# Patient Record
Sex: FEMALE | Race: WHITE | NOT HISPANIC OR LATINO | Employment: OTHER | URBAN - METROPOLITAN AREA
[De-identification: names, ages, dates, MRNs, and addresses within clinical notes are randomized per-mention and may not be internally consistent; named-entity substitution may affect disease eponyms.]

---

## 2019-02-11 ENCOUNTER — APPOINTMENT (OUTPATIENT)
Dept: GENERAL RADIOLOGY | Facility: HOSPITAL | Age: 84
End: 2019-02-11

## 2019-02-11 ENCOUNTER — HOSPITAL ENCOUNTER (EMERGENCY)
Facility: HOSPITAL | Age: 84
Discharge: HOME | End: 2019-02-11
Attending: EMERGENCY MEDICINE | Admitting: EMERGENCY MEDICINE

## 2019-02-11 VITALS
DIASTOLIC BLOOD PRESSURE: 91 MMHG | TEMPERATURE: 98 F | HEIGHT: 61 IN | OXYGEN SATURATION: 96 % | BODY MASS INDEX: 24.55 KG/M2 | HEART RATE: 76 BPM | WEIGHT: 130 LBS | RESPIRATION RATE: 18 BRPM | SYSTOLIC BLOOD PRESSURE: 155 MMHG

## 2019-02-11 DIAGNOSIS — S30.0XXA CONTUSION OF SACRUM, INITIAL ENCOUNTER: Primary | ICD-10-CM

## 2019-02-11 PROCEDURE — 99284 EMERGENCY DEPT VISIT MOD MDM: CPT

## 2019-02-11 PROCEDURE — 72110 X-RAY EXAM L-2 SPINE 4/>VWS: CPT

## 2019-02-11 NOTE — ED PROVIDER NOTES
EMERGENCY DEPARTMENT ENCOUNTER    CHIEF COMPLAINT  Chief Complaint: Fall, tailbone pain  History given by: Pt  History limited by: None  Room Number: 40/40  PMD: Provider, No Known      HPI:  Pt is a 85 y.o. female who presents complaining of tailbone pain s/p mechanical fall that occurred at home yesterday. Pt states her legs became weak and she fell backwards hitting her head. After falling she was not able to get up for 2hrs. Pt ambulates with a walker but was not using it at the time she fell. Pt is not anticoagulated.     Duration:  2 days  Onset: gradual  Timing: constant  Location: tailbone   Quality: fall backwards  Intensity/Severity: moderate  Progression: ongoing tailbone pain  Associated Symptoms: leg weakness  Previous Episodes: none    PAST MEDICAL HISTORY  Active Ambulatory Problems     Diagnosis Date Noted   • No Active Ambulatory Problems     Resolved Ambulatory Problems     Diagnosis Date Noted   • No Resolved Ambulatory Problems     No Additional Past Medical History       PAST SURGICAL HISTORY  History reviewed. No pertinent surgical history.    FAMILY HISTORY  History reviewed. No pertinent family history.    SOCIAL HISTORY  Social History     Socioeconomic History   • Marital status:      Spouse name: Not on file   • Number of children: Not on file   • Years of education: Not on file   • Highest education level: Not on file   Social Needs   • Financial resource strain: Not on file   • Food insecurity - worry: Not on file   • Food insecurity - inability: Not on file   • Transportation needs - medical: Not on file   • Transportation needs - non-medical: Not on file   Occupational History   • Not on file   Tobacco Use   • Smoking status: Never Smoker   Substance and Sexual Activity   • Alcohol use: No     Frequency: Never   • Drug use: Not on file   • Sexual activity: Not on file   Other Topics Concern   • Not on file   Social History Narrative   • Not on file        ALLERGIES  Penicillins    REVIEW OF SYSTEMS  Review of Systems   Constitutional: Negative for fever.   HENT: Negative for sore throat.    Eyes: Negative.    Respiratory: Negative for cough and shortness of breath.    Cardiovascular: Negative for chest pain.   Gastrointestinal: Negative for abdominal pain, diarrhea and vomiting.   Genitourinary: Negative for dysuria.   Musculoskeletal: Positive for arthralgias (tailbone pain). Negative for neck pain.   Skin: Negative for rash.   Neurological: Positive for weakness (bilateral legs). Negative for numbness and headaches.   Hematological: Negative.    Psychiatric/Behavioral: Negative.    All other systems reviewed and are negative.      PHYSICAL EXAM  ED Triage Vitals   Temp Heart Rate Resp BP SpO2   02/11/19 1317 02/11/19 1317 02/11/19 1317 02/11/19 1317 02/11/19 1317   99.6 °F (37.6 °C) 84 14 (!) 186/90 100 %      Temp src Heart Rate Source Patient Position BP Location FiO2 (%)   02/11/19 1317 02/11/19 1420 -- -- --   Tympanic Monitor          Physical Exam   Constitutional: She is oriented to person, place, and time. No distress.   HENT:   Head: Normocephalic and atraumatic.   Eyes: EOM are normal. Pupils are equal, round, and reactive to light.   Neck: Normal range of motion. Neck supple.   Cardiovascular: Normal rate, regular rhythm and normal heart sounds.   Pulmonary/Chest: Effort normal and breath sounds normal. No respiratory distress.   Abdominal: Soft. There is no tenderness. There is no rebound and no guarding.   Musculoskeletal: Normal range of motion. She exhibits no edema.   Tenderness on sacrum but not bruising present   Neurological: She is alert and oriented to person, place, and time. She has normal sensation and normal strength.   Skin: Skin is warm and dry. No rash noted.   Psychiatric: Mood and affect normal.   Nursing note and vitals reviewed.      RADIOLOGY  XR Spine Lumbar 4+ View   Final Result    Mild (about 15%) age-indeterminate  anterior wedging/superior endplate depression of L1, L3, correlate with location of pain; if further  imaging evaluation is indicated, MRI could be considered. Vertebral body heights are otherwise preserved. No other evidence of fracture is  identified. Small endplate spurring, lower lumbar facet arthropathy.  Mild anterolisthesis of L5 on S1. Mild disc space narrowing at L4/5.  Arterial calcification is present.   As described.       This report was finalized on 2/11/2019 3:09 PM by Dr. Jamshid Atkinson M.D.               I ordered the above noted radiological studies. Interpreted by radiologist. Reviewed by me in PACS.       PROCEDURES  Procedures      PROGRESS AND CONSULTS   1:34 PM  XR lumbar spine ordered for evaluation.     3:56 PM  Informed pt of negative XR. Discussed plan to discharge and advised pt use walker when ambulating. Pt understands and agrees with the plan, all questions answered. Repeat temp was 98.3    MEDICAL DECISION MAKING  Results were reviewed/discussed with the patient and they were also made aware of online access. Pt also made aware that some labs, such as cultures, will not be resulted during ER visit and follow up with PMD is necessary.     MDM  Number of Diagnoses or Management Options     Amount and/or Complexity of Data Reviewed  Tests in the radiology section of CPT®: reviewed and ordered (XR lumbar spine-age indeterminate anterior wedging/superior endplate depression of L1 and L3)  Decide to obtain previous medical records or to obtain history from someone other than the patient: yes  Review and summarize past medical records: yes           DIAGNOSIS  Final diagnoses:   Contusion of sacrum, initial encounter       DISPOSITION  DISCHARGE    Patient discharged in stable condition.    Reviewed implications of results, diagnosis, meds, responsibility to follow up, warning signs and symptoms of possible worsening, potential complications and reasons to return to  ER.    Patient/Family voiced understanding of above instructions.    Discussed plan for discharge, as there is no emergent indication for admission. Patient referred to primary care provider for BP management due to today's BP. Pt/family is agreeable and understands need for follow up and repeat testing.  Pt is aware that discharge does not mean that nothing is wrong but it indicates no emergency is present that requires admission and they must continue care with follow-up as given below or physician of their choice.     FOLLOW-UP  No follow-up provider specified.       Medication List      No changes were made to your prescriptions during this visit.         Latest Documented Vital Signs:  As of 3:59 PM  BP- (!) 171/103 HR- 82 Temp- 99.6 °F (37.6 °C) (Tympanic) O2 sat- 97%    --  Documentation assistance provided by maged Garcia for Dr. Salgado.  Information recorded by the scrgenesise was done at my direction and has been verified and validated by me.     Padmini Garcia  02/11/19 1600       Fernando Salgado MD  02/11/19 2051

## 2019-02-11 NOTE — ED NOTES
Assisted patient with her calling her daughters and calling her friend (Wilian) who is picking her up. Patient is placed in the waiting room in a facility wheelchair to wait for her ride. Assisted patient with getting dressed and into a facility wheelchair. Patient is alert and oriented x4. Informed ER triage desk the patient may need assistance getting into the car when he arrives.      Temi Pineda RN  02/11/19 2356

## 2019-02-11 NOTE — ED NOTES
Patient in room 40.   Patient reports a fall last night and was unable to get up, patient reports her legs getting weak and fell backwards hitting her head. Patient c/o tailbone pain due to sitting on the floor for a prolonged period due to not being able to stand up. Patient was brought by EMS. Patient denies any head pain, denies LOC, is not on any blood thinners.   Patient was able to change positions from the wheelchair to the bed with minimal assistance.  Patient denies CP, SOA, or any other s/s at this time. Patient in NAD at this time, VSS, call light within reach, patient alert.      Temi Pineda, RN  02/11/19 9506

## 2019-02-11 NOTE — ED PROVIDER NOTES
MD ATTESTATION NOTE    The SHARON and I have discussed this patient's history, physical exam, and treatment plan.  I have reviewed the documentation and personally had a face to face interaction with the patient. I affirm the documentation and agree with the treatment and plan.  The attached note describes my personal findings.    History-Pt present c/o mechanical fall that occurred yesterday. Pt tripped and fell backwards hitting her head. She c/o mild HA. She denies LOC, weakness, and numbness. Pt is not on blood thinner.     PEx-Awake and A&Ox3, pt has bruising and ecchymosis diffusely to face, there is a hematoma to the R forehead, cochlear implant in place    Plan-CT head is negative. Pt will be discharged. Pt understands and agrees with the plan, all questions answered.      Documentation assistance provided by maged Garcia for Dr. Salgado.  Information recorded by the maged was done at my direction and has been verified and validated by me.       Padmini Garcia  02/11/19 9396

## 2019-02-12 ENCOUNTER — HOSPITAL ENCOUNTER (OUTPATIENT)
Facility: HOSPITAL | Age: 84
Setting detail: OBSERVATION
Discharge: NURSING, REHAB OR LONG-TERM CARE | End: 2019-02-14
Attending: EMERGENCY MEDICINE | Admitting: EMERGENCY MEDICINE

## 2019-02-12 ENCOUNTER — APPOINTMENT (OUTPATIENT)
Dept: CT IMAGING | Facility: HOSPITAL | Age: 84
End: 2019-02-12

## 2019-02-12 DIAGNOSIS — R53.1 WEAKNESS: Primary | ICD-10-CM

## 2019-02-12 DIAGNOSIS — Z74.09 IMMOBILITY: ICD-10-CM

## 2019-02-12 LAB
ANION GAP SERPL CALCULATED.3IONS-SCNC: 13.4 MMOL/L
BACTERIA UR QL AUTO: NORMAL /HPF
BASOPHILS # BLD AUTO: 0.03 10*3/MM3 (ref 0–0.2)
BASOPHILS NFR BLD AUTO: 0.6 % (ref 0–1.5)
BILIRUB UR QL STRIP: NEGATIVE
BUN BLD-MCNC: 17 MG/DL (ref 8–23)
BUN/CREAT SERPL: 24.6 (ref 7–25)
CALCIUM SPEC-SCNC: 8.9 MG/DL (ref 8.6–10.5)
CHLORIDE SERPL-SCNC: 101 MMOL/L (ref 98–107)
CLARITY UR: CLEAR
CO2 SERPL-SCNC: 26.6 MMOL/L (ref 22–29)
COLOR UR: YELLOW
CREAT BLD-MCNC: 0.69 MG/DL (ref 0.57–1)
DEPRECATED RDW RBC AUTO: 48.8 FL (ref 37–54)
EOSINOPHIL # BLD AUTO: 0.07 10*3/MM3 (ref 0–0.4)
EOSINOPHIL NFR BLD AUTO: 1.4 % (ref 0.3–6.2)
ERYTHROCYTE [DISTWIDTH] IN BLOOD BY AUTOMATED COUNT: 14.2 % (ref 12.3–15.4)
GFR SERPL CREATININE-BSD FRML MDRD: 81 ML/MIN/1.73
GLUCOSE BLD-MCNC: 92 MG/DL (ref 65–99)
GLUCOSE UR STRIP-MCNC: NEGATIVE MG/DL
HCT VFR BLD AUTO: 39.3 % (ref 34–46.6)
HGB BLD-MCNC: 12.6 G/DL (ref 12–15.9)
HGB UR QL STRIP.AUTO: NEGATIVE
HYALINE CASTS UR QL AUTO: NORMAL /LPF
IMM GRANULOCYTES # BLD AUTO: 0.02 10*3/MM3 (ref 0–0.05)
IMM GRANULOCYTES NFR BLD AUTO: 0.4 % (ref 0–0.5)
KETONES UR QL STRIP: NEGATIVE
LEUKOCYTE ESTERASE UR QL STRIP.AUTO: ABNORMAL
LYMPHOCYTES # BLD AUTO: 1.19 10*3/MM3 (ref 0.7–3.1)
LYMPHOCYTES NFR BLD AUTO: 23.8 % (ref 19.6–45.3)
MCH RBC QN AUTO: 30.1 PG (ref 26.6–33)
MCHC RBC AUTO-ENTMCNC: 32.1 G/DL (ref 31.5–35.7)
MCV RBC AUTO: 93.8 FL (ref 79–97)
MONOCYTES # BLD AUTO: 0.57 10*3/MM3 (ref 0.1–0.9)
MONOCYTES NFR BLD AUTO: 11.4 % (ref 5–12)
NEUTROPHILS # BLD AUTO: 3.11 10*3/MM3 (ref 1.4–7)
NEUTROPHILS NFR BLD AUTO: 62.4 % (ref 42.7–76)
NITRITE UR QL STRIP: NEGATIVE
NRBC BLD AUTO-RTO: 0 /100 WBC (ref 0–0)
PH UR STRIP.AUTO: 5.5 [PH] (ref 5–8)
PLATELET # BLD AUTO: 183 10*3/MM3 (ref 140–450)
PMV BLD AUTO: 9.4 FL (ref 6–12)
POTASSIUM BLD-SCNC: 3.3 MMOL/L (ref 3.5–5.2)
PROT UR QL STRIP: NEGATIVE
RBC # BLD AUTO: 4.19 10*6/MM3 (ref 3.77–5.28)
RBC # UR: NORMAL /HPF
REF LAB TEST METHOD: NORMAL
SODIUM BLD-SCNC: 141 MMOL/L (ref 136–145)
SP GR UR STRIP: 1.01 (ref 1–1.03)
SQUAMOUS #/AREA URNS HPF: NORMAL /HPF
UROBILINOGEN UR QL STRIP: ABNORMAL
WBC NRBC COR # BLD: 4.99 10*3/MM3 (ref 3.4–10.8)
WBC UR QL AUTO: NORMAL /HPF

## 2019-02-12 PROCEDURE — G0378 HOSPITAL OBSERVATION PER HR: HCPCS

## 2019-02-12 PROCEDURE — 80048 BASIC METABOLIC PNL TOTAL CA: CPT | Performed by: EMERGENCY MEDICINE

## 2019-02-12 PROCEDURE — 70450 CT HEAD/BRAIN W/O DYE: CPT

## 2019-02-12 PROCEDURE — 85025 COMPLETE CBC W/AUTO DIFF WBC: CPT | Performed by: EMERGENCY MEDICINE

## 2019-02-12 PROCEDURE — 25810000003 SODIUM CHLORIDE 0.9 % WITH KCL 20 MEQ 20-0.9 MEQ/L-% SOLUTION: Performed by: HOSPITALIST

## 2019-02-12 PROCEDURE — 96360 HYDRATION IV INFUSION INIT: CPT

## 2019-02-12 PROCEDURE — 97162 PT EVAL MOD COMPLEX 30 MIN: CPT | Performed by: PHYSICAL THERAPIST

## 2019-02-12 PROCEDURE — 81001 URINALYSIS AUTO W/SCOPE: CPT | Performed by: EMERGENCY MEDICINE

## 2019-02-12 PROCEDURE — 99283 EMERGENCY DEPT VISIT LOW MDM: CPT

## 2019-02-12 RX ORDER — PANTOPRAZOLE SODIUM 40 MG/1
40 TABLET, DELAYED RELEASE ORAL
Status: DISCONTINUED | OUTPATIENT
Start: 2019-02-13 | End: 2019-02-14 | Stop reason: HOSPADM

## 2019-02-12 RX ORDER — SODIUM CHLORIDE AND POTASSIUM CHLORIDE 150; 900 MG/100ML; MG/100ML
75 INJECTION, SOLUTION INTRAVENOUS CONTINUOUS
Status: DISCONTINUED | OUTPATIENT
Start: 2019-02-12 | End: 2019-02-14

## 2019-02-12 RX ORDER — ASPIRIN 81 MG/1
81 TABLET, CHEWABLE ORAL DAILY
Status: DISCONTINUED | OUTPATIENT
Start: 2019-02-13 | End: 2019-02-14 | Stop reason: HOSPADM

## 2019-02-12 RX ADMIN — POTASSIUM CHLORIDE AND SODIUM CHLORIDE 75 ML/HR: 900; 150 INJECTION, SOLUTION INTRAVENOUS at 23:18

## 2019-02-12 NOTE — THERAPY EVALUATION
Acute Care - Physical Therapy Initial Evaluation  [facility]     Patient Name: Wendi Fofana  : 1933  MRN: [id number]  Today's Date: 2019                Admit Date: 2019    Visit Dx: No diagnosis found.  There is no problem list on file for this patient.    History reviewed. No pertinent past medical history.  History reviewed. No pertinent surgical history.     PT ASSESSMENT (last 12 hours)      Physical Therapy Evaluation     Row Name 19 1644          PT Evaluation Time/Intention    Subjective Information  complains of;weakness;fatigue  -     Document Type  evaluation  -     Mode of Treatment  physical therapy  -     Patient Effort  adequate  -     Symptoms Noted During/After Treatment  fatigue  -     Row Name 19 1644          General Information    Patient Observations  alert;cooperative;agree to therapy  -     General Observations of Patient  in bed  -     Prior Level of Function  independent:  -     Equipment Currently Used at Home  cane, straight;walker, rolling  -     Pertinent History of Current Functional Problem  pt presented to ER with weakness, inability to care for self, recent falls  -     Existing Precautions/Restrictions  fall  -     Row Name 19 1644          Relationship/Environment    Lives With  alone  -     Row Name 19 1644          Resource/Environmental Concerns    Current Living Arrangements  home/apartment/condo  -     Row Name 19 1644          Cognitive Assessment/Interventions    Additional Documentation  Cognitive Assessment/Intervention (Group)  -     Row Name 19 1644          Cognitive Assessment/Intervention- PT/OT    Orientation Status (Cognition)  oriented x 3  -     Follows Commands (Cognition)  WNL  -     Personal Safety Interventions  gait belt;nonskid shoes/slippers when out of bed  -     Row Name 19 1644          Bed Mobility Assessment/Treatment    Bed Mobility Assessment/Treatment   supine-sit;sit-supine  -     Supine-Sit [place] (Bed Mobility)  minimum assist (75% patient effort)  -     Sit-Supine [place] (Bed Mobility)  minimum assist (75% patient effort)  -     Assistive Device (Bed Mobility)  bed rails;head of bed elevated  -[facility] Name 02/12/19 1644          Transfer Assessment/Treatment    Transfer Assessment/Treatment  sit-stand transfer;stand-sit transfer  -     Sit-Stand [place] (Transfers)  minimum assist (75% patient effort)  -     Stand-Sit [place] (Transfers)  minimum assist (75% patient effort)  -     Row Name 02/12/19 1644          Sit-Stand Transfer    Assistive Device (Sit-Stand Transfers)  walker, front-wheeled  -     Row Name 02/12/19 1644          Stand-Sit Transfer    Assistive Device (Stand-Sit Transfers)  walker, front-wheeled  -[facility] Name 02/12/19 1644          Gait/Stairs Assessment/Training    [place] Level (Gait)  minimum assist (75% patient effort);moderate assist (50% patient effort)  -     Assistive Device (Gait)  walker, front-wheeled  -     Distance in Feet (Gait)  3 sidesteps to HOB, 1 step forward and backward  -     Deviations/Abnormal Patterns (Gait)  stride length decreased;base of support, narrow  -     Bilateral Gait Deviations  forward flexed posture;heel strike decreased  -     Comment (Gait/Stairs)  fatigued quickly with c/o that knees feel like they will give out with forward step  -[facility] Name 02/12/19 1644          General ROM    GENERAL ROM COMMENTS  WFL  -[facility] Name 02/12/19 1644          MMT (Manual Muscle Testing)    General MMT Comments  functionally 3-/5  -[facility] Name 02/12/19 1644          Motor Assessment/Intervention    Additional Documentation  Therapeutic Exercise Interventions (Group)  -[facility] Name 02/12/19 1644          Therapeutic Exercise    Comment (Therapeutic Exercise)  BLE AP, LAQ, seated marhcing x 5 reps  -[facility] Name 02/12/19 1644          Pain  Assessment    Additional Documentation  Pain Scale: Numbers Pre/Post-Treatment (Group)  -     Row Name 02/12/19 1644          Pain Scale: Numbers Pre/Post-Treatment    Pain Scale: Numbers, Pretreatment  1/10  -     Pain Location  -- tailbone-from being down on the floor  -     Pain Intervention(s)  Repositioned  -     Row Name 02/12/19 1644          Physical Therapy Clinical Impression    Patient/Family Goals Statement (PT Clinical Impression)  return to OF  -     Criteria for Skilled Interventions Met (PT Clinical Impression)  treatment indicated  -     Impairments Found (describe specific impairments)  gait, locomotion, and balance;aerobic capacity/endurance  -     Rehab Potential (PT Clinical Summary)  good, to achieve stated therapy goals  -     Row Name 02/12/19 1644          Physical Therapy Goals    Bed Mobility Goal Selection (PT)  bed mobility, PT goal 1  -     Transfer Goal Selection (PT)  transfer, PT goal 1  -     Gait Training Goal Selection (PT)  gait training, PT goal 1  -[facility] Name 02/12/19 1644          Bed Mobility Goal 1 (PT)    Activity/Assistive Device (Bed Mobility Goal 1, PT)  bed mobility activities, all  -     [place] Level/Cues Needed (Bed Mobility Goal 1, PT)  supervision required  -     Time Frame (Bed Mobility Goal 1, PT)  1 week  -[facility] Name 02/12/19 1644          Transfer Goal 1 (PT)    Activity/Assistive Device (Transfer Goal 1, PT)  transfers, all;walker, rolling  -KH     [place] Level/Cues Needed (Transfer Goal 1, PT)  contact guard assist  -KH     Time Frame (Transfer Goal 1, PT)  1 week  -[facility] Name 02/12/19 1644          Gait Training Goal 1 (PT)    Activity/Assistive Device (Gait Training Goal 1, PT)  gait (walking locomotion);walker, rolling  -KH     [place] Level (Gait Training Goal 1, PT)  contact guard assist  -KH     Distance (Gait Goal 1, PT)  75ft  -KH     Time Frame (Gait Training Goal 1, PT)  1 week  -[facility]  Name 02/12/19 1644          Patient Education Goal (PT)    Activity (Patient Education Goal, PT)  HEP  -     [place]/Cues/Accuracy (Memory Goal 2, PT)  demonstrates adequately  -     Time Frame (Patient Education Goal, PT)  1 week  -     Row Name 02/12/19 1644          Positioning and Restraints    Pre-Treatment Position  in bed  -     Post Treatment Position  bed  -KH     In Bed  fowlers;call light within reach;encouraged to call for assist;with family/caregiver;notified nsg  -       User Key  (r) = Recorded By, (t) = Taken By, (c) = Cosigned By    Initials Name Provider Type    Marianna Segal, PT Physical Therapist        Physical Therapy Education     Title: PT OT SLP Therapies (Done)     Topic: Physical Therapy (Done)     Point: Mobility training (Done)     Learning Progress Summary           Patient Acceptance, D,E, VU,NR by  at 2/12/2019  4:53 PM                   Point: Home exercise program (Done)     Learning Progress Summary           Patient Acceptance, D,E, VU,NR by  at 2/12/2019  4:53 PM                   Point: Body mechanics (Done)     Learning Progress Summary           Patient Acceptance, D,E, VU,NR by  at 2/12/2019  4:53 PM                   Point: Precautions (Done)     Learning Progress Summary           Patient Acceptance, D,E, VU,NR by  at 2/12/2019  4:53 PM                               User Key     Initials Effective Dates Name Provider Type [facility] 02/05/19 -  Marianna Alcala, PT Physical Therapist PT              PT Recommendation and Plan  Anticipated Discharge Disposition (PT): skilled nursing facility  Planned Therapy Interventions (PT Eval): balance training, bed mobility training, gait training, home exercise program, patient/family education, strengthening, transfer training  Therapy Frequency (PT Clinical Impression): daily  Outcome Summary/Treatment Plan (PT)  Anticipated Discharge Disposition (PT): skilled nursing  facility  Outcome Summary: Pt presents from home with weakness, inability to walk or care for self and recent falls. Pt presents with generalized weakness and deconditioning, impaired balance and difficulty walking. Pt would benefit from PT to address these impairments and will need SNF placement as she is unsafe to return home alone.   Outcome Measures     Row Name 02/12/19 1600             How much help from another person do you currently need...    Turning from your back to your side while in flat bed without using bedrails?  3  -KH      Moving from lying on back to sitting on the side of a flat bed without bedrails?  3  -KH      Moving to and from a bed to a chair (including a wheelchair)?  2  -KH      Standing up from a chair using your arms (e.g., wheelchair, bedside chair)?  3  -KH      Climbing 3-5 steps with a railing?  1  -KH      To walk in hospital room?  2  -KH      AM-PAC 6 Clicks Score  14  -KH         Functional Assessment    Outcome Measure Options  AM-PAC 6 Clicks Basic Mobility (PT)  -        User Key  (r) = Recorded By, (t) = Taken By, (c) = Cosigned By    Initials Name Provider Type    Marianna Segal PT Physical Therapist         Time Calculation:   PT Charges     Row Name 02/12/19 1658             Time Calculation    Start Time  1620  -      Stop Time  1640  -      Time Calculation (min)  20 min  -KH      PT Received On  02/12/19  -      PT - Next Appointment  02/13/19  -      PT Goal Re-Cert Due Date  02/19/19  -        User Key  (r) = Recorded By, (t) = Taken By, (c) = Cosigned By    Initials Name Provider Type    Marianna Segal PT Physical Therapist        Therapy Suggested Charges     Code   Minutes Charges    None           Therapy Charges for Today     Code Description Service Date Service Provider Modifiers Qty    20382248754 HC PT EVAL MOD COMPLEXITY 2 2/12/2019 Marianna Alcala, PT GP 1          PT G-Codes  Outcome Measure Options: AM-PAC 6  Clicks Basic Mobility (PT)  AM-PAC 6 Clicks Score: 14      Marianna Alcala, PT  2/12/2019

## 2019-02-12 NOTE — PROGRESS NOTES
Called to waiting room by ER triage staff. Pt and friend, Wilian, requesting information on PCP, Home Health, and sitters. [facility] [place] given to pt with instructions on how to use the referral line. Informed pt and friend that pt needs PCP before home health could be ordered. [facility] book and private duty sitter list given to pt and friend. All questions answered. No other concerns voiced at this time. Laura Moreno RN

## 2019-02-12 NOTE — PROGRESS NOTES
Spoke w/patient and family friend Elina regarding ability for patient to go home w/assistance pending medical clearance awaiting precert for rehab.  Advised that there is nobody that can stay with patient at this time and unable to afford sitter. Notified JOLENE and Laura Moreno who will be taking over case. Per Macy w/Sergio- upon completion of PT notes to please fax over to [phone]. Katherine Trujillo RN

## 2019-02-12 NOTE — ED PROVIDER NOTES
EMERGENCY DEPARTMENT ENCOUNTER    CHIEF COMPLAINT  Chief Complaint: Frequent falls  History given by: Pt, friend  History limited by: None  Room Number: 38/38  PMD: Volodymyr Cornejo Jr., MD      HPI:  Pt is a 85 y.o. female who presents complaining of frequent falls due to generalized weakness. She states she has been falling more frequently since Oct-Nov 2018. Pt c/o diplopia and left eye droop since falling and hitting the back of her head in Oct or Nov. Pt denies unilateral weakness. Pt was seen in ED yesterday for a fall that occurred 2 days ago and had a negative workup. Pt is in the ED today hoping to get placement into a rehab or nursing home facility.     Duration:  Several months  Onset: gradual  Timing: constant  Location: generalized  Quality: weakness  Intensity/Severity: moderate  Progression: worsening  Associated Symptoms: generalized weakness, diplopia, left eye droop  Aggravating Factors: weakness  Previous Episodes: frequent falls    PAST MEDICAL HISTORY  Active Ambulatory Problems     Diagnosis Date Noted   • No Active Ambulatory Problems     Resolved Ambulatory Problems     Diagnosis Date Noted   • No Resolved Ambulatory Problems     No Additional Past Medical History       PAST SURGICAL HISTORY  History reviewed. No pertinent surgical history.    FAMILY HISTORY  History reviewed. No pertinent family history.    SOCIAL HISTORY  Social History     Socioeconomic History   • Marital status:      Spouse name: Not on file   • Number of children: Not on file   • Years of education: Not on file   • Highest education level: Not on file   Social Needs   • Financial resource strain: Not on file   • Food insecurity - worry: Not on file   • Food insecurity - inability: Not on file   • Transportation needs - medical: Not on file   • Transportation needs - non-medical: Not on file   Occupational History   • Not on file   Tobacco Use   • Smoking status: Never Smoker   Substance and Sexual Activity   •  Alcohol use: No     Frequency: Never   • Drug use: Not on file   • Sexual activity: Not on file   Other Topics Concern   • Not on file   Social History Narrative   • Not on file       ALLERGIES  Penicillins    REVIEW OF SYSTEMS  Review of Systems   Constitutional: Negative for fever.        Frequent falls   HENT: Negative for sore throat.    Eyes: Positive for visual disturbance (diplopia).        Left eye droop   Respiratory: Negative for cough and shortness of breath.    Cardiovascular: Negative for chest pain.   Gastrointestinal: Negative for abdominal pain, diarrhea and vomiting.   Genitourinary: Negative for dysuria.   Musculoskeletal: Negative for neck pain.   Skin: Negative for rash.   Neurological: Positive for weakness (generalized). Negative for numbness and headaches.   Hematological: Negative.    Psychiatric/Behavioral: Negative.    All other systems reviewed and are negative.      PHYSICAL EXAM  ED Triage Vitals [02/12/19 1305]   Temp Heart Rate Resp BP SpO2   99.4 °F (37.4 °C) 79 18 -- 96 %      Temp src Heart Rate Source Patient Position BP Location FiO2 (%)   Tympanic Monitor -- -- --       Physical Exam   Constitutional: She is oriented to person, place, and time and well-developed, well-nourished, and in no distress. No distress.   HENT:   Mouth/Throat: Mucous membranes are normal.   Eyes: EOM are normal.   Cardiovascular: Normal rate and regular rhythm. Exam reveals no gallop and no friction rub.   No murmur heard.  Pulmonary/Chest: Effort normal. No respiratory distress. She has no wheezes. She has no rhonchi. She has no rales.   Breath sounds are symmetric.   Abdominal: Soft. She exhibits no distension. There is no tenderness. There is no guarding.   Musculoskeletal: Normal range of motion. She exhibits no deformity.   Neurological: She is alert and oriented to person, place, and time.   moving all extremities, no focal deficits  Left upper eyelid droop, eye positioning is normal and she has  normal EOM   Skin: Skin is warm and dry.   Psychiatric:   Calm, cooperative     LAB RESULTS  Lab Results (last 24 hours)     ** No results found for the last 24 hours. **        I ordered the above labs and reviewed the results    RADIOLOGY  CT Head Without Contrast   Final Result           No acute intracranial hemorrhage or hydrocephalus. Chronic-appearing   changes of the brain. If there is further clinical concern, MRI could be   considered for further evaluation.       This report was finalized on 2/12/2019 5:29 PM by Dr. Jamshid Atkinson M.D.            I ordered the above noted radiological studies. Interpreted by radiologist.  Reviewed by me in PACS.     PROCEDURES  Procedures      PROGRESS AND CONSULTS   3:25 PM  PT consult and eval ordered.     4:22 PM  CT head ordered for evaluation.     5:44 PM  Spoke with JANIE Sanchez. She states pt will need admission to get pre-cert for placement in a rehab facility. [facility] Labs ordered.     6:41 PM  Discussed pt case with ZOEY Valiente, who agrees to admit.     MEDICAL DECISION MAKING  Results were reviewed/discussed with the patient and they were also made aware of online access. Pt also made aware that some labs, such as cultures, will not be resulted during ER visit and follow up with PMD is necessary.     MDM  Number of Diagnoses or Management Options  Immobility:   Weakness:   Diagnosis management comments: Patient with severe weakness developing to the point where she cannot care for herself at home, over the last few months. No family or help available to her. CCP consulted and are in progress on rehab placement, but she does need observation stay until that can be completed. Patient hospitalized with Dr. Orr.        Amount and/or Complexity of Data Reviewed  Clinical lab tests: ordered and reviewed  Tests in the radiology section of CPT®: ordered and reviewed (CT head-negative acute)  Decide to obtain previous medical  records or to obtain history from someone other than the patient: yes  Review and summarize past medical records: yes  Discuss the patient with other providers: yes (Laura, ZOEY [address]           DIAGNOSIS  Final diagnoses:   Weakness   Immobility       DISPOSITION  ADMISSION    Discussed treatment plan and reason for admission with pt/family and admitting physician.  Pt/family voiced understanding of the plan for admission for further testing/treatment as needed.       Latest Documented Vital Signs:  As of 6:43 PM  BP- 136/84 HR- 79 Temp- 99.4 °F (37.4 °C) (Tympanic) O2 sat- 96%    --  Documentation assistance provided by maged Garcia for Dr. Escobedo.  Information recorded by the scribe was done at my direction and has been verified and validated by me.           Padmini Garcia  02/12/19 7323       Manuel Escobedo MD  02/12/19 8759

## 2019-02-12 NOTE — DISCHARGE PLACEMENT REQUEST
"Wood Belcher (85 y.o. Female)     Date of Birth Social Security Number Address Home Phone MRN    04/14/1933  [id number]   [facility] [address] [phone] [id number]    Holiness Marital Status          None        Admission Date Admission Type Admitting Provider Attending Provider Department, Room/Bed    2/12/19 Emergency  Manuel Escobedo MD [facility] Emergency Department, 38/38    Discharge Date Discharge Disposition Discharge Destination                       Attending Provider:  Manuel Escobedo MD    Allergies:  Penicillins    Isolation:  None   Infection:  None   Code Status:  Not on file    Ht:  154.9 cm (61\")   Wt:  59 kg (130 lb)    Admission Cmt:  None   Principal Problem:  None                Active Insurance as of 2/12/2019     Primary Coverage     Payor Plan Insurance Group Employer/Plan Group    HUMANA MEDICARE REPLACEMENT HUMANA MEDICARE REPL [id number]     Payor Plan Address Payor Plan Phone Number Payor Plan Fax Number Effective Dates    [address] [address] [phone]  1/1/2019 - None Entered    [facility] [address]       Subscriber Name Subscriber Birth Date Member ID       WOOD BELCHER 4/14/1933 [id number]                 Emergency Contacts      (Rel.) Home Phone Work Phone Mobile Phone    SANTANA DICKEY (Daughter) [phone] -- [phone]    JOS SMITH [phone] -- [phone]        Katherine Trujillo RN   "

## 2019-02-12 NOTE — PROGRESS NOTES
"Discharge Planning Assessment  [facility]     Patient Name: Wendi Fofana  MRN: [id number]  Today's Date: 2/12/2019    Admit Date: 2/12/2019    Discharge Needs Assessment     Row Name 02/12/19 1541       Living Environment    Lives With  alone    Unique Family Situation  Family overseas and in multiple states, but none in town. Family friends helping with recent increased needs.    Current Living Arrangements  home/apartment/condo    Duration at Residence  \"since 1965\"    Primary Care Provided by  self    Provides Primary Care For  no one, unable/limited ability to care for self    Caregiving Concerns  out-of-town family    Family Caregiver if Needed  other (see comments) pt's daughter (who lives in [place]) has recently been traveling between states to help care, but is unable to maintain full-time status at pt's home to assist in her ADLs    Family Caregiver Names  Wilian (family friend), Elina (family friend)- who have been helping to provide pt transportation    Quality of Family Relationships  helpful;supportive    Able to Return to Prior Arrangements  no    Living Arrangement Comments  pt reports \"I had to try to use the restroom in a trash can because I couldn't get myself to the bathroom.\" -Safety concerns.       Resource/Environmental Concerns    Resource/Environmental Concerns  home accessibility    Home Accessibility Concerns  stairs to enter home;bathroom not accessible    Transportation Concerns  rides, unreliable from others       Transition Planning    Patient/Family Anticipates Transition to  inpatient rehabilitation facility    Patient/Family Anticipated Services at Transition  rehabilitation services    Transportation Anticipated  family or friend will provide       Discharge Needs Assessment    Readmission Within the Last 30 Days  no previous admission in last 30 days    Concerns to be Addressed  physical/sexual safety;basic needs;discharge planning    Equipment Currently Used at Home  walker, " "standard;cane, straight    Anticipated Changes Related to Illness  inability to care for self    Outpatient/Agency/Support Group Needs  inpatient rehabilitation facility    Discharge Facility/Level of Care Needs  rehabilitation facility    Offered/Gave Vendor List  other (see comments) Laura, Case Management provided yesterday    Patient's Choice of Community Agency(s)  [facility]    Current Discharge Risk  physical impairment;lives alone;dependent with mobility/activities of daily living    Discharge Coordination/Progress  spoke with ER MD, Dr. Escobedo, regarding pt and family concerns. PT Eval ordered for potential placement. Advised Iva with PT. Iva states, \"someone will be down shortly.\"        Discharge Plan    No documentation.       Destination      Service Provider Request Status Selected Services Address Phone Number Fax Number    [facility] - [facility] N/A [address] [facility] [address] [phone]       Katherine Trujillo, RN 2/12/2019 1550    Spoke with [facility], per pt and family request. Pt will need precertification for placement. PT eval/approval pending.   Katherine Trujillo, JENSEN                  Durable Medical Equipment      No service coordination in this encounter.      Dialysis/Infusion      No service coordination in this encounter.      Home Medical Care      No service coordination in this encounter.      Community Resources      No service coordination in this encounter.          Demographic Summary     Row Name 02/12/19 0440       General Information    Admission Type  other (see comments) ER pt    Arrived From  home    Referral Source  family;other (see comments) Lidia De León notified staff of family concerns    Reason for Consult  discharge planning;care coordination/care conference    Preferred Language  English     Used During This Interaction  no    General Information Comments  entered room and introduced self to pt/explained role. " "Elina Riri present in room (family friend). Permission given by pt to discuss situation in front of family friend. Pt also requested her daughter (Anahi) be consulted via telephone, since she lives in [place]. Family friend call daughter and put her on speaker for evaluation and discussion about rehab placement.       Contact Information    Permission Granted to Share Info With  ;family/designee;other (see comments) \"family friend\"    Contact Information Obtained for  ;facility  \"family friend\"       Facility  Information    Name, Facility   [facility]    Phone, Facility   [phone]        Functional Status     Row Name 02/12/19 0086       Functional Status    Usual Activity Tolerance  good    Current Activity Tolerance  poor    Functional Status Comments  S/P multiple falls- currently non-ambulatory and lvies at home alone.       Functional Status, IADL    Medications  independent previous to injuries    Meal Preparation  independent previous to injuries    Housekeeping  independent previous to injuries    Laundry  independent previous to injuries    Shopping  independent previous to injuries       Mental Status    General Appearance WDL  WDL       Mental Status Summary    Recent Changes in Mental Status/Cognitive Functioning  no changes        Psychosocial    No documentation.       Abuse/Neglect    No documentation.       Legal    No documentation.       Substance Abuse    No documentation.       Patient Forms    No documentation.           Katherine Trujillo RN    "

## 2019-02-12 NOTE — ED TRIAGE NOTES
Pt's friend states 6 weeks ago pt was walking at the grocery with a cane.  Can't walk well and c/o weakness after fall a couple days ago.  Many weeks ago pt fell and hit her head and eye has been drooping since - out of town daughter thinks she had a stroke.  They want her admitted so she can be placed in a rehab.  I advised we could give them resources

## 2019-02-12 NOTE — PLAN OF CARE
Problem: Patient Care Overview  Goal: Plan of Care Review   02/12/19 4565   OTHER   Outcome Summary Pt presents from home with weakness, inability to walk or care for self and recent falls. Pt presents with generalized weakness and deconditioning, impaired balance and difficulty walking. Pt would benefit from PT to address these impairments and will need SNF placement as she is unsafe to return home alone.

## 2019-02-13 ENCOUNTER — APPOINTMENT (OUTPATIENT)
Dept: MRI IMAGING | Facility: HOSPITAL | Age: 84
End: 2019-02-13

## 2019-02-13 ENCOUNTER — APPOINTMENT (OUTPATIENT)
Dept: GENERAL RADIOLOGY | Facility: HOSPITAL | Age: 84
End: 2019-02-13

## 2019-02-13 PROCEDURE — 97110 THERAPEUTIC EXERCISES: CPT

## 2019-02-13 PROCEDURE — G0378 HOSPITAL OBSERVATION PER HR: HCPCS

## 2019-02-13 PROCEDURE — 0 GADOBENATE DIMEGLUMINE 529 MG/ML SOLUTION: Performed by: HOSPITALIST

## 2019-02-13 PROCEDURE — 70553 MRI BRAIN STEM W/O & W/DYE: CPT

## 2019-02-13 PROCEDURE — A9577 INJ MULTIHANCE: HCPCS | Performed by: HOSPITALIST

## 2019-02-13 PROCEDURE — 97535 SELF CARE MNGMENT TRAINING: CPT

## 2019-02-13 PROCEDURE — 99203 OFFICE O/P NEW LOW 30 MIN: CPT | Performed by: PSYCHIATRY & NEUROLOGY

## 2019-02-13 PROCEDURE — 93010 ELECTROCARDIOGRAM REPORT: CPT | Performed by: INTERNAL MEDICINE

## 2019-02-13 PROCEDURE — 96361 HYDRATE IV INFUSION ADD-ON: CPT

## 2019-02-13 PROCEDURE — 71045 X-RAY EXAM CHEST 1 VIEW: CPT

## 2019-02-13 PROCEDURE — 25810000003 SODIUM CHLORIDE 0.9 % WITH KCL 20 MEQ 20-0.9 MEQ/L-% SOLUTION: Performed by: HOSPITALIST

## 2019-02-13 PROCEDURE — 93005 ELECTROCARDIOGRAM TRACING: CPT | Performed by: HOSPITALIST

## 2019-02-13 PROCEDURE — 97165 OT EVAL LOW COMPLEX 30 MIN: CPT

## 2019-02-13 RX ORDER — ARIPIPRAZOLE 5 MG/1
5 TABLET ORAL
Status: DISCONTINUED | OUTPATIENT
Start: 2019-02-13 | End: 2019-02-14 | Stop reason: HOSPADM

## 2019-02-13 RX ADMIN — ASPIRIN 81 MG: 81 TABLET, CHEWABLE ORAL at 09:26

## 2019-02-13 RX ADMIN — POTASSIUM CHLORIDE AND SODIUM CHLORIDE 75 ML/HR: 900; 150 INJECTION, SOLUTION INTRAVENOUS at 13:31

## 2019-02-13 RX ADMIN — GADOBENATE DIMEGLUMINE 9 ML: 529 INJECTION, SOLUTION INTRAVENOUS at 15:24

## 2019-02-13 RX ADMIN — ARIPIPRAZOLE 5 MG: 5 TABLET ORAL at 18:58

## 2019-02-13 NOTE — PLAN OF CARE
Problem: Patient Care Overview  Goal: Plan of Care Review  Outcome: Ongoing (interventions implemented as appropriate)   02/13/19 8970   OTHER   Outcome Summary Pt increasing with amb distance and bed mobility but still fatigues with amb and requires encouragement to continue to amb   Coping/Psychosocial   Plan of Care Reviewed With patient   Plan of Care Review   Progress improving   Coping/Psychosocial   Patient Agreement with Plan of Care agrees

## 2019-02-13 NOTE — PLAN OF CARE
Problem: Patient Care Overview  Goal: Plan of Care Review  Outcome: Ongoing (interventions implemented as appropriate)   02/13/19 0415   OTHER   Outcome Summary Pt admitted from ED due to hx of falls and worsening weakness. Live at home alone and uses a cane or walker normally to ambulate, due to weakness pt has not been able to ambulate recently. CT head in ED completed. Bruised area on coccyx. Alert and oriented x4, neuro checks q4hrs. Neuro consult called, PT/OT, and CCP consult placed. IVF with K+ infusing. Pt states children live in other states. Daughter (maicol called) and would like updates from CCP and MD, noted. Clean catch urine pending.    Coping/Psychosocial   Plan of Care Reviewed With patient   Plan of Care Review   Progress improving     Goal: Individualization and Mutuality  Outcome: Ongoing (interventions implemented as appropriate)    Goal: Discharge Needs Assessment  Outcome: Ongoing (interventions implemented as appropriate)    Goal: Interprofessional Rounds/Family Conf  Outcome: Ongoing (interventions implemented as appropriate)      Problem: Fall Risk (Adult)  Goal: Identify Related Risk Factors and Signs and Symptoms  Outcome: Ongoing (interventions implemented as appropriate)    Goal: Absence of Fall  Outcome: Ongoing (interventions implemented as appropriate)

## 2019-02-13 NOTE — H&P
History and physical    Primary care physician  Dr. GUNTER    Chief complaint  Frequent falls  Generalized weakness  Left eye drooping  Headache    History of present illness  85-year-old white female with no medical problems to medications who lives by herself presented to [facility] emergency room with frequent falls 3 times in 1 week.  Patient fully alert oriented also complaining of headache and left eye drooping which she noticed recently.  Patient denies any weakness of arms and legs and also denies any numbness tingling speech problem or difficulty swallowing.  Patient has no vision problem either.  Patient evaluated in ER admitted for management.    PAST MEDICAL HISTORY  Unremarkable     PAST SURGICAL HISTORY  Unremarkable    FAMILY HISTORY  History reviewed. No pertinent family history.     SOCIAL HISTORY  Social History               Socioeconomic History   • Marital status:        Spouse name: Not on file   • Number of children: Not on file   • Years of education: Not on file   • Highest education level: Not on file   Social Needs   • Financial resource strain: Not on file   • Food insecurity - worry: Not on file   • Food insecurity - inability: Not on file   • Transportation needs - medical: Not on file   • Transportation needs - non-medical: Not on file   Occupational History   • Not on file   Tobacco Use   • Smoking status: Never Smoker   Substance and Sexual Activity   • Alcohol use: No       Frequency: Never   • Drug use: Not on file   • Sexual activity: Not on file   Other Topics Concern   • Not on file   Social History Narrative   • Not on file         ALLERGIES  Penicillins  No home medications     REVIEW OF SYSTEMS  Review of Systems   Constitutional: Negative for fever.        Frequent falls   HENT: Negative for sore throat.    Eyes: Positive for visual disturbance (diplopia).        Left eye droop   Respiratory: Negative for cough and shortness of breath.    Cardiovascular:  "Negative for chest pain.   Gastrointestinal: Negative for abdominal pain, diarrhea and vomiting.   Genitourinary: Negative for dysuria.   Musculoskeletal: Negative for neck pain.   Skin: Negative for rash.   Neurological: Positive for weakness (generalized). Negative for numbness and headaches.   Hematological: Negative.    Psychiatric/Behavioral: Negative.    All other systems reviewed and are negative.     PHYSICAL EXAM  Blood pressure 116/72, pulse 59, temperature 99.3 °F (37.4 °C), temperature source Oral, resp. rate 16, height 154.9 cm (61\"), weight 49.6 kg (109 lb 5.6 oz), SpO2 96 %.    Constitutional: She is oriented to person, place, and time and well-developed, well-nourished, and in no distress. No distress.   Mouth/Throat: Mucous membranes are normal.   Eyes: EOM are normal.   Cardiovascular: Normal rate and regular rhythm. Exam reveals no gallop and no friction rub.   No murmur heard.  Pulmonary/Chest: Effort normal. No respiratory distress. She has no wheezes. She has no rhonchi. She has no rales. Breath sounds are symmetric.   Abdominal: Soft. She exhibits no distension. There is no tenderness. There is no guarding.   Musculoskeletal: Normal range of motion. She exhibits no deformity.   Neurological: She is alert and oriented to person, place, and time. moving all extremities, no focal deficits  Left upper eyelid droop, eye positioning is normal and she has normal EOM   Skin: Skin is warm and dry.   Psychiatric: Calm, cooperative      LAB RESULTS  Lab Results (last 24 hours)     Procedure Component Value Units Date/Time    Urinalysis With Microscopic If Indicated (No Culture) - Urine, Clean Catch [[id number]]  (Abnormal) Collected:  02/12/19 2343    Specimen:  Urine, Clean Catch Updated:  02/12/19 2358     Color, UA Yellow     Appearance, UA Clear     pH, UA 5.5     Specific Gravity, UA 1.011     Glucose, UA Negative     Ketones, UA Negative     Bilirubin, UA Negative     Blood, UA Negative     Protein, " UA Negative     Leuk Esterase, UA Trace     Nitrite, UA Negative     Urobilinogen, UA 0.2 E.U./dL    Urinalysis, Microscopic Only - Urine, Clean Catch [[id number]] Collected:  02/12/19 2343    Specimen:  Urine, Clean Catch Updated:  02/12/19 2358     RBC, UA 0-2 /HPF      WBC, UA 0-2 /HPF      Bacteria, UA None Seen /HPF      Squamous Epithelial Cells, UA 0-2 /HPF      Hyaline Casts, UA 0-2 /LPF      Methodology Automated Microscopy    Basic Metabolic Panel [[id number]]  (Abnormal) Collected:  02/12/19 1848    Specimen:  Blood Updated:  02/12/19 1924     Glucose 92 mg/dL      BUN 17 mg/dL      Creatinine 0.69 mg/dL      Sodium 141 mmol/L      Potassium 3.3 mmol/L      Chloride 101 mmol/L      CO2 26.6 mmol/L      Calcium 8.9 mg/dL      eGFR Non African Amer 81 mL/min/1.73      BUN/Creatinine Ratio 24.6     Anion Gap 13.4 mmol/L     Narrative:       The MDRD GFR formula is only valid for adults with stable renal function between ages 18 and 70.    CBC & Differential [[id number]] Collected:  02/12/19 1848    Specimen:  Blood Updated:  02/12/19 1902    Narrative:       The following orders were created for panel order CBC & Differential.  Procedure                               Abnormality         Status                     ---------                               -----------         ------                     CBC Auto Differential[[id number]]        Normal              Final result                 Please view results for these tests on the individual orders.    CBC Auto Differential [[id number]]  (Normal) Collected:  02/12/19 1848    Specimen:  Blood Updated:  02/12/19 1902     WBC 4.99 10*3/mm3      RBC 4.19 10*6/mm3      Hemoglobin 12.6 g/dL      Hematocrit 39.3 %      MCV 93.8 fL      MCH 30.1 pg      MCHC 32.1 g/dL      RDW 14.2 %      RDW-SD 48.8 fl      MPV 9.4 fL      Platelets 183 10*3/mm3      Neutrophil % 62.4 %      Lymphocyte % 23.8 %      Monocyte % 11.4 %      Eosinophil % 1.4 %      Basophil % 0.6 %       Immature Grans % 0.4 %      Neutrophils, Absolute 3.11 10*3/mm3      Lymphocytes, Absolute 1.19 10*3/mm3      Monocytes, Absolute 0.57 10*3/mm3      Eosinophils, Absolute 0.07 10*3/mm3      Basophils, Absolute 0.03 10*3/mm3      Immature Grans, Absolute 0.02 10*3/mm3      nRBC 0.0 /100 WBC         Imaging Results (last 24 hours)     Procedure Component Value Units Date/Time    CT Head Without Contrast [[id number]] Collected:  02/12/19 1725     Updated:  02/12/19 1732    Narrative:       CT HEAD WO CONTRAST-     INDICATIONS: Head injury     TECHNIQUE: Radiation dose reduction techniques were utilized, including  automated exposure control and exposure modulation based on body size.      Noncontrast head CT     COMPARISON: None available     FINDINGS:              No acute intracranial hemorrhage, midline shift or mass effect. No acute  territorial infarct is identified.     Moderate periventricular hypodensities suggest chronic small vessel  ischemic change in a patient this age.     Arterial calcifications are seen in the base of the brain.     Ventricles, cisterns, cerebral sulci are unremarkable for patient age.     The visualized paranasal sinuses, orbits, mastoid air cells are  unremarkable.                   Impression:              No acute intracranial hemorrhage or hydrocephalus. Chronic-appearing  changes of the brain. If there is further clinical concern, MRI could be  considered for further evaluation.     This report was finalized on 2/12/2019 5:29 PM by Dr. Jamshid Atkinson M.D.             Current Facility-Administered Medications:   •  aspirin chewable tablet 81 mg, 81 mg, Oral, Daily, Keith Orr MD, 81 mg at 02/13/19 0926  •  pantoprazole (PROTONIX) EC tablet 40 mg, 40 mg, Oral, Q AM, Keith Orr MD  •  sodium chloride 0.9 % with KCl 20 mEq/L infusion, 75 mL/hr, Intravenous, Continuous, Keith Orr MD, Last Rate: 75 mL/hr at 02/13/19 0813, 75 mL/hr at 02/13/19 0813     ASSESSMENT  Frequent  falls  Left eye droop  Headache  Hypokalemia  Degenerative disc disease    PLAN  Admit  Check MRI of the brain  Neuro consult  Start baby aspirin once a day  Stress ulcer DVT prophylaxis  PTOT  Supportive care  Patient is full code  Subacute rehabilitation once more stable    SIERRA WILLIAMSON MD

## 2019-02-13 NOTE — PROGRESS NOTES
Discharge Planning Assessment  [facility]     Patient Name: Wendi Fofana  MRN: [id number]  Today's Date: 2/13/2019    Admit Date: 2/12/2019    Discharge Needs Assessment     Row Name 02/13/19 1437       Living Environment    Lives With  alone    Current Living Arrangements  home/apartment/condo    Primary Care Provided by  self    Provides Primary Care For  no one, unable/limited ability to care for self    Family Caregiver if Needed  none    Quality of Family Relationships  supportive       Resource/Environmental Concerns    Transportation Concerns  rides, unreliable from others       Transition Planning    Patient/Family Anticipates Transition to  other (see comments) sub acute rehab / SNF       Discharge Needs Assessment    Concerns to be Addressed  adjustment to diagnosis/illness;basic needs;discharge planning;home safety;physical/sexual safety    Equipment Currently Used at Home  cane, quad;walker, standard    Anticipated Changes Related to Illness  inability to care for self    Equipment Needed After Discharge  none    Outpatient/Agency/Support Group Needs  skilled nursing facility    Offered/Gave Vendor List  yes    Current Discharge Risk  lack of support system/caregiver;lives alone;physical impairment        Discharge Plan     Row Name 02/13/19 5742       Plan    Plan Comments  I met with pt, she confirmed the address and pharmacy are correct, pt added she does not take any Rx so there are no medications to afford.   Pt said she lives alone with her cat, has been IADL until the last few day, she said she has been unable to stand the last few days.  Pt said she normally   Walks with a walker but also has a cane.  Pt said she has not been to her PCP (Dr Cornejo) in years. Pt said she has no family to call as her dtr Anahi Silva lives in [address] [place] 6 [place] and currently is in [place]. Pt said he dtr Sandrine Barker ([phone]) lives in [place], she did not want her bothered due to she is in poor  health and has had a kidney and liver transplant. Pt said she has another dtr Pat Boss in [place] ([phone]), she said I should not bother her either since she is at work.  I ask pt how she gets her groceries, she said her dtr Pat somehow arranges for them to be delivered and put in her refrigerator.  Pt said a cousin's  has a taxi and provides her with transportation.  Pt said she has not been to a SNF and is not current with home health. Pt said she is hoping to go to rehab to get stronger in order to return home.   I had placed a call to pt’s dtr Anahi Silva ([phone]), however the voice mail was full and I was not able to leave a voice mail.  Call received from pt’s dtr Anahi Silva, I ask if she was returning my call due to I was not able to leave her a message, she said the number I have on file is her partners,  she said currently she is in [place], she said she saw her mother last in October 2018, she said her sister Sandrine saw her mother 2 weeks ago and did not mention any problems at that time.  Anahi said her partner lives in [place] and checks on her mother at time.   Anahi ask that a copy of the  Observation letter be emailed to her at [email or web address] (per Sonny / CCP dept, letter was emailed as requested).  Anahi said she selected [facility] due to the location and she went to school in the building and they have had a family friend there and was happy with the care.  Anahi said she had tried to get her mother seen at Dr Harmon’s office yesterday as a new patient but was not able to get her there, she ask how she can obtain a PCP for her mother.  I advised I can arrange or she can call the A appointment liaison, she opted to make the call due to the plan is rehab prior to returning home.   Anahi ask that I keep a family friend Elina Menezes ([phone]) updated as she can send updates via internet messages.     Macy with [facility] [phone] updated me that yudelka obtained  from [employer], she said the auth is only good for 2 days.  I reviewed with attending on rounds, he said he does not plan discharge today. Macy updated.   I have updated Elina Menezes that rehab has been approved with [facility] but the dr does not plan discharge today.  I advised I anticipate discharge tomorrow, she wanted ambulance transport, I advised pt is not appropriate to bill Medicare, she said she will transport if people can assist pt into and out of her car.   Prachi Leach RN     Row Name 02/13/19 1401       Plan    Plan  Short term rehab at [facility] (approved for rehab 2/13, cert good for 2 days)        Destination      Service Provider Request Status Selected Services Address Phone Number Fax Number    [facility] Accepted N/A [address], [place] [address] [phone]       Katherine Trujillo RN 2/12/2019 1550    Spoke with Macy, per pt and family request. Pt will need precertification for placement. PT eval/approval pending.   Katherine Trujillo RN                      Demographic Summary     Row Name 02/13/19 1437       General Information    Admission Type  observation    Arrived From  home    Required Notices Provided  Observation Status Notice MOON Observation Letter signed by pt, copy of letter given to her.  Copy of JEFF Letter emailed to pt's dtr Anahi    Referral Source  admission list    Reason for Consult  discharge planning;facility placement    Preferred Language  English       Contact Information    Permission Granted to Share Info With  facility ;family/designee    Contact Information Obtained for  facility         Functional Status     Row Name 02/13/19 1438       Functional Status, IADL    Medications  independent    Meal Preparation  independent    Housekeeping  independent    Laundry  independent    Shopping  independent    IADL Comments  -- prior to recent falls pt said she was IADL       Mental Status Summary     Recent Changes in Mental Status/Cognitive Functioning  no changes     Patient Forms     Row Name 02/13/19 1436       Patient Forms    Provider Choice List  Delivered    Delivered to  Patient    Method of delivery  In person    Patient Observation Letter  Delivered signed by pt, copy given, also a copy was emailed to pt's dtr    Delivered to  Patient    Method of delivery  In person            Prachi Leach RN

## 2019-02-13 NOTE — THERAPY TREATMENT NOTE
Acute Care - Physical Therapy Treatment Note  [facility]     Patient Name: Wendi Fofana  : 1933  MRN: [id number]  Today's Date: 2019             Admit Date: 2019    Visit Dx:    ICD-10-CM ICD-9-CM   1. Weakness R53.1 780.79   2. Immobility Z74.09 799.89     Patient Active Problem List   Diagnosis   • Weakness       Therapy Treatment    Rehabilitation Treatment Summary     Row Name 19 1300             Treatment Time/Intention    Discipline  physical therapy assistant  -CW      Document Type  therapy note (daily note)  -CW      Subjective Information  complains of;weakness;fatigue  -CW      Mode of Treatment  physical therapy  -CW      Therapy Frequency (PT Clinical Impression)  daily  -CW      Patient Effort  adequate  -CW      Existing Precautions/Restrictions  fall  -CW      Recorded by [CW] Daniel Stallworth, PTA 19 1350      Row Name 19 1300             Vital Signs    O2 Delivery Pre Treatment  room air  -CW      Recorded by [CW] Daniel Stallworth, PTA 19 1350      Row Name 19 1300             Cognitive Assessment/Intervention- PT/OT    Orientation Status (Cognition)  oriented x 3  -CW      Follows Commands (Cognition)  WNL  -CW      Personal Safety Interventions  fall prevention program maintained;gait belt;nonskid shoes/slippers when out of bed;muscle strengthening facilitated  -CW      Recorded by [CW] Daniel Stallworth, PTA 19 1350      Row Name 19 1300             Bed Mobility Assessment/Treatment    Bed Mobility Assessment/Treatment  supine-sit;sit-supine  -CW      Supine-Sit [place] (Bed Mobility)  not tested  -CW      Sit-Supine [place] (Bed Mobility)  minimum assist (75% patient effort)  -CW      Assistive Device (Bed Mobility)  bed rails;head of bed elevated  -CW      Recorded by [CW] Daniel Stallworth, PTA 19 1350      Row Name 19 1300             Transfer Assessment/Treatment    Transfer Assessment/Treatment   sit-stand transfer;stand-sit transfer  -CW      Recorded by [CW] Daniel Stallworth, PTA 02/13/19 1350      Row Name 02/13/19 1300             Sit-Stand Transfer    Sit-Stand [place] (Transfers)  minimum assist (75% patient effort)  -CW      Assistive Device (Sit-Stand Transfers)  walker, front-wheeled  -CW      Recorded by [CW] Daniel Stallworth, PTA 02/13/19 1350      Row Name 02/13/19 1300             Stand-Sit Transfer    Stand-Sit [place] (Transfers)  minimum assist (75% patient effort)  -CW      Assistive Device (Stand-Sit Transfers)  walker, front-wheeled  -CW      Recorded by [CW] Daniel Stallworth, PTA 02/13/19 1350      Row Name 02/13/19 1300             Gait/Stairs Assessment/Training    [place] Level (Gait)  minimum assist (75% patient effort);moderate assist (50% patient effort)  -CW      Assistive Device (Gait)  walker, front-wheeled  -CW      Distance in Feet (Gait)  30  -CW      Pattern (Gait)  step-to  -CW      Deviations/Abnormal Patterns (Gait)  stride length decreased;base of support, narrow;margarette decreased  -CW      Bilateral Gait Deviations  forward flexed posture;heel strike decreased  -CW      Comment (Gait/Stairs)  pt fatigues with amb but able to increase distance today  -CW      Recorded by [CW] Daniel Stallworth, [facility] 02/13/19 1350      Row Name 02/13/19 1300             Positioning and Restraints    Pre-Treatment Position  bathroom  -CW      Post Treatment Position  bed  -CW      In Bed  notified nsg;supine;call light within reach;encouraged to call for assist;exit alarm on  -CW      Recorded by [CW] Daniel Stallworth, PTA 02/13/19 1350      Row Name 02/13/19 1300             Outcome Summary/Treatment Plan (PT)    Anticipated Discharge Disposition (PT)  skilled nursing facility  -CW      Recorded by [CW] Daniel Stallworth, [facility] 02/13/19 1350        User Key  (r) = Recorded By, (t) = Taken By, (c) = Cosigned By    Initials Name Effective Dates Discipline    CW  Daniel Stallworth, PTA 03/07/18 -  PT               Rehab Goal Summary     Row Name 02/13/19 1128             Occupational Therapy Goals    Transfer Goal Selection (OT)  transfer, OT goal 1  -SG      Toileting Goal Selection (OT)  toileting, OT goal 1  -SG      Balance Goal Selection (OT)  balance, OT goal 1  -SG         Transfer Goal 1 (OT)    Activity/Assistive Device (Transfer Goal 1, OT)  toilet  -SG      [place] Level/Cues Needed (Transfer Goal 1, OT)  minimum assist (75% or more patient effort)  -SG      Time Frame (Transfer Goal 1, OT)  1 week  -SG      Progress/Outcome (Transfer Goal 1, OT)  goal ongoing  -SG         Toileting Goal 1 (OT)    Activity/Device (Toileting Goal 1, OT)  toileting skills, all  -SG      [place] Level/Cues Needed (Toileting Goal 1, OT)  minimum assist (75% or more patient effort)  -SG      Time Frame (Toileting Goal 1, OT)  1 week  -SG      Progress/Outcome (Toileting Goal 1, OT)  goal ongoing  -SG         Balance Goal 1 (OT)    Activity/Assistive Device (Balance Goal 1, OT)  standing, static to assist with adls  -SG      [place] Level/Cues Needed (Balance Goal 1, OT)  minimum assist (75% or more patient effort)  -SG      Time Frame (Balance Goal 1, OT)  1 week  -SG      Progress/Outcomes (Balance Goal 1, OT)  goal ongoing  -SG        User Key  (r) = Recorded By, (t) = Taken By, (c) = Cosigned By    Initials Name Provider Type Discipline    Laura Jacques OTR Occupational Therapist OT          Physical Therapy Education     Title: PT OT SLP Therapies (In Progress)     Topic: Physical Therapy (In Progress)     Point: Mobility training (In Progress)     Learning Progress Summary           Patient Acceptance, TB,E, NR by CW at 2/13/2019  1:50 PM    Acceptance, D,E, VU,NR by AMIE at 2/12/2019  4:53 PM                   Point: Home exercise program (In Progress)     Learning Progress Summary           Patient Acceptance, TB,E, NR by CW at 2/13/2019  1:50 PM     Acceptance, D,E, VU,NR by  at 2/12/2019  4:53 PM                   Point: Body mechanics (In Progress)     Learning Progress Summary           Patient Acceptance, TB,E, NR by  at 2/13/2019  1:50 PM    Acceptance, D,E, VU,NR by  at 2/12/2019  4:53 PM                   Point: Precautions (In Progress)     Learning Progress Summary           Patient Acceptance, TB,E, NR by  at 2/13/2019  1:50 PM    Acceptance, D,E, VU,NR by  at 2/12/2019  4:53 PM                               User Key     Initials Effective Dates Name Provider Type Discipline     02/05/19 -  Marianna Alcala, PT Physical Therapist PT     03/07/18 -  Daniel Stallworth, PTA Physical Therapy Assistant PT                PT Recommendation and Plan  Anticipated Discharge Disposition (PT): skilled nursing facility  Therapy Frequency (PT Clinical Impression): daily  Outcome Summary/Treatment Plan (PT)  Anticipated Discharge Disposition (PT): skilled nursing facility  Plan of Care Reviewed With: patient  Progress: improving  Outcome Summary: Pt increasing with amb distance and bed mobility but still fatigues with amb and requires encouragement to continue to amb  Outcome Measures     Row Name 02/13/19 1300 02/13/19 1157 02/12/19 1600       How much help from another person do you currently need...    Turning from your back to your side while in flat bed without using bedrails?  3  -CW  --  3  -KH    Moving from lying on back to sitting on the side of a flat bed without bedrails?  3  -CW  --  3  -KH    Moving to and from a bed to a chair (including a wheelchair)?  3  -CW  --  2  -KH    Standing up from a chair using your arms (e.g., wheelchair, bedside chair)?  3  -CW  --  3  -KH    Climbing 3-5 steps with a railing?  1  -CW  --  1  -KH    To walk in hospital room?  2  -CW  --  2  -KH    AM-PAC 6 Clicks Score  15  -CW  --  14  -KH       How much help from another is currently needed...    Putting on and taking off regular lower body  clothing?  --  3  -SG  --    Bathing (including washing, rinsing, and drying)  --  3  -SG  --    Toileting (which includes using toilet bed pan or urinal)  --  3  -SG  --    Putting on and taking off regular upper body clothing  --  3  -SG  --    Taking care of personal grooming (such as brushing teeth)  --  3  -SG  --    Eating meals  --  4  -SG  --    Score  --  19  -SG  --       Functional Assessment    Outcome Measure Options  AM-PAC 6 Clicks Basic Mobility (PT)  -CW  AM-PAC 6 Clicks Daily Activity (OT)  -SG  AM-PAC 6 Clicks Basic Mobility (PT)  -      User Key  (r) = Recorded By, (t) = Taken By, (c) = Cosigned By    Initials Name Provider Type    Marianna Segal, PT Physical Therapist    Laura Jacquse, OTR Occupational Therapist    Daniel Sidhu PTA Physical Therapy Assistant         Time Calculation:   PT Charges     Row Name 02/13/19 1351             Time Calculation    Start Time  1325  -CW      Stop Time  1351  -CW      Time Calculation (min)  26 min  -CW      PT Received On  02/13/19  -CW      PT - Next Appointment  02/14/19  -        User Key  (r) = Recorded By, (t) = Taken By, (c) = Cosigned By    Initials Name Provider Type    Daniel Sidhu PTA Physical Therapy Assistant        Therapy Suggested Charges     Code   Minutes Charges    None           Therapy Charges for Today     Code Description Service Date Service Provider Modifiers Qty    55375934021 HC PT THER PROC EA 15 MIN 2/13/2019 Daniel Stallworth PTA GP 2    [id number] HC PT THER SUPP EA 15 MIN 2/13/2019 Daniel Stallworth PTA GP 2          PT G-Codes  Outcome Measure Options: AM-PAC 6 Clicks Basic Mobility (PT)  AM-PAC 6 Clicks Score: 15  Score: 19    Daniel Stallworth PTA  2/13/2019

## 2019-02-13 NOTE — THERAPY EVALUATION
Acute Care - Occupational Therapy Initial Evaluation  [facility]     Patient Name: Wendi Fofana  : 1933  MRN: [id number]  Today's Date: 2019             Admit Date: 2019       ICD-10-CM ICD-9-CM   1. Weakness R53.1 780.79   2. Immobility Z74.09 799.89     Patient Active Problem List   Diagnosis   • Weakness     History reviewed. No pertinent past medical history.  History reviewed. No pertinent surgical history.       OT ASSESSMENT FLOWSHEET (last 72 hours)      Occupational Therapy Evaluation     Row Name 19 1128                   OT Evaluation Time/Intention    Subjective Information  complains of;weakness  -SG        Document Type  evaluation  -SG        Mode of Treatment  occupational therapy  -SG        Patient Effort  adequate  -SG        Symptoms Noted During/After Treatment  fatigue  -SG           General Information    Patient Profile Reviewed?  yes  -SG        Patient Observations  alert;cooperative;agree to therapy  -SG        General Observations of Patient  sitting in chair  -SG        Prior Level of Function  independent:  -SG        Existing Precautions/Restrictions  fall  -SG           Cognitive Assessment/Intervention- PT/OT    Orientation Status (Cognition)  oriented x 3  -SG        Follows Commands (Cognition)  WFL  -SG           ADL Assessment/Intervention    BADL Assessment/Intervention  lower body dressing  -SG           Lower Body Dressing Assessment/Training    Lower Body Dressing [place] Level  lower body dressing skills;supervision  -SG        Lower Body Dressing Position  supported sitting sitting in chair  -SG           BADL Safety/Performance    Impairments, BADL Safety/Performance  endurance/activity tolerance  -SG        Skilled BADL Treatment/Intervention  BADL process/adaptation training  -SG           General ROM    GENERAL ROM COMMENTS  BUE's WFL AROM  -SG           Sensory Assessment/Intervention    Sensory General Assessment  no sensation deficits  identified BUE's  -SG           Positioning and Restraints    Pre-Treatment Position  sitting in chair/recliner  -SG        Post Treatment Position  chair  -SG        In Chair  sitting;call light within reach;encouraged to call for assist;exit alarm on  -SG           Pain Assessment    Additional Documentation  Pain Scale: Word Pre/Post-Treatment (Group)  -SG           Pain Scale: Word Pre/Post-Treatment    Pain: Word Scale, Pretreatment  2 - mild pain  -SG        Pain: Word Scale, Post-Treatment  2 - mild pain  -SG           Clinical Impression (OT)    OT Diagnosis  need for assist with personal care  -SG        Criteria for Skilled Therapeutic Interventions Met (OT Eval)  yes;treatment indicated  -SG        Therapy Frequency (OT Eval)  5 times/wk  -SG        Care Plan Review (OT)  evaluation/treatment results reviewed  -SG           Planned OT Interventions    Planned Therapy Interventions (OT Eval)  activity tolerance training;BADL retraining;transfer/mobility retraining  -SG           OT Goals    Transfer Goal Selection (OT)  transfer, OT goal 1  -SG        Toileting Goal Selection (OT)  toileting, OT goal 1  -SG        Balance Goal Selection (OT)  balance, OT goal 1  -SG        Additional Documentation  Balance Goal Selection (OT) (Row)  -SG           Transfer Goal 1 (OT)    Activity/Assistive Device (Transfer Goal 1, OT)  toilet  -SG        [place] Level/Cues Needed (Transfer Goal 1, OT)  minimum assist (75% or more patient effort)  -SG        Time Frame (Transfer Goal 1, OT)  1 week  -SG        Progress/Outcome (Transfer Goal 1, OT)  goal ongoing  -SG           Toileting Goal 1 (OT)    Activity/Device (Toileting Goal 1, OT)  toileting skills, all  -SG        [place] Level/Cues Needed (Toileting Goal 1, OT)  minimum assist (75% or more patient effort)  -SG        Time Frame (Toileting Goal 1, OT)  1 week  -SG        Progress/Outcome (Toileting Goal 1, OT)  goal ongoing  -SG           Balance Goal 1  (OT)    Activity/Assistive Device (Balance Goal 1, OT)  standing, static to assist with adls  -SG        [place] Level/Cues Needed (Balance Goal 1, OT)  minimum assist (75% or more patient effort)  -        Time Frame (Balance Goal 1, OT)  1 week  -SG        Progress/Outcomes (Balance Goal 1, OT)  goal ongoing  -          User Key  (r) = Recorded By, (t) = Taken By, (c) = Cosigned By    Initials Name Effective Dates     Laura Mcdonald OTR 06/08/18 -          Occupational Therapy Education     Title: PT OT SLP Therapies (In Progress)     Topic: Occupational Therapy (In Progress)     Point: ADL training (In Progress)     Description: Instruct learner(s) on proper safety adaptation and remediation techniques during self care or transfers.   Instruct in proper use of assistive devices.    Learning Progress Summary           Patient Acceptance, E,TB,D, NR by  at 2/13/2019 11:55 AM    Comment:  role of OT and safety for adls                               User Key     Initials Effective Dates Name Provider Type Discipline     06/08/18 -  Laura Mcdonald OTR Occupational Therapist OT                  OT Recommendation and Plan  Planned Therapy Interventions (OT Eval): activity tolerance training, BADL retraining, transfer/mobility retraining  Therapy Frequency (OT Eval): 5 times/wk  Plan of Care Review  Plan of Care Reviewed With: patient  Plan of Care Reviewed With: patient  Outcome Summary: Pt will continue to benefit from OT for increasing functional strength and endurance to assist with functional standing balance for adls and functional transfers    Outcome Measures     Row Name 02/13/19 1157 02/12/19 1600          How much help from another person do you currently need...    Turning from your back to your side while in flat bed without using bedrails?  --  3  -KH     Moving from lying on back to sitting on the side of a flat bed without bedrails?  --  3  -KH     Moving to and from a bed to a chair  (including a wheelchair)?  --  2  -KH     Standing up from a chair using your arms (e.g., wheelchair, bedside chair)?  --  3  -KH     Climbing 3-5 steps with a railing?  --  1  -KH     To walk in hospital room?  --  2  -KH     AM-PAC 6 Clicks Score  --  14  -KH        How much help from another is currently needed...    Putting on and taking off regular lower body clothing?  3  -SG  --     Bathing (including washing, rinsing, and drying)  3  -SG  --     Toileting (which includes using toilet bed pan or urinal)  3  -SG  --     Putting on and taking off regular upper body clothing  3  -SG  --     Taking care of personal grooming (such as brushing teeth)  3  -SG  --     Eating meals  4  -SG  --     Score  19  -SG  --        Functional Assessment    Outcome Measure Options  AM-PAC 6 Clicks Daily Activity (OT)  -  AM-PAC 6 Clicks Basic Mobility (PT)  -       User Key  (r) = Recorded By, (t) = Taken By, (c) = Cosigned By    Initials Name Provider Type     Marianna Alcala, PT Physical Therapist    Laura Jacques OTR Occupational Therapist          Time Calculation:   Time Calculation- OT     Row Name 02/13/19 1157             Time Calculation- OT    OT Start Time  1024  -      OT Stop Time  1042  -      OT Time Calculation (min)  18 min  -      Total Timed Code Minutes- OT  9 minute(s)  -      OT Received On  02/13/19  -      OT Goal Re-Cert Due Date  02/20/19  -        User Key  (r) = Recorded By, (t) = Taken By, (c) = Cosigned By    Initials Name Provider Type    Laura Jacques OTR Occupational Therapist        Therapy Suggested Charges     Code   Minutes Charges    None           Therapy Charges for Today     Code Description Service Date Service Provider Modifiers Qty    46548531449 HC OT EVAL LOW COMPLEXITY 2 2/13/2019 Laura Mcdonald OTR GO 1    [id number]  OT SELF CARE/MGMT/TRAIN EA 15 MIN 2/13/2019 Laura Mcdonald OTR GO 1               ELOISA Ca  2/13/2019

## 2019-02-13 NOTE — PLAN OF CARE
Problem: Patient Care Overview  Goal: Plan of Care Review  Outcome: Ongoing (interventions implemented as appropriate)   02/13/19 7305   OTHER   Outcome Summary Pt will continue to benefit from OT for increasing functional strength and endurance to assist with functional standing balance for adls and functional transfers   Coping/Psychosocial   Plan of Care Reviewed With patient

## 2019-02-13 NOTE — CONSULTS
Patient Identification:  NAME:  Wendi Fofana  Age:  85 y.o.   Sex:  female   :  1933   MRN:  [id number]       Chief complaint: She has no chief complaint/ reason for consult falls and left eye ptosis some headache    History of present illness: This patient is an 85-year-old right-handed white female who is come to the hospital after several falls in 1 week at some point she complained of a headache but she denies that now she is also noted to have left eye droopiness she denies double vision she is also got the symptoms associated of confusion quality confusion and rambling speech context the patient who is not apparently had a stroke in the past and has not had this previous ptosis.  Quality is ptosis of the left eye associated symptoms some falls and ataxia she has had an MRI scan with results still pending      Past medical history:  History reviewed. No pertinent past medical history.    Past surgical history:  History reviewed. No pertinent surgical history.    Allergies:  Penicillins    Home medications:  No medications prior to admission.        Hospital medications:    aspirin 81 mg Oral Daily   pantoprazole 40 mg Oral Q AM       sodium chloride 0.9 % with KCl 20 mEq 75 mL/hr Last Rate: 75 mL/hr (19 1331)         Family history:  History reviewed. No pertinent family history.    Social history:  Social History     Tobacco Use   • Smoking status: Never Smoker   Substance Use Topics   • Alcohol use: No     Frequency: Never   • Drug use: Not on file       Review of systems:    She cannot tell me anything that is pertinent no family is present I reviewed the chart fully    Objective:  Vitals Ranges:   Temp:  [97.7 °F (36.5 °C)-99.3 °F (37.4 °C)] 99.3 °F (37.4 °C)  Heart Rate:  [59-74] 59  Resp:  [16] 16  BP: (116-174)/(68-89) 116/72      Physical Exam:  Awake alert rambling speech she talks the entire time during her interview she is not oriented beyond her own name fund of knowledge poor  attention span and concentration fair recent remote memory poor language function rambling speech dysarthria is mild no a aphasia elderly in no distress has a left ptosis she denies double vision.  Extraocular movements I believe are full on both sides and pupils are 1-1/2 constricting to 1 at rest the left eye may go out and down slightly.  Face palate tongue symmetrical decreased hearing normal facial sensation head turning shoulder shrug motor very hard to tell 5- out of 5 x4 very poor effort reflexes absent throughout distal atrophy noted no fasciculations or resting tremor reflexes as noted trace throughout symmetrical toes downgoing bilaterally sensation coordination station and gait she could not follow any of this she does ambulate without ataxia heart regular without murmur neck supple without bruits extremities no clubbing cyanosis edema visual acuity normal at 3 feet.    Results review:   I reviewed the patient's new clinical results.    Data review:  Lab Results (last 24 hours)     Procedure Component Value Units Date/Time    Urinalysis With Microscopic If Indicated (No Culture) - Urine, Clean Catch [[id number]]  (Abnormal) Collected:  02/12/19 2343    Specimen:  Urine, Clean Catch Updated:  02/12/19 2358     Color, UA Yellow     Appearance, UA Clear     pH, UA 5.5     Specific Gravity, UA 1.011     Glucose, UA Negative     Ketones, UA Negative     Bilirubin, UA Negative     Blood, UA Negative     Protein, UA Negative     Leuk Esterase, UA Trace     Nitrite, UA Negative     Urobilinogen, UA 0.2 E.U./dL    Urinalysis, Microscopic Only - Urine, Clean Catch [[id number]] Collected:  02/12/19 2343    Specimen:  Urine, Clean Catch Updated:  02/12/19 2358     RBC, UA 0-2 /HPF      WBC, UA 0-2 /HPF      Bacteria, UA None Seen /HPF      Squamous Epithelial Cells, UA 0-2 /HPF      Hyaline Casts, UA 0-2 /LPF      Methodology Automated Microscopy    Basic Metabolic Panel [[id number]]  (Abnormal) Collected:  02/12/19  1848    Specimen:  Blood Updated:  02/12/19 1924     Glucose 92 mg/dL      BUN 17 mg/dL      Creatinine 0.69 mg/dL      Sodium 141 mmol/L      Potassium 3.3 mmol/L      Chloride 101 mmol/L      CO2 26.6 mmol/L      Calcium 8.9 mg/dL      eGFR Non African Amer 81 mL/min/1.73      BUN/Creatinine Ratio 24.6     Anion Gap 13.4 mmol/L     Narrative:       The MDRD GFR formula is only valid for adults with stable renal function between ages 18 and 70.    CBC & Differential [[id number]] Collected:  02/12/19 1848    Specimen:  Blood Updated:  02/12/19 1902    Narrative:       The following orders were created for panel order CBC & Differential.  Procedure                               Abnormality         Status                     ---------                               -----------         ------                     CBC Auto Differential[[id number]]        Normal              Final result                 Please view results for these tests on the individual orders.    CBC Auto Differential [[id number]]  (Normal) Collected:  02/12/19 1848    Specimen:  Blood Updated:  02/12/19 1902     WBC 4.99 10*3/mm3      RBC 4.19 10*6/mm3      Hemoglobin 12.6 g/dL      Hematocrit 39.3 %      MCV 93.8 fL      MCH 30.1 pg      MCHC 32.1 g/dL      RDW 14.2 %      RDW-SD 48.8 fl      MPV 9.4 fL      Platelets 183 10*3/mm3      Neutrophil % 62.4 %      Lymphocyte % 23.8 %      Monocyte % 11.4 %      Eosinophil % 1.4 %      Basophil % 0.6 %      Immature Grans % 0.4 %      Neutrophils, Absolute 3.11 10*3/mm3      Lymphocytes, Absolute 1.19 10*3/mm3      Monocytes, Absolute 0.57 10*3/mm3      Eosinophils, Absolute 0.07 10*3/mm3      Basophils, Absolute 0.03 10*3/mm3      Immature Grans, Absolute 0.02 10*3/mm3      nRBC 0.0 /100 WBC            Imaging:  Imaging Results (last 24 hours)     Procedure Component Value Units Date/Time    MRI Brain With & Without Contrast [[id number]] Updated:  02/13/19 1538    XR Chest 1 View [[id number]] Collected:   02/13/19 1426     Updated:  02/13/19 1427    Narrative:       AP CHEST 02/13/2019     HISTORY: Weakness. Frequent falls. Headache.     FINDINGS: The heart size is at the upper limits of normal. There is mild  vascular congestion and interstitial prominence of the lungs. There is a  small ill-defined nodular density in the left midlung measuring  approximately 7 mm to 8mm in size.     There are no previous studies available for comparison.       Impression:       1. Mild interstitial prominence of the lungs, could represent chronic  interstitial fibrosis versus some mild interstitial edema.  2. Small nodular density in the left midlung.  3. Recommend correlation to any previous chest radiograph. If none are  available CT of the chest with contrast suggested.       CT Head Without Contrast [[id number]] Collected:  02/12/19 1725     Updated:  02/12/19 1732    Narrative:       CT HEAD WO CONTRAST-     INDICATIONS: Head injury     TECHNIQUE: Radiation dose reduction techniques were utilized, including  automated exposure control and exposure modulation based on body size.      Noncontrast head CT     COMPARISON: None available     FINDINGS:              No acute intracranial hemorrhage, midline shift or mass effect. No acute  territorial infarct is identified.     Moderate periventricular hypodensities suggest chronic small vessel  ischemic change in a patient this age.     Arterial calcifications are seen in the base of the brain.     Ventricles, cisterns, cerebral sulci are unremarkable for patient age.     The visualized paranasal sinuses, orbits, mastoid air cells are  unremarkable.                   Impression:              No acute intracranial hemorrhage or hydrocephalus. Chronic-appearing  changes of the brain. If there is further clinical concern, MRI could be  considered for further evaluation.     This report was finalized on 2/12/2019 5:29 PM by Dr. Jamshid Atkinson M.D.                Assessment and Plan:      First of all, this patient has a significant metabolic encephalopathy and is very delirious at this time she never shots up the entire time through our interview.  I think she would do much better with a tiny dose of the neuroleptic medication each night at supper I will initiate this Abilify 5 mg p.o. q. supper.  This is very QT interval friendly.    She does have left ptosis and we will see what the MRI scan shows first further recommendations can be made.  This could easily be a stroke syndrome and the first step is to check the MRI before proceeding with any other testing thanks       Volodymyr Aldridge MD  02/13/19  4:21 PM

## 2019-02-14 ENCOUNTER — APPOINTMENT (OUTPATIENT)
Dept: CT IMAGING | Facility: HOSPITAL | Age: 84
End: 2019-02-14

## 2019-02-14 VITALS
OXYGEN SATURATION: 97 % | WEIGHT: 109.35 LBS | RESPIRATION RATE: 16 BRPM | SYSTOLIC BLOOD PRESSURE: 141 MMHG | TEMPERATURE: 98.8 F | BODY MASS INDEX: 20.65 KG/M2 | HEART RATE: 63 BPM | HEIGHT: 61 IN | DIASTOLIC BLOOD PRESSURE: 83 MMHG

## 2019-02-14 LAB
ALBUMIN SERPL-MCNC: 3.3 G/DL (ref 3.5–5.2)
ALBUMIN/GLOB SERPL: 1.1 G/DL
ALP SERPL-CCNC: 45 U/L (ref 39–117)
ALT SERPL W P-5'-P-CCNC: 16 U/L (ref 1–33)
ANION GAP SERPL CALCULATED.3IONS-SCNC: 8.5 MMOL/L
AST SERPL-CCNC: 20 U/L (ref 1–32)
BASOPHILS # BLD AUTO: 0.03 10*3/MM3 (ref 0–0.2)
BASOPHILS NFR BLD AUTO: 0.9 % (ref 0–1.5)
BILIRUB SERPL-MCNC: 0.4 MG/DL (ref 0.1–1.2)
BUN BLD-MCNC: 6 MG/DL (ref 8–23)
BUN/CREAT SERPL: 9.4 (ref 7–25)
CALCIUM SPEC-SCNC: 8.3 MG/DL (ref 8.6–10.5)
CHLORIDE SERPL-SCNC: 105 MMOL/L (ref 98–107)
CHOLEST SERPL-MCNC: 148 MG/DL (ref 0–200)
CO2 SERPL-SCNC: 25.5 MMOL/L (ref 22–29)
CREAT BLD-MCNC: 0.64 MG/DL (ref 0.57–1)
DEPRECATED RDW RBC AUTO: 52.7 FL (ref 37–54)
EOSINOPHIL # BLD AUTO: 0.15 10*3/MM3 (ref 0–0.4)
EOSINOPHIL NFR BLD AUTO: 4.3 % (ref 0.3–6.2)
ERYTHROCYTE [DISTWIDTH] IN BLOOD BY AUTOMATED COUNT: 14.5 % (ref 12.3–15.4)
GFR SERPL CREATININE-BSD FRML MDRD: 88 ML/MIN/1.73
GLOBULIN UR ELPH-MCNC: 3 GM/DL
GLUCOSE BLD-MCNC: 96 MG/DL (ref 65–99)
HBA1C MFR BLD: 5.42 % (ref 4.8–5.6)
HCT VFR BLD AUTO: 34.6 % (ref 34–46.6)
HDLC SERPL-MCNC: 75 MG/DL (ref 40–60)
HGB BLD-MCNC: 10.7 G/DL (ref 12–15.9)
IMM GRANULOCYTES # BLD AUTO: 0.02 10*3/MM3 (ref 0–0.05)
IMM GRANULOCYTES NFR BLD AUTO: 0.6 % (ref 0–0.5)
LDLC SERPL CALC-MCNC: 63 MG/DL (ref 0–100)
LDLC/HDLC SERPL: 0.84 {RATIO}
LYMPHOCYTES # BLD AUTO: 1.09 10*3/MM3 (ref 0.7–3.1)
LYMPHOCYTES NFR BLD AUTO: 31.2 % (ref 19.6–45.3)
MCH RBC QN AUTO: 30.4 PG (ref 26.6–33)
MCHC RBC AUTO-ENTMCNC: 30.9 G/DL (ref 31.5–35.7)
MCV RBC AUTO: 98.3 FL (ref 79–97)
MONOCYTES # BLD AUTO: 0.41 10*3/MM3 (ref 0.1–0.9)
MONOCYTES NFR BLD AUTO: 11.7 % (ref 5–12)
NEUTROPHILS # BLD AUTO: 1.79 10*3/MM3 (ref 1.4–7)
NEUTROPHILS NFR BLD AUTO: 51.3 % (ref 42.7–76)
NRBC BLD AUTO-RTO: 0 /100 WBC (ref 0–0)
NT-PROBNP SERPL-MCNC: 257.4 PG/ML (ref 0–1800)
PLATELET # BLD AUTO: 155 10*3/MM3 (ref 140–450)
PMV BLD AUTO: 9.5 FL (ref 6–12)
POTASSIUM BLD-SCNC: 4.1 MMOL/L (ref 3.5–5.2)
PROT SERPL-MCNC: 6.3 G/DL (ref 6–8.5)
RBC # BLD AUTO: 3.52 10*6/MM3 (ref 3.77–5.28)
SODIUM BLD-SCNC: 139 MMOL/L (ref 136–145)
TRIGL SERPL-MCNC: 50 MG/DL (ref 0–150)
TSH SERPL DL<=0.05 MIU/L-ACNC: 2.58 MIU/ML (ref 0.27–4.2)
VLDLC SERPL-MCNC: 10 MG/DL (ref 5–40)
WBC NRBC COR # BLD: 3.49 10*3/MM3 (ref 3.4–10.8)

## 2019-02-14 PROCEDURE — 84443 ASSAY THYROID STIM HORMONE: CPT | Performed by: HOSPITALIST

## 2019-02-14 PROCEDURE — 25810000003 SODIUM CHLORIDE 0.9 % WITH KCL 20 MEQ 20-0.9 MEQ/L-% SOLUTION: Performed by: HOSPITALIST

## 2019-02-14 PROCEDURE — 80053 COMPREHEN METABOLIC PANEL: CPT | Performed by: HOSPITALIST

## 2019-02-14 PROCEDURE — G0378 HOSPITAL OBSERVATION PER HR: HCPCS

## 2019-02-14 PROCEDURE — 83519 RIA NONANTIBODY: CPT | Performed by: PSYCHIATRY & NEUROLOGY

## 2019-02-14 PROCEDURE — 99213 OFFICE O/P EST LOW 20 MIN: CPT | Performed by: PSYCHIATRY & NEUROLOGY

## 2019-02-14 PROCEDURE — 83880 ASSAY OF NATRIURETIC PEPTIDE: CPT | Performed by: HOSPITALIST

## 2019-02-14 PROCEDURE — 70496 CT ANGIOGRAPHY HEAD: CPT

## 2019-02-14 PROCEDURE — 85025 COMPLETE CBC W/AUTO DIFF WBC: CPT | Performed by: HOSPITALIST

## 2019-02-14 PROCEDURE — 80061 LIPID PANEL: CPT | Performed by: HOSPITALIST

## 2019-02-14 PROCEDURE — 86255 FLUORESCENT ANTIBODY SCREEN: CPT | Performed by: PSYCHIATRY & NEUROLOGY

## 2019-02-14 PROCEDURE — 25010000002 IOPAMIDOL 61 % SOLUTION: Performed by: HOSPITALIST

## 2019-02-14 PROCEDURE — 83036 HEMOGLOBIN GLYCOSYLATED A1C: CPT | Performed by: HOSPITALIST

## 2019-02-14 PROCEDURE — 97110 THERAPEUTIC EXERCISES: CPT

## 2019-02-14 PROCEDURE — 96361 HYDRATE IV INFUSION ADD-ON: CPT

## 2019-02-14 RX ORDER — ARIPIPRAZOLE 5 MG/1
5 TABLET ORAL
Qty: 30 TABLET | Refills: 0 | Status: SHIPPED | OUTPATIENT
Start: 2019-02-14 | End: 2019-03-04 | Stop reason: HOSPADM

## 2019-02-14 RX ORDER — ASPIRIN 81 MG/1
81 TABLET, CHEWABLE ORAL DAILY
Qty: 30 TABLET | Refills: 0 | Status: SHIPPED | OUTPATIENT
Start: 2019-02-15 | End: 2019-03-17

## 2019-02-14 RX ORDER — PANTOPRAZOLE SODIUM 40 MG/1
40 TABLET, DELAYED RELEASE ORAL DAILY
Qty: 30 TABLET | Refills: 0 | Status: SHIPPED | OUTPATIENT
Start: 2019-02-14 | End: 2019-03-04 | Stop reason: HOSPADM

## 2019-02-14 RX ADMIN — ASPIRIN 81 MG: 81 TABLET, CHEWABLE ORAL at 09:27

## 2019-02-14 RX ADMIN — POTASSIUM CHLORIDE AND SODIUM CHLORIDE 75 ML/HR: 900; 150 INJECTION, SOLUTION INTRAVENOUS at 04:08

## 2019-02-14 RX ADMIN — PANTOPRAZOLE SODIUM 40 MG: 40 TABLET, DELAYED RELEASE ORAL at 07:13

## 2019-02-14 RX ADMIN — IOPAMIDOL 95 ML: 612 INJECTION, SOLUTION INTRAVENOUS at 13:00

## 2019-02-14 NOTE — PLAN OF CARE
Problem: Patient Care Overview  Goal: Plan of Care Review  Outcome: Ongoing (interventions implemented as appropriate)   02/14/19 0624   OTHER   Outcome Summary vss, maitained fall precaution, ct head w/ contrast pt refused to do it last night, will decide it in the morning, neuro checked q4h, up wth assist, will continue to monitor the pt.   Coping/Psychosocial   Plan of Care Reviewed With patient   Plan of Care Review   Progress improving     Goal: Individualization and Mutuality  Outcome: Ongoing (interventions implemented as appropriate)    Goal: Discharge Needs Assessment  Outcome: Ongoing (interventions implemented as appropriate)    Goal: Interprofessional Rounds/Family Conf  Outcome: Ongoing (interventions implemented as appropriate)      Problem: Fall Risk (Adult)  Goal: Identify Related Risk Factors and Signs and Symptoms  Outcome: Ongoing (interventions implemented as appropriate)    Goal: Absence of Fall  Outcome: Ongoing (interventions implemented as appropriate)      Problem: Skin Injury Risk (Adult)  Goal: Skin Health and Integrity  Outcome: Ongoing (interventions implemented as appropriate)

## 2019-02-14 NOTE — PROGRESS NOTES
"Daily progress note    Chief complaint  Doing better  No new complaints    History of present illness  85-year-old white female with no medical problems to medications who lives by herself presented to [facility] emergency room with frequent falls 3 times in 1 week.  Patient fully alert oriented also complaining of headache and left eye drooping which she noticed recently.  Patient denies any weakness of arms and legs and also denies any numbness tingling speech problem or difficulty swallowing.  Patient has no vision problem either.  Patient evaluated in ER admitted for management.     REVIEW OF SYSTEMS  Review of Systems   Constitutional: Negative for fever.        Frequent falls   HENT: Negative for sore throat.    Eyes: Positive for visual disturbance (diplopia).        Left eye droop   Respiratory: Negative for cough and shortness of breath.    Cardiovascular: Negative for chest pain.   Gastrointestinal: Negative for abdominal pain, diarrhea and vomiting.   Genitourinary: Negative for dysuria.   Musculoskeletal: Negative for neck pain.   Skin: Negative for rash.   Neurological: Positive for weakness (generalized). Negative for numbness and headaches.   Hematological: Negative.    Psychiatric/Behavioral: Negative.    All other systems reviewed and are negative.     PHYSICAL EXAM  Blood pressure 152/88, pulse 63, temperature 97.6 °F (36.4 °C), temperature source Oral, resp. rate 16, height 154.9 cm (61\"), weight 49.6 kg (109 lb 5.6 oz), SpO2 94 %.    Constitutional: She is oriented to person, place, and time and well-developed, well-nourished, and in no distress. No distress.   Mouth/Throat: Mucous membranes are normal.   Eyes: EOM are normal.   Cardiovascular: Normal rate and regular rhythm. Exam reveals no gallop and no friction rub.   No murmur heard.  Pulmonary/Chest: Effort normal. No respiratory distress. She has no wheezes. She has no rhonchi. She has no rales. Breath sounds are symmetric. "   Abdominal: Soft. She exhibits no distension. There is no tenderness. There is no guarding.   Musculoskeletal: Normal range of motion. She exhibits no deformity.   Neurological: She is alert and oriented to person, place, and time. moving all extremities, no focal deficits  Left upper eyelid droop, eye positioning is normal and she has normal EOM   Skin: Skin is warm and dry.   Psychiatric: Calm, cooperative      LAB RESULTS  Lab Results (last 24 hours)     Procedure Component Value Units Date/Time    CBC & Differential [[id number]] Collected:  02/14/19 0629    Specimen:  Blood Updated:  02/14/19 0753    Narrative:       The following orders were created for panel order CBC & Differential.  Procedure                               Abnormality         Status                     ---------                               -----------         ------                     CBC Auto Differential[[id number]]        Abnormal            Final result                 Please view results for these tests on the individual orders.    CBC Auto Differential [[id number]]  (Abnormal) Collected:  02/14/19 0629    Specimen:  Blood Updated:  02/14/19 0753     WBC 3.49 10*3/mm3      RBC 3.52 10*6/mm3      Hemoglobin 10.7 g/dL      Hematocrit 34.6 %      MCV 98.3 fL      MCH 30.4 pg      MCHC 30.9 g/dL      RDW 14.5 %      RDW-SD 52.7 fl      MPV 9.5 fL      Platelets 155 10*3/mm3      Neutrophil % 51.3 %      Lymphocyte % 31.2 %      Monocyte % 11.7 %      Eosinophil % 4.3 %      Basophil % 0.9 %      Immature Grans % 0.6 %      Neutrophils, Absolute 1.79 10*3/mm3      Lymphocytes, Absolute 1.09 10*3/mm3      Monocytes, Absolute 0.41 10*3/mm3      Eosinophils, Absolute 0.15 10*3/mm3      Basophils, Absolute 0.03 10*3/mm3      Immature Grans, Absolute 0.02 10*3/mm3      nRBC 0.0 /100 WBC     BNP [[id number]]  (Normal) Collected:  02/14/19 0629    Specimen:  Blood Updated:  02/14/19 0745     proBNP 257.4 pg/mL     Narrative:       Among patients  with dyspnea, NT-proBNP is highly sensitive for the detection of acute congestive heart failure. In addition NT-proBNP of <300 pg/ml effectively rules out acute congestive heart failure with 99% negative predictive value.    TSH [[id number]]  (Normal) Collected:  02/14/19 0629    Specimen:  Blood Updated:  02/14/19 0745     TSH 2.580 mIU/mL     Comprehensive Metabolic Panel [[id number]]  (Abnormal) Collected:  02/14/19 0629    Specimen:  Blood Updated:  02/14/19 0729     Glucose 96 mg/dL      BUN 6 mg/dL      Creatinine 0.64 mg/dL      Sodium 139 mmol/L      Potassium 4.1 mmol/L      Chloride 105 mmol/L      CO2 25.5 mmol/L      Calcium 8.3 mg/dL      Total Protein 6.3 g/dL      Albumin 3.30 g/dL      ALT (SGPT) 16 U/L      AST (SGOT) 20 U/L      Alkaline Phosphatase 45 U/L      Total Bilirubin 0.4 mg/dL      eGFR Non African Amer 88 mL/min/1.73      Globulin 3.0 gm/dL      A/G Ratio 1.1 g/dL      BUN/Creatinine Ratio 9.4     Anion Gap 8.5 mmol/L     Narrative:       The MDRD GFR formula is only valid for adults with stable renal function between ages 18 and 70.    Lipid Panel [[id number]]  (Abnormal) Collected:  02/14/19 0629    Specimen:  Blood Updated:  02/14/19 0728     Total Cholesterol 148 mg/dL      Triglycerides 50 mg/dL      HDL Cholesterol 75 mg/dL      LDL Cholesterol  63 mg/dL      VLDL Cholesterol 10 mg/dL      LDL/HDL Ratio 0.84    Narrative:       Cholesterol Reference Ranges  (U.S. Department of Health and Human Services ATP III Classifications)    Desirable          <200 mg/dL  Borderline High    200-239 mg/dL  High Risk          >240 mg/dL      Triglyceride Reference Ranges  (U.S. Department of Health and Human Services ATP III Classifications)    Normal           <150 mg/dL  Borderline High  150-199 mg/dL  High             200-499 mg/dL  Very High        >500 mg/dL    HDL Reference Ranges  (U.S. Department of Health and Human Services ATP III Classifcations)    Low     <40 mg/dl (major risk factor  for CHD)  High    >60 mg/dl ('negative' risk factor for CHD)        LDL Reference Ranges  (U.S. Department of Health and Human Services ATP III Classifcations)    Optimal          <100 mg/dL  Near Optimal     100-129 mg/dL  Borderline High  130-159 mg/dL  High             160-189 mg/dL  Very High        >189 mg/dL    Hemoglobin A1c [[id number]]  (Normal) Collected:  02/14/19 0629    Specimen:  Blood Updated:  02/14/19 0712     Hemoglobin A1C 5.42 %     Narrative:       Hemoglobin A1C Ranges:    Increased Risk for Diabetes  5.7% to 6.4%  Diabetes                     >= 6.5%  Diabetic Goal                < 7.0%        Imaging Results (last 24 hours)     Procedure Component Value Units Date/Time    CT Angiogram Head With & Without Contrast [[id number]] Updated:  02/14/19 1215    MRI Brain With & Without Contrast [[id number]] Collected:  02/13/19 1707     Updated:  02/14/19 0851    Narrative:       MRI OF THE BRAIN WITH AND WITHOUT CONTRAST 12/13/2019     CLINICAL HISTORY: Patient fell yesterday, posterior head contusion, has  left eyelid droop-ptosis.     TECHNIQUE: Axial T1, FLAIR, fat-suppressed T2, axial diffusion gradient  echo T2 sagittal T1 postcontrast axial fat suppressed T1 and coronal  T1-weighted images were obtained of the entire head.     This is correlated to yesterday's noncontrast head CT 02/12/2019 at 4:53  PM.     FINDINGS: There are prominent patchy nodular and confluent areas of T2  high signal in the periventricular and subcortical white matter cerebral  hemispheres and patchy T2 high signal throughout the central pontine  white matter all of which is most consistent with moderate small vessel  disease. 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  The remainder of the brain parenchyma is normal in signal intensity.  Specifically no diffusion-weighted abnormality is seen with no acute  infarct identified and on the gradient echo T2-weighted images no acute  or old blood breakdown  products are seen intracranially. The ventricles  are normal in size. I see no mass effect. No midline shift and no  extra-axial fluid collections are identified. No abnormal areas of  enhancement are seen in the head. The paranasal sinuses and mastoid air  cells and middle ear cavities are clear. Good flow voids are  demonstrated in the cerebral vessels and in the dural venous sinuses,  calvarium and skull base demonstrate normal marrow signal intensity. The  orbits are unremarkable.       Impression:       1. Moderate small vessel disease in cerebral and central pontine white  matter, 4 mm lacunar infarct in central to anterior aspect of the right  thalamus.  2. The remainder of MRI of the head is within normal limits. The  etiology of acute onset left eyelid droop-ptosis is not established on  this exam. Results were communicated to Keith Orr by telephone on  02/13/2019 at 4 PM.     This report was finalized on 2/14/2019 8:48 AM by Dr. Jesus Hawkins M.D.       XR Chest 1 View [[id number]] Collected:  02/13/19 1426     Updated:  02/14/19 0734    Narrative:       AP CHEST 02/13/2019     HISTORY: Weakness. Frequent falls. Headache.     FINDINGS: The heart size is at the upper limits of normal. There is mild  vascular congestion and interstitial prominence of the lungs. There is a  small ill-defined nodular density in the left midlung measuring  approximately 7 mm to 8mm in size.     There are no previous studies available for comparison.       Impression:       1. Mild interstitial prominence of the lungs, could represent chronic  interstitial fibrosis versus some mild interstitial edema.  2. Small nodular density in the left midlung.  3. Recommend correlation to any previous chest radiograph. If none are  available CT of the chest with contrast suggested.     This report was finalized on 2/14/2019 7:31 AM by Dr. Luis Angulo M.D.             Current Facility-Administered Medications:   •  ARIPiprazole  (ABILIFY) tablet 5 mg, 5 mg, Oral, Daily With Dinner, Volodymyr Aldridge MD, 5 mg at 02/13/19 1858  •  aspirin chewable tablet 81 mg, 81 mg, Oral, Daily, Keith Williamson MD, 81 mg at 02/14/19 0927  •  [COMPLETED] iopamidol (ISOVUE-300) 61 % injection 100 mL, 100 mL, Intravenous, Once in imaging, Keith Williamson MD, 95 mL at 02/14/19 1300  •  pantoprazole (PROTONIX) EC tablet 40 mg, 40 mg, Oral, Q AM, Keith Williamson MD, 40 mg at 02/14/19 0713  •  sodium chloride 0.9 % with KCl 20 mEq/L infusion, 75 mL/hr, Intravenous, Continuous, Keith Williamson MD, Last Rate: 75 mL/hr at 02/14/19 1135, 75 mL/hr at 02/14/19 1135     ASSESSMENT  Frequent falls  Left eye ptosis  Headache  Hypokalemia  Chronic anemia  Degenerative disc disease    PLAN  Discharge to subacute rehabilitation if okay with neurology  Discharge summary dictated    KEITH WILLIAMSON MD

## 2019-02-14 NOTE — PROGRESS NOTES
Discharge Planning Assessment  [facility]     Patient Name: Wendi Fofana  MRN: [id number]  Today's Date: 2/14/2019    Admit Date: 2/12/2019    Discharge Needs Assessment    No documentation.       Discharge Plan     Row Name 02/14/19 1303       Plan    Plan  [facility] for short term rehab    Patient/Family in Agreement with Plan  yes    Plan Comments  Pt with a discharge order.  Pt's family frined Elina Menezes ([phone]) notified of discharge, she ask that the nurese call her when pt is ready to be picked up.  Macy with [facility] notified of discharge. Discharge summary faxed to [facility].  Prachi Leach, JENSEN    Final Discharge Disposition Code  03 - skilled nursing facility (SNF)    Row Name 02/14/19 1221       Plan    Plan  [facility] for short term rehab    Plan Comments  I rounded with attending, he said he plans discharge today but wants to speak with Dr Aldridge first. Pt today is agreeable to CTA and is in radiology at this time.  I returned pt's friend Elina Menezes's voice mail ([phone]) due to she was wanting a discharge time, she said she will not be able to transport after 4:30PM but pt's dtr is working on getting someone else if discharged later in the day.  Voice mail received from pt's dtr Anahi wanting it added to pt's chart that pt is Confucianist , nurse updated. .   Prachi Leach RN        Destination      Service Provider Request Status Selected Services Address Phone Number Fax Number    [facility] [address] [place] [facility] [address] [phone]       Katherine Trujillo, RN 2/12/2019 3273    Spoke with Macy, per pt and family request. Pt will need precertification for placement. PT eval/approval pending.   Katherine Trujillo RN                       Expected Discharge Date and Time     Expected Discharge Date Expected Discharge Time    Feb 14, 2019        Prachi Leach, JENSEN

## 2019-02-14 NOTE — PROGRESS NOTES
Patient Identification:  NAME:  Wendi Fofana  Age:  85 y.o.   Sex:  female   :  1933   MRN:  [id number]       Chief complaint: Left ptosis    History of present illness: The left ptosis is better but still present MRI of the brain showed an old lacunar infarct only nothing new CTA has been ordered with results pending I have also drawn a myasthenia gravis panel and set up an outpatient neurology appointment      Past medical history:  History reviewed. No pertinent past medical history.    Allergies:  Penicillins    Home medications:  No medications prior to admission.        Hospital medications:    ARIPiprazole 5 mg Oral Daily With Dinner   aspirin 81 mg Oral Daily   pantoprazole 40 mg Oral Q AM              Objective:  Vitals Ranges:   Temp:  [97 °F (36.1 °C)-98.8 °F (37.1 °C)] 98.8 °F (37.1 °C)  Heart Rate:  [63-83] 63  Resp:  [16] 16  BP: (129-178)/(75-92) 141/83      Physical Exam:  Awake alert oriented x2 left ptosis is improved but still present extraocular movements are absolutely full and without nystagmus reflexes trace throughout symmetrical toes downgoing bilaterally    Results review:   I reviewed the patient's new clinical results.    Data review:  Lab Results (last 24 hours)     Procedure Component Value Units Date/Time    CBC & Differential [[id number]] Collected:  19    Specimen:  Blood Updated:  19    Narrative:       The following orders were created for panel order CBC & Differential.  Procedure                               Abnormality         Status                     ---------                               -----------         ------                     CBC Auto Differential[[id number]]        Abnormal            Final result                 Please view results for these tests on the individual orders.    CBC Auto Differential [[id number]]  (Abnormal) Collected:  19    Specimen:  Blood Updated:  19     WBC 3.49 10*3/mm3      RBC 3.52 10*6/mm3       Hemoglobin 10.7 g/dL      Hematocrit 34.6 %      MCV 98.3 fL      MCH 30.4 pg      MCHC 30.9 g/dL      RDW 14.5 %      RDW-SD 52.7 fl      MPV 9.5 fL      Platelets 155 10*3/mm3      Neutrophil % 51.3 %      Lymphocyte % 31.2 %      Monocyte % 11.7 %      Eosinophil % 4.3 %      Basophil % 0.9 %      Immature Grans % 0.6 %      Neutrophils, Absolute 1.79 10*3/mm3      Lymphocytes, Absolute 1.09 10*3/mm3      Monocytes, Absolute 0.41 10*3/mm3      Eosinophils, Absolute 0.15 10*3/mm3      Basophils, Absolute 0.03 10*3/mm3      Immature Grans, Absolute 0.02 10*3/mm3      nRBC 0.0 /100 WBC     BNP [[id number]]  (Normal) Collected:  02/14/19 0629    Specimen:  Blood Updated:  02/14/19 0745     proBNP 257.4 pg/mL     Narrative:       Among patients with dyspnea, NT-proBNP is highly sensitive for the detection of acute congestive heart failure. In addition NT-proBNP of <300 pg/ml effectively rules out acute congestive heart failure with 99% negative predictive value.    TSH [[id number]]  (Normal) Collected:  02/14/19 0629    Specimen:  Blood Updated:  02/14/19 0745     TSH 2.580 mIU/mL     Comprehensive Metabolic Panel [[id number]]  (Abnormal) Collected:  02/14/19 0629    Specimen:  Blood Updated:  02/14/19 0729     Glucose 96 mg/dL      BUN 6 mg/dL      Creatinine 0.64 mg/dL      Sodium 139 mmol/L      Potassium 4.1 mmol/L      Chloride 105 mmol/L      CO2 25.5 mmol/L      Calcium 8.3 mg/dL      Total Protein 6.3 g/dL      Albumin 3.30 g/dL      ALT (SGPT) 16 U/L      AST (SGOT) 20 U/L      Alkaline Phosphatase 45 U/L      Total Bilirubin 0.4 mg/dL      eGFR Non African Amer 88 mL/min/1.73      Globulin 3.0 gm/dL      A/G Ratio 1.1 g/dL      BUN/Creatinine Ratio 9.4     Anion Gap 8.5 mmol/L     Narrative:       The MDRD GFR formula is only valid for adults with stable renal function between ages 18 and 70.    Lipid Panel [[id number]]  (Abnormal) Collected:  02/14/19 0629    Specimen:  Blood Updated:  02/14/19 0728      Total Cholesterol 148 mg/dL      Triglycerides 50 mg/dL      HDL Cholesterol 75 mg/dL      LDL Cholesterol  63 mg/dL      VLDL Cholesterol 10 mg/dL      LDL/HDL Ratio 0.84    Narrative:       Cholesterol Reference Ranges  (U.S. Department of Health and Human Services ATP III Classifications)    Desirable          <200 mg/dL  Borderline High    200-239 mg/dL  High Risk          >240 mg/dL      Triglyceride Reference Ranges  (U.S. Department of Health and Human Services ATP III Classifications)    Normal           <150 mg/dL  Borderline High  150-199 mg/dL  High             200-499 mg/dL  Very High        >500 mg/dL    HDL Reference Ranges  (U.S. Department of Health and Human Services ATP III Classifcations)    Low     <40 mg/dl (major risk factor for CHD)  High    >60 mg/dl ('negative' risk factor for CHD)        LDL Reference Ranges  (U.S. Department of Health and Human Services ATP III Classifcations)    Optimal          <100 mg/dL  Near Optimal     100-129 mg/dL  Borderline High  130-159 mg/dL  High             160-189 mg/dL  Very High        >189 mg/dL    Hemoglobin A1c [[id number]]  (Normal) Collected:  02/14/19 0629    Specimen:  Blood Updated:  02/14/19 0712     Hemoglobin A1C 5.42 %     Narrative:       Hemoglobin A1C Ranges:    Increased Risk for Diabetes  5.7% to 6.4%  Diabetes                     >= 6.5%  Diabetic Goal                < 7.0%           Imaging:  Imaging Results (last 24 hours)     Procedure Component Value Units Date/Time    CT Angiogram Head With & Without Contrast [[id number]] Updated:  02/14/19 1215    MRI Brain With & Without Contrast [[id number]] Collected:  02/13/19 1707     Updated:  02/14/19 0851    Narrative:       MRI OF THE BRAIN WITH AND WITHOUT CONTRAST 12/13/2019     CLINICAL HISTORY: Patient fell yesterday, posterior head contusion, has  left eyelid droop-ptosis.     TECHNIQUE: Axial T1, FLAIR, fat-suppressed T2, axial diffusion gradient  echo T2 sagittal T1 postcontrast axial  fat suppressed T1 and coronal  T1-weighted images were obtained of the entire head.     This is correlated to yesterday's noncontrast head CT 02/12/2019 at 4:53  PM.     FINDINGS: There are prominent patchy nodular and confluent areas of T2  high signal in the periventricular and subcortical white matter cerebral  hemispheres and patchy T2 high signal throughout the central pontine  white matter all of which is most consistent with moderate small vessel  disease. 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  The remainder of the brain parenchyma is normal in signal intensity.  Specifically no diffusion-weighted abnormality is seen with no acute  infarct identified and on the gradient echo T2-weighted images no acute  or old blood breakdown products are seen intracranially. The ventricles  are normal in size. I see no mass effect. No midline shift and no  extra-axial fluid collections are identified. No abnormal areas of  enhancement are seen in the head. The paranasal sinuses and mastoid air  cells and middle ear cavities are clear. Good flow voids are  demonstrated in the cerebral vessels and in the dural venous sinuses,  calvarium and skull base demonstrate normal marrow signal intensity. The  orbits are unremarkable.       Impression:       1. Moderate small vessel disease in cerebral and central pontine white  matter, 4 mm lacunar infarct in central to anterior aspect of the right  thalamus.  2. The remainder of MRI of the head is within normal limits. The  etiology of acute onset left eyelid droop-ptosis is not established on  this exam. Results were communicated to Keith Orr by telephone on  02/13/2019 at 4 PM.     This report was finalized on 2/14/2019 8:48 AM by Dr. Jesus Hawkins M.D.       XR Chest 1 View [[id number]] Collected:  02/13/19 1426     Updated:  02/14/19 0734    Narrative:       AP CHEST 02/13/2019     HISTORY: Weakness. Frequent falls. Headache.     FINDINGS: The heart size is  at the upper limits of normal. There is mild  vascular congestion and interstitial prominence of the lungs. There is a  small ill-defined nodular density in the left midlung measuring  approximately 7 mm to 8mm in size.     There are no previous studies available for comparison.       Impression:       1. Mild interstitial prominence of the lungs, could represent chronic  interstitial fibrosis versus some mild interstitial edema.  2. Small nodular density in the left midlung.  3. Recommend correlation to any previous chest radiograph. If none are  available CT of the chest with contrast suggested.     This report was finalized on 2/14/2019 7:31 AM by Dr. Luis Angulo M.D.                Assessment and Plan:     The left ptosis is better but still present some MRI of the brain normal I have ordered a CTA and if that is normal she can be discharged I have drawn a myasthenia gravis panel and have recommended an outpatient neurology follow-up which I have made I am okay with her discharge after they called a CTA.  PS I was called the CTA is completely normal we are checking a myasthenia gravis panel.    Volodymyr Aldridge MD  02/14/19  4:01 PM

## 2019-02-14 NOTE — THERAPY TREATMENT NOTE
Acute Care - Physical Therapy Treatment Note  [facility]     Patient Name: Wendi Fofana  : 1933  MRN: [id number]  Today's Date: 2019             Admit Date: 2019    Visit Dx:    ICD-10-CM ICD-9-CM   1. Weakness R53.1 780.79   2. Immobility Z74.09 799.89     Patient Active Problem List   Diagnosis   • Weakness       Therapy Treatment    Rehabilitation Treatment Summary     Row Name 19 1100             Treatment Time/Intention    Discipline  physical therapy assistant  -CW      Document Type  therapy note (daily note)  -CW      Subjective Information  complains of;weakness;fatigue  -CW      Mode of Treatment  physical therapy  -CW      Therapy Frequency (PT Clinical Impression)  daily  -CW      Patient Effort  adequate  -CW      Existing Precautions/Restrictions  fall  -CW      Recorded by [CW] Daniel Stallworth, PTA 19 1110      Row Name 19 1100             Vital Signs    O2 Delivery Pre Treatment  room air  -CW      Recorded by [CW] Daniel Stallworth, PTA 19 1110      Row Name 19 1100             Cognitive Assessment/Intervention- PT/OT    Orientation Status (Cognition)  oriented x 3  -CW      Follows Commands (Cognition)  WNL  -CW      Personal Safety Interventions  fall prevention program maintained;gait belt;muscle strengthening facilitated;nonskid shoes/slippers when out of bed  -CW      Recorded by [CW] Daniel Stallworth, PTA 19 1110      Row Name 19 1100             Bed Mobility Assessment/Treatment    Bed Mobility Assessment/Treatment  supine-sit;sit-supine  -CW      Supine-Sit [place] (Bed Mobility)  contact guard  -CW      Sit-Supine [place] (Bed Mobility)  not tested  -CW      Assistive Device (Bed Mobility)  bed rails;head of bed elevated  -CW      Comment (Bed Mobility)  up to chair  -CW      Recorded by [CW] Daniel Stallworth, PTA 19 1110      Row Name 19 1100             Transfer Assessment/Treatment    Transfer  Assessment/Treatment  sit-stand transfer;stand-sit transfer  -CW      Recorded by [CW] Daniel Stallworth, PTA 02/14/19 1110      Row Name 02/14/19 1100             Sit-Stand Transfer    Sit-Stand [place] (Transfers)  contact guard  -CW      Assistive Device (Sit-Stand Transfers)  walker, front-wheeled  -CW      Recorded by [CW] Daniel Stallworth, PTA 02/14/19 1110      Row Name 02/14/19 1100             Stand-Sit Transfer    Stand-Sit [place] (Transfers)  contact guard  -CW      Assistive Device (Stand-Sit Transfers)  walker, front-wheeled  -CW      Recorded by [CW] Daniel Stallworth, PTA 02/14/19 1110      Row Name 02/14/19 1100             Gait/Stairs Assessment/Training    [place] Level (Gait)  minimum assist (75% patient effort);contact guard  -CW      Assistive Device (Gait)  walker, front-wheeled  -CW      Distance in Feet (Gait)  30  -CW      Pattern (Gait)  step-to  -CW      Deviations/Abnormal Patterns (Gait)  stride length decreased;base of support, narrow;margarette decreased  -CW      Bilateral Gait Deviations  forward flexed posture;heel strike decreased  -CW      Recorded by [CW] Daniel Stallworth, PTA 02/14/19 1110      Row Name 02/14/19 1100             Positioning and Restraints    Pre-Treatment Position  in bed  -CW      Post Treatment Position  chair  -CW      In Chair  notified nsg;reclined;call light within reach;encouraged to call for assist;exit alarm on;with nsg  -CW      Recorded by [CW] Daniel Stallworth, PTA 02/14/19 1110      Row Name 02/14/19 1100             Outcome Summary/Treatment Plan (PT)    Anticipated Discharge Disposition (PT)  skilled nursing facility  -CW      Recorded by [CW] Daniel Stallworth, PTA 02/14/19 1110        User Key  (r) = Recorded By, (t) = Taken By, (c) = Cosigned By    Initials Name Effective Dates Discipline    CW Daniel Stallworth, PTA 03/07/18 -  PT                   Physical Therapy Education     Title: PT OT SLP Therapies (Done)      Topic: Physical Therapy (Done)     Point: Mobility training (Done)     Learning Progress Summary           Patient Acceptance, TB,E, VU,DU by CW at 2/14/2019 11:11 AM    Acceptance, E,TB, VU by MT at 2/14/2019  4:44 AM    Acceptance, E,TB, VU by KIAN at 2/13/2019  4:54 PM    Acceptance, TB,E, NR by CLEMENTE at 2/13/2019  1:50 PM    Acceptance, D,E, VU,NR by AMIE at 2/12/2019  4:53 PM                   Point: Home exercise program (Done)     Learning Progress Summary           Patient Acceptance, TB,E, VU,DU by CW at 2/14/2019 11:11 AM    Acceptance, E,TB, VU by MT at 2/14/2019  4:44 AM    Acceptance, E,TB, VU by KIAN at 2/13/2019  4:54 PM    Acceptance, TB,E, NR by CLEMENTE at 2/13/2019  1:50 PM    Acceptance, D,E, VU,NR by AMIE at 2/12/2019  4:53 PM                   Point: Body mechanics (Done)     Learning Progress Summary           Patient Acceptance, TB,E, VU,DU by CW at 2/14/2019 11:11 AM    Acceptance, E,TB, VU by MT at 2/14/2019  4:44 AM    Acceptance, E,TB, VU by KIAN at 2/13/2019  4:54 PM    Acceptance, TB,E, NR by CLEMENTE at 2/13/2019  1:50 PM    Acceptance, D,E, VU,NR by AMIE at 2/12/2019  4:53 PM                   Point: Precautions (Done)     Learning Progress Summary           Patient Acceptance, TB,E, VU,DU by CLEMENTE at 2/14/2019 11:11 AM    Acceptance, E,TB, VU by MT at 2/14/2019  4:44 AM    Acceptance, E,TB, VU by KIAN at 2/13/2019  4:54 PM    Acceptance, TB,E, NR by CLEMENTE at 2/13/2019  1:50 PM    Acceptance, D,E, VU,NR by AMIE at 2/12/2019  4:53 PM                               User Key     Initials Effective Dates Name Provider Type Discipline     02/05/19 -  Marianna Alcala, PT Physical Therapist PT    MT 06/16/16 -  Luci Bowles, RN Registered Nurse Nurse    KIAN 02/13/19 -  Jessie Sutton, RN Registered Nurse Nurse     03/07/18 -  Daniel Stallworth PTA Physical Therapy Assistant PT                PT Recommendation and Plan  Anticipated Discharge Disposition (PT): skilled nursing facility  Therapy Frequency (PT  Clinical Impression): daily  Outcome Summary/Treatment Plan (PT)  Anticipated Discharge Disposition (PT): skilled nursing facility  Plan of Care Reviewed With: patient  Progress: improving  Outcome Summary: Pt increased with bed mobility and transfer safety to RWX and improved with gait safety and I with RWX  Outcome Measures     Row Name 02/14/19 1100 02/13/19 1300 02/13/19 1157       How much help from another person do you currently need...    Turning from your back to your side while in flat bed without using bedrails?  3  -CW  3  -CW  --    Moving from lying on back to sitting on the side of a flat bed without bedrails?  3  -CW  3  -CW  --    Moving to and from a bed to a chair (including a wheelchair)?  3  -CW  3  -CW  --    Standing up from a chair using your arms (e.g., wheelchair, bedside chair)?  3  -CW  3  -CW  --    Climbing 3-5 steps with a railing?  1  -CW  1  -CW  --    To walk in hospital room?  3  -CW  2  -CW  --    AM-PAC 6 Clicks Score  16  -CW  15  -CW  --       How much help from another is currently needed...    Putting on and taking off regular lower body clothing?  --  --  3  -SG    Bathing (including washing, rinsing, and drying)  --  --  3  -SG    Toileting (which includes using toilet bed pan or urinal)  --  --  3  -SG    Putting on and taking off regular upper body clothing  --  --  3  -SG    Taking care of personal grooming (such as brushing teeth)  --  --  3  -SG    Eating meals  --  --  4  -SG    Score  --  --  19  -SG       Functional Assessment    Outcome Measure Options  AM-PAC 6 Clicks Basic Mobility (PT)  -CW  AM-PAC 6 Clicks Basic Mobility (PT)  -CW  AM-PAC 6 Clicks Daily Activity (OT)  -SG    Row Name 02/12/19 1600             How much help from another person do you currently need...    Turning from your back to your side while in flat bed without using bedrails?  3  -KH      Moving from lying on back to sitting on the side of a flat bed without bedrails?  3  -KH      Moving to  and from a bed to a chair (including a wheelchair)?  2  -KH      Standing up from a chair using your arms (e.g., wheelchair, bedside chair)?  3  -KH      Climbing 3-5 steps with a railing?  1  -KH      To walk in hospital room?  2  -KH      AM-PAC 6 Clicks Score  14  -KH         Functional Assessment    Outcome Measure Options  AM-PAC 6 Clicks Basic Mobility (PT)  -        User Key  (r) = Recorded By, (t) = Taken By, (c) = Cosigned By    Initials Name Provider Type    Mraianna Segal, PT Physical Therapist    Laura Jacques, OTR Occupational Therapist    Daniel Sidhu PTA Physical Therapy Assistant         Time Calculation:   PT Charges     Row Name 02/14/19 1112             Time Calculation    Start Time  1045  -CW      Stop Time  1112  -CW      Time Calculation (min)  27 min  -CW      PT Received On  02/14/19  -CW      PT - Next Appointment  02/15/19  -CW        User Key  (r) = Recorded By, (t) = Taken By, (c) = Cosigned By    Initials Name Provider Type    Daniel Sidhu PTA Physical Therapy Assistant        Therapy Suggested Charges     Code   Minutes Charges    None           Therapy Charges for Today     Code Description Service Date Service Provider Modifiers Qty    37236684415 HC PT THER PROC EA 15 MIN 2/13/2019 Daniel Stallworth PTA GP 2    11801958229 HC PT THER SUPP EA 15 MIN 2/13/2019 Daniel Stallworth PTA GP 2    25178778117 HC PT THER PROC EA 15 MIN 2/14/2019 Daniel Stallworth PTA GP 2          PT G-Codes  Outcome Measure Options: AM-PAC 6 Clicks Basic Mobility (PT)  AM-PAC 6 Clicks Score: 16  Score: 19    Daniel Stallworth PTA  2/14/2019

## 2019-02-14 NOTE — PLAN OF CARE
Problem: Patient Care Overview  Goal: Plan of Care Review  Outcome: Ongoing (interventions implemented as appropriate)   02/13/19 1952   OTHER   Outcome Summary Pt receiving OT/PT. Lt eye ptosis- MRI brain neg, CTA head ordered. Has hemmorhoids and apparent vaginal prolapse. Accumax pump placed. Neuro checks q4hr.   Coping/Psychosocial   Plan of Care Reviewed With patient   Plan of Care Review   Progress improving     Goal: Individualization and Mutuality  Outcome: Ongoing (interventions implemented as appropriate)    Goal: Discharge Needs Assessment  Outcome: Ongoing (interventions implemented as appropriate)    Goal: Interprofessional Rounds/Family Conf  Outcome: Ongoing (interventions implemented as appropriate)      Problem: Fall Risk (Adult)  Goal: Identify Related Risk Factors and Signs and Symptoms  Outcome: Ongoing (interventions implemented as appropriate)    Goal: Absence of Fall  Outcome: Ongoing (interventions implemented as appropriate)      Problem: Skin Injury Risk (Adult)  Goal: Identify Related Risk Factors and Signs and Symptoms  Outcome: Ongoing (interventions implemented as appropriate)    Goal: Skin Health and Integrity  Outcome: Ongoing (interventions implemented as appropriate)

## 2019-02-14 NOTE — PLAN OF CARE
Problem: Patient Care Overview  Goal: Plan of Care Review  Outcome: Ongoing (interventions implemented as appropriate)   02/14/19 1111   OTHER   Outcome Summary Pt increased with bed mobility and transfer safety to RWX and improved with gait safety and I with RWX   Coping/Psychosocial   Plan of Care Reviewed With patient   Plan of Care Review   Progress improving

## 2019-02-14 NOTE — NURSING NOTE
Spoke with pt and explained need to do ct head w/ contrast as per Dr. Aldridge ordered, and need to have new iv 20g to do the test, pt refused to do the ct head tonight and strongly refused to have new iv insertion.

## 2019-02-14 NOTE — DISCHARGE SUMMARY
Discharge summary    Date of admission 2/12/2019  Date of discharge 2/14/2019    Final diagnosis  Frequent falls  Old lacunar infarct right thalamus  Left eye ptosis  Headache  Hypokalemia  Chronic anemia  Degenerative disc disease    Discharge medications    Current Facility-Administered Medications:   •  ARIPiprazole (ABILIFY) tablet 5 mg, 5 mg, Oral, Daily With Dinner, Volodymyr Aldridge MD, 5 mg at 02/13/19 1858  •  aspirin chewable tablet 81 mg, 81 mg, Oral, Daily, Keith Williamson MD, 81 mg at 02/14/19 0927  •  [COMPLETED] iopamidol (ISOVUE-300) 61 % injection 100 mL, 100 mL, Intravenous, Once in imaging, Keith Williamson MD, 95 mL at 02/14/19 1300  •  pantoprazole (PROTONIX) EC tablet 40 mg, 40 mg, Oral, Q AM, Keith Williamson MD, 40 mg at 02/14/19 0713    Consult obtained  Neurology    Procedures  None    Hospital course  85-year-old white female with no medical problem no home medication admitted through emergency room with recurrent falls left eye ptosis and headache and generalized weakness.  Patient lives by herself admitted for management.  Patient admitted and her workup with MRI of the brain showed old lacunar infarct right thalamus but no acute infarct.  Patient is getting CTA of head and remain on aspirin Protonix and abilify started small dose by neurology.  Patient's symptoms improved but still weak and will be discharged to subacute rehabilitation if okay with neurology after looking at CTA head.  Patient hemoglobin 10.7 and chemistries all within normal limits LDL is 63.  Patient working with PT OT and no more falls in the hospital.  I will do the addendum discharge summary if CTA head is abnormal    Discharge diet regular    Activity per PT OT    Medications as above    Further care per accepting physician at nursing home    KEITH WILLIAMSON MD

## 2019-02-14 NOTE — PROGRESS NOTES
Discharge Planning Assessment  [facility]     Patient Name: Wendi Fofana  MRN: [id number]  Today's Date: 2/14/2019    Admit Date: 2/12/2019    Discharge Needs Assessment    No documentation.       Discharge Plan     Row Name 02/14/19 1221       Plan    Plan  [facility] for short term rehab    Plan Comments  I rounded with attending, he said he plans discharge today but wants to speak with Dr Aldridge first. Pt today is agreeable to CTA and is in radiology at this time.  I returned pt's friend Elina Menezes's voice mail ([phone]) due to she was wanting a discharge time, she said she will not be able to transport after 4:30PM but pt's dtr is working on getting someone else if discharged later in the day.  Voice mail received from pt's dtr Anahi wanting it added to pt's chart that pt is [facility] , nurse updated.   Macy with [facility] updated that attending plans discharge today.   Prachi Leach RN        Destination      Service Provider Request Status Selected Services Address Phone Number Fax Number    [facility] [address] [facility] [address] [phone]       Katherine Trujillo, RN 2/12/2019 8900    Spoke with Macy, per pt and family request. Pt will need precertification for placement. PT eval/approval pending.   Katherine Trujillo, RN                        Prachi Leach, JENSEN

## 2019-02-15 NOTE — PROGRESS NOTES
Discharge Planning Assessment  [facility]     Patient Name: Wendi Fofnaa  MRN: [id number]  Today's Date: 2/15/2019    Admit Date: 2/12/2019    Discharge Needs Assessment    No documentation.       Discharge Plan     Row Name 02/15/19 0952       Plan    Plan Comments  Call received from pt's dtr Anahi Seaforth wanting x-ray results, I advised they can be obtained from HIM with signed release or she can call Dr. Aldridge's office who ordered the x-ray.  I advised I was given a note that pt's dtr Ellen called and c/o decision to send her mother to [facility], she wanted to speak with me, I advised there was no return phone number provided to me.  Anahi said Ellen can be difficult to deal with. Prachi Leach, JENSEN        Destination - Selection Complete      Service Provider Request Status Selected Services Address Phone Number Fax Number    [facility] [address] [address] [phone]       Katherine Trujillo RN 2/12/2019 1550    Spoke with Macy, per pt and family request. Pt will need precertification for placement. PT eval/approval pending.   JENSEN Rodgers, RN

## 2019-02-22 LAB
ACHR AB SER-SCNC: 4.39 NMOL/L (ref 0–0.24)
ACHR BLOCK AB/ACHR TOTAL SFR SER: 53 % (ref 0–25)
ACHR MOD AB/ACHR TOTAL SFR SER: 33 % (ref 0–20)
STRIA MUS AB TITR SER IF: ABNORMAL {TITER}

## 2019-03-04 ENCOUNTER — OFFICE VISIT (OUTPATIENT)
Dept: FAMILY MEDICINE CLINIC | Facility: CLINIC | Age: 84
End: 2019-03-04

## 2019-03-04 VITALS
DIASTOLIC BLOOD PRESSURE: 78 MMHG | HEART RATE: 85 BPM | RESPIRATION RATE: 16 BRPM | BODY MASS INDEX: 22.09 KG/M2 | SYSTOLIC BLOOD PRESSURE: 128 MMHG | OXYGEN SATURATION: 97 % | WEIGHT: 117 LBS | HEIGHT: 61 IN

## 2019-03-04 DIAGNOSIS — Z86.73 OLD LACUNAR STROKE WITHOUT LATE EFFECT: ICD-10-CM

## 2019-03-04 DIAGNOSIS — H02.402 PTOSIS OF LEFT EYELID: ICD-10-CM

## 2019-03-04 DIAGNOSIS — R26.89 BALANCE PROBLEM: ICD-10-CM

## 2019-03-04 DIAGNOSIS — Z09 HOSPITAL DISCHARGE FOLLOW-UP: Primary | ICD-10-CM

## 2019-03-04 PROCEDURE — 99203 OFFICE O/P NEW LOW 30 MIN: CPT | Performed by: NURSE PRACTITIONER

## 2019-03-04 NOTE — PROGRESS NOTES
Wendi Fofana is a 85 y.o. female. Pt is a new pt for the clinic She fall 2/11/19. Went to hospital, and no fractures or anything. She it's been on Physical therapy since then, and would like for a PCP to keep her on PT. Pt is at the practice with her daughter Anahi.  Pt is a [employer]  , and  daughter states she don't want her mother to take any other medicine than Antibiotics or pain medication as needed.     PMHX: None  PSHX: none  Pt was in the hospital and being followed by neurology for some neurologic problems that took her to the emergency room.Appears that she had an old lacunar infarct but had nothing new.   Pt had a 2 week stay at [facility] where she had daily PT and has progressed but it is slow.      Assessment/Plan   Problem List Items Addressed This Visit     None      Visit Diagnoses     Hospital discharge follow-up    -  Primary    Relevant Orders    Ambulatory Referral to Physical Therapy    Ambulatory Referral to Neurology    Old lacunar stroke without late effect        Ptosis of left eyelid        Balance problem             records of hospital stay and imaging and labs reviewed, is to follow up with neurology will call her daughter to make sure she gets to her appointment    Physical therapy at home ordered by Dr Louis    No Follow-up on file.  There are no Patient Instructions on file for this visit.    Chief Complaint   Patient presents with   • Fall     f/u. New pt.      Social History     Tobacco Use   • Smoking status: Never Smoker   Substance Use Topics   • Alcohol use: No     Frequency: Never   • Drug use: Not on file       History of Present Illness     The following portions of the patient's history were reviewed and updated as appropriate:PMHroutine: Social history , Past Medical History, Allergies, Current Medications, Active Problem List and Health Maintenance    Review of Systems   Constitutional: Positive for fatigue. Negative for activity change.   Respiratory: Positive  "for shortness of breath.         Breathless with exertion    Gastrointestinal: Positive for constipation.   Musculoskeletal:        Joint pain   Neurological: Positive for weakness. Negative for dizziness and headaches.       Objective   Vitals:    03/04/19 1445   BP: 128/78   Pulse: 85   Resp: 16   SpO2: 97%   Weight: 53.1 kg (117 lb)   Height: 154.9 cm (61\")     Body mass index is 22.11 kg/m².  Physical Exam   Constitutional: She is oriented to person, place, and time. She appears well-developed and well-nourished. No distress.   HENT:   Head: Normocephalic and atraumatic.   Right Ear: External ear normal.   Left Ear: External ear normal.   Mouth/Throat: Oropharynx is clear and moist.   Eyes: Conjunctivae and EOM are normal. Pupils are equal, round, and reactive to light.   Ptosis left upper lid   Neck: Neck supple.   Cardiovascular: Normal rate and regular rhythm.   Pulmonary/Chest: Effort normal and breath sounds normal.   Neurological: She is alert and oriented to person, place, and time. No cranial nerve deficit. She exhibits normal muscle tone.   Wide based gait, very slow but ambulatory with walker   Skin: Skin is warm.   Psychiatric: She has a normal mood and affect.   Nursing note and vitals reviewed.    Reviewed Data:  Admission on 02/12/2019, Discharged on 02/14/2019   Component Date Value Ref Range Status   • Glucose 02/12/2019 92  65 - 99 mg/dL Final   • BUN 02/12/2019 17  8 - 23 mg/dL Final   • Creatinine 02/12/2019 0.69  0.57 - 1.00 mg/dL Final   • Sodium 02/12/2019 141  136 - 145 mmol/L Final   • Potassium 02/12/2019 3.3* 3.5 - 5.2 mmol/L Final   • Chloride 02/12/2019 101  98 - 107 mmol/L Final   • CO2 02/12/2019 26.6  22.0 - 29.0 mmol/L Final   • Calcium 02/12/2019 8.9  8.6 - 10.5 mg/dL Final   • eGFR Non African Amer 02/12/2019 81  >60 mL/min/1.73 Final   • BUN/Creatinine Ratio 02/12/2019 24.6  7.0 - 25.0 Final   • Anion Gap 02/12/2019 13.4  mmol/L Final   • Color, UA 02/12/2019 Yellow  Yellow, " Straw Final   • Appearance, UA 02/12/2019 Clear  Clear Final   • pH, UA 02/12/2019 5.5  5.0 - 8.0 Final   • Specific Gravity, UA 02/12/2019 1.011  1.005 - 1.030 Final   • Glucose, UA 02/12/2019 Negative  Negative Final   • Ketones, UA 02/12/2019 Negative  Negative Final   • Bilirubin, UA 02/12/2019 Negative  Negative Final   • Blood, UA 02/12/2019 Negative  Negative Final   • Protein, UA 02/12/2019 Negative  Negative Final   • Leuk Esterase, UA 02/12/2019 Trace* Negative Final   • Nitrite, UA 02/12/2019 Negative  Negative Final   • Urobilinogen, UA 02/12/2019 0.2 E.U./dL  0.2 - 1.0 E.U./dL Final   • WBC 02/12/2019 4.99  3.40 - 10.80 10*3/mm3 Final   • RBC 02/12/2019 4.19  3.77 - 5.28 10*6/mm3 Final   • Hemoglobin 02/12/2019 12.6  12.0 - 15.9 g/dL Final   • Hematocrit 02/12/2019 39.3  34.0 - 46.6 % Final   • MCV 02/12/2019 93.8  79.0 - 97.0 fL Final   • MCH 02/12/2019 30.1  26.6 - 33.0 pg Final   • MCHC 02/12/2019 32.1  31.5 - 35.7 g/dL Final   • RDW 02/12/2019 14.2  12.3 - 15.4 % Final   • RDW-SD 02/12/2019 48.8  37.0 - 54.0 fl Final   • MPV 02/12/2019 9.4  6.0 - 12.0 fL Final   • Platelets 02/12/2019 183  140 - 450 10*3/mm3 Final   • Neutrophil % 02/12/2019 62.4  42.7 - 76.0 % Final   • Lymphocyte % 02/12/2019 23.8  19.6 - 45.3 % Final   • Monocyte % 02/12/2019 11.4  5.0 - 12.0 % Final   • Eosinophil % 02/12/2019 1.4  0.3 - 6.2 % Final   • Basophil % 02/12/2019 0.6  0.0 - 1.5 % Final   • Immature Grans % 02/12/2019 0.4  0.0 - 0.5 % Final   • Neutrophils, Absolute 02/12/2019 3.11  1.40 - 7.00 10*3/mm3 Final   • Lymphocytes, Absolute 02/12/2019 1.19  0.70 - 3.10 10*3/mm3 Final   • Monocytes, Absolute 02/12/2019 0.57  0.10 - 0.90 10*3/mm3 Final   • Eosinophils, Absolute 02/12/2019 0.07  0.00 - 0.40 10*3/mm3 Final   • Basophils, Absolute 02/12/2019 0.03  0.00 - 0.20 10*3/mm3 Final   • Immature Grans, Absolute 02/12/2019 0.02  0.00 - 0.05 10*3/mm3 Final   • nRBC 02/12/2019 0.0  0.0 - 0.0 /100 WBC Final   • RBC, UA  02/12/2019 0-2  None Seen, 0-2 /HPF Final   • WBC, UA 02/12/2019 0-2  None Seen, 0-2 /HPF Final   • Bacteria, UA 02/12/2019 None Seen  None Seen /HPF Final   • Squamous Epithelial Cells, UA 02/12/2019 0-2  None Seen, 0-2 /HPF Final   • Hyaline Casts, UA 02/12/2019 0-2  None Seen /LPF Final   • Methodology 02/12/2019 Automated Microscopy   Final   • Glucose 02/14/2019 96  65 - 99 mg/dL Final   • BUN 02/14/2019 6* 8 - 23 mg/dL Final   • Creatinine 02/14/2019 0.64  0.57 - 1.00 mg/dL Final   • Sodium 02/14/2019 139  136 - 145 mmol/L Final   • Potassium 02/14/2019 4.1  3.5 - 5.2 mmol/L Final   • Chloride 02/14/2019 105  98 - 107 mmol/L Final   • CO2 02/14/2019 25.5  22.0 - 29.0 mmol/L Final   • Calcium 02/14/2019 8.3* 8.6 - 10.5 mg/dL Final   • Total Protein 02/14/2019 6.3  6.0 - 8.5 g/dL Final   • Albumin 02/14/2019 3.30* 3.50 - 5.20 g/dL Final   • ALT (SGPT) 02/14/2019 16  1 - 33 U/L Final   • AST (SGOT) 02/14/2019 20  1 - 32 U/L Final   • Alkaline Phosphatase 02/14/2019 45  39 - 117 U/L Final   • Total Bilirubin 02/14/2019 0.4  0.1 - 1.2 mg/dL Final   • eGFR Non African Amer 02/14/2019 88  >60 mL/min/1.73 Final   • Globulin 02/14/2019 3.0  gm/dL Final   • A/G Ratio 02/14/2019 1.1  g/dL Final   • BUN/Creatinine Ratio 02/14/2019 9.4  7.0 - 25.0 Final   • Anion Gap 02/14/2019 8.5  mmol/L Final   • proBNP 02/14/2019 257.4  0.0-1,800.0 pg/mL Final   • TSH 02/14/2019 2.580  0.270 - 4.200 mIU/mL Final   • Total Cholesterol 02/14/2019 148  0 - 200 mg/dL Final   • Triglycerides 02/14/2019 50  0 - 150 mg/dL Final   • HDL Cholesterol 02/14/2019 75* 40 - 60 mg/dL Final   • LDL Cholesterol  02/14/2019 63  0 - 100 mg/dL Final   • VLDL Cholesterol 02/14/2019 10  5 - 40 mg/dL Final   • LDL/HDL Ratio 02/14/2019 0.84   Final   • Hemoglobin A1C 02/14/2019 5.42  4.80 - 5.60 % Final   • WBC 02/14/2019 3.49  3.40 - 10.80 10*3/mm3 Final   • RBC 02/14/2019 3.52* 3.77 - 5.28 10*6/mm3 Final   • Hemoglobin 02/14/2019 10.7* 12.0 - 15.9 g/dL  Final   • Hematocrit 02/14/2019 34.6  34.0 - 46.6 % Final   • MCV 02/14/2019 98.3* 79.0 - 97.0 fL Final   • MCH 02/14/2019 30.4  26.6 - 33.0 pg Final   • MCHC 02/14/2019 30.9* 31.5 - 35.7 g/dL Final   • RDW 02/14/2019 14.5  12.3 - 15.4 % Final   • RDW-SD 02/14/2019 52.7  37.0 - 54.0 fl Final   • MPV 02/14/2019 9.5  6.0 - 12.0 fL Final   • Platelets 02/14/2019 155  140 - 450 10*3/mm3 Final   • Neutrophil % 02/14/2019 51.3  42.7 - 76.0 % Final   • Lymphocyte % 02/14/2019 31.2  19.6 - 45.3 % Final   • Monocyte % 02/14/2019 11.7  5.0 - 12.0 % Final   • Eosinophil % 02/14/2019 4.3  0.3 - 6.2 % Final   • Basophil % 02/14/2019 0.9  0.0 - 1.5 % Final   • Immature Grans % 02/14/2019 0.6* 0.0 - 0.5 % Final   • Neutrophils, Absolute 02/14/2019 1.79  1.40 - 7.00 10*3/mm3 Final   • Lymphocytes, Absolute 02/14/2019 1.09  0.70 - 3.10 10*3/mm3 Final   • Monocytes, Absolute 02/14/2019 0.41  0.10 - 0.90 10*3/mm3 Final   • Eosinophils, Absolute 02/14/2019 0.15  0.00 - 0.40 10*3/mm3 Final   • Basophils, Absolute 02/14/2019 0.03  0.00 - 0.20 10*3/mm3 Final   • Immature Grans, Absolute 02/14/2019 0.02  0.00 - 0.05 10*3/mm3 Final   • nRBC 02/14/2019 0.0  0.0 - 0.0 /100 WBC Final   • AChR Binding Ab 02/14/2019 4.39* 0.00 - 0.24 nmol/L Final                                   Negative:   0.00 - 0.24                                 Borderline: 0.25 - 0.40                                 Positive:        > 0.40   • AChR Blocking Abs 02/14/2019 53* 0 - 25 % Final                                   Negative:      0 - 25                                 Borderline:   26 - 30                                 Positive:         >30  Results for this test are for research purposes  only by the assay's .  The performance  characteristics of this product have not been  established.  Results should not be used as a  diagnostic procedure without confirmation of the  diagnosis by another medically established  diagnostic product or procedure.   •  Acetylcholine Modulating Ab 02/14/2019 33* 0 - 20 % Final                                  Negative:          <21                                Equivocal:     21 - 25                                Positive:          >25   The assay is linear between values of 12 and 64.   Those <12 and >64 are reported as such.  No single   value for ACR-modulating antibody should be used as   a sole basis for diagnosis or response to therapy.   • Striated Muscle Antibody 02/14/2019 1:80* Neg:<1:40 Final

## 2019-03-05 DIAGNOSIS — R26.81 GAIT INSTABILITY: ICD-10-CM

## 2019-03-05 DIAGNOSIS — R53.1 WEAKNESS: Primary | ICD-10-CM

## 2019-03-07 ENCOUNTER — TELEPHONE (OUTPATIENT)
Dept: FAMILY MEDICINE CLINIC | Facility: CLINIC | Age: 84
End: 2019-03-07

## 2019-03-07 NOTE — TELEPHONE ENCOUNTER
Aye with [facility] nurse is requesting an order for a Medical Social worker. [phone]. Is this okay?

## 2019-03-14 ENCOUNTER — TELEPHONE (OUTPATIENT)
Dept: FAMILY MEDICINE CLINIC | Facility: CLINIC | Age: 84
End: 2019-03-14

## 2019-03-14 NOTE — TELEPHONE ENCOUNTER
Ms Fofana had some home health and needs some more according to the nurse would you be able to send in a new order.

## 2019-03-14 NOTE — TELEPHONE ENCOUNTER
Coco at [facility], productive cough that has turned to green, nasal congestion, no fever, [id number], more SOB than normal, lungs are clear. [phone]

## 2019-03-21 ENCOUNTER — OFFICE VISIT (OUTPATIENT)
Dept: NEUROLOGY | Facility: CLINIC | Age: 84
End: 2019-03-21

## 2019-03-21 VITALS
WEIGHT: 116 LBS | BODY MASS INDEX: 21.9 KG/M2 | DIASTOLIC BLOOD PRESSURE: 78 MMHG | HEIGHT: 61 IN | HEART RATE: 95 BPM | SYSTOLIC BLOOD PRESSURE: 120 MMHG | OXYGEN SATURATION: 95 %

## 2019-03-21 DIAGNOSIS — G70.00 MYASTHENIA GRAVIS WITHOUT EXACERBATION (HCC): Primary | ICD-10-CM

## 2019-03-21 PROCEDURE — 99215 OFFICE O/P EST HI 40 MIN: CPT | Performed by: PSYCHIATRY & NEUROLOGY

## 2019-03-21 RX ORDER — ASPIRIN 81 MG/1
81 TABLET ORAL DAILY
COMMUNITY

## 2019-03-21 RX ORDER — PYRIDOSTIGMINE BROMIDE 60 MG/1
60 TABLET ORAL 3 TIMES DAILY
Qty: 90 TABLET | Refills: 5 | Status: SHIPPED | OUTPATIENT
Start: 2019-03-21 | End: 2019-04-20

## 2019-03-21 NOTE — PROGRESS NOTES
Subjective:     Patient ID: Wendi Fofana is a 85 y.o. female.    Ms. Fofana is an 85 year old right handed female with a h/o arthritis who presents today for a hospital f/u.  Had a fall and couldn't get up or walk and left eye was worse.  This was 2/13/19.  First went to ER, was d/c'ed, but went back.  Was admitted for a day and was sent to rehab.  Couldn't move or walk.  No longer at rehab.  Daughters are staying with her.  No diplopia.  Has weakness in UE/LE.  Patient feels that arms are worse.  Weakness is symmetric.  No trouble swallowing.  Patient feels that the symptoms don't particular fluctuate.  Daughter disagrees.  Does have trouble breathing.  Left eye ptosis has been going on for months, worsened in the past 2-3 weeks.  Always left eye.  Never right eye.  Walking far makes her feel weaker.  Sitting down makes it better.  No associated numbness or pain.  I reviewed the patient's records.  I reviewed Dr. Aldridge's consult note from the hospital from 2/13/19.  He was asked to evaluate left eye ptosis and headache.  The patient was noted to be delirious and an MRI brain was recommended.   MRI brain showed an old infarct and CTA was normal.  MG panel was sent and positive.  I personally reviewed the patient's MRI brain images from 2/13/19.  The patient has scattered chronic ischemic white matter changes.  TSH from 2/14/19 was normal.        The following portions of the patient's history were reviewed and updated as appropriate: allergies, current medications, past family history, past medical history, past social history, past surgical history and problem list.    Review of Systems   Constitutional: Positive for activity change, appetite change and fatigue.   HENT: Positive for postnasal drip. Negative for sinus pressure and sinus pain.    Eyes: Negative for photophobia, redness and visual disturbance.   Respiratory: Positive for cough and shortness of breath. Negative for choking.    Cardiovascular: Negative  for chest pain, palpitations and leg swelling.   Gastrointestinal: Positive for constipation. Negative for diarrhea and nausea.   Endocrine: Negative for cold intolerance, heat intolerance and polydipsia.   Genitourinary: Negative for difficulty urinating, frequency and urgency.   Musculoskeletal: Positive for arthralgias and myalgias. Negative for back pain and gait problem.   Skin: Negative for color change, pallor, rash and wound.   Allergic/Immunologic: Negative for environmental allergies, food allergies and immunocompromised state.   Neurological: Positive for weakness. Negative for dizziness, tremors, seizures, syncope, facial asymmetry, speech difficulty, light-headedness, numbness and headaches.   Hematological: Negative for adenopathy. Does not bruise/bleed easily.   Psychiatric/Behavioral: Positive for confusion and decreased concentration. Negative for agitation, behavioral problems, dysphoric mood, hallucinations, self-injury, sleep disturbance and suicidal ideas. The patient is not nervous/anxious and is not hyperactive.     I reviewed the ROS documented by the MA.      Objective:    Neurologic Exam    Physical Exam     Constitutional:  Vital signs reviewed.  No apparent distress.  Well groomed.  Eyes:  No injection, no icterus.  Fundoscopic exam performed.  No papilledema appreciated bilaterally.   Respiratory:  Normal effort.  Clear to auscultation bilaterally.  Cardiovascular:  Regular rate and rhythm.  No murmurs.  No carotid bruits. Symmetric radial pulses.  Musculoskeletal: Patient utilizes walker for ambulation.   Muscle tone and bulk normal.  Strength is 4/5 in bilateral deltoids, triceps and hip flexors; otherwise, full strength.  Skin:  No rashes.  Warm, dry, and intact.  Psychiatric:  Good mood.  Normal affect.    Neurologic:  Mental status-  The patient is alert and oriented to person, place and time. Attention/concentration is within normal limits.  Speech is fluent without dysarthria.   The patient is able to name, repeat and follow complex commands without difficulty.  Immediate memory and delayed recall intact (3/3 words immediate and after 4 minutes).  Fund of knowledge normal.  Cranial nerves- Pupils equally round and reactive to light with intact accomodation.  Visual acuity and visual fields intact.  Extraocular movements intact.  Facial sensation intact.  Smile symmetric.  Hearing intact to finger-rub bilaterally.  Palate elevates symmetrically.  SCM and trapezius are 5/5 bilaterally.  Tongue is midline.  Motor-  See musculoskeletal above.  No tremor.  Reflexes- 2+ and brisk in the bilateral triceps, biceps, brachioradialis, patellar and achilles.  Toes down-going bilaterally.  Sensation- Intact to pinprick and vibration in bilateral upper and lower extremities symmetrically.  Coordination- Intact to finger to nose and heel knee shin bilaterally.  Intact rapid alternating movements bilaterally.  Gait- See musculoskeletal exam above.       Assessment/Plan:  Ms. Fofana is an 85 year old female with a h/o arthritis who presents today for f/u on a positive MG panel.    1.  AChR Ab (+) generalized MG-  We had an extensive conversation today regarding the diagnosis.  We discussed the pathophysiology of the illness, associated comorbidities, symptoms and plan of care.  Recommend CT of chest to evaluate for thymoma.  Will also check B12 level.  Will order EMG for repetitive NCS.  Will further discuss with Dr. Payne if he feels that it would be beneficial.  Will go ahead and start on mestinon 60 mg TID.  We discussed side effects including GI upset.  Will defer decision on immunosuppression to Dr. Payne.  I encouraged the patient and her daughter to visit [email or web address] for more information.  Also recommend for her to check with her neurologist before new medications are prescribed or taken as they can exacerbate the disease.  Finally we discussed that this disease can affect breathing and swallowing  and to seek immediate medical attention if she experiences any of these symptoms.       Problems Addressed this Visit     None      Visit Diagnoses     Myasthenia gravis without exacerbation (CMS/HCC)    -  Primary    Relevant Orders    CT chest w contrast    EMG Myasthenia Gravis Test    Vitamin B12

## 2019-05-02 ENCOUNTER — TELEPHONE (OUTPATIENT)
Dept: NEUROLOGY | Facility: CLINIC | Age: 84
End: 2019-05-02

## 2019-10-02 ENCOUNTER — APPOINTMENT (OUTPATIENT)
Dept: CT IMAGING | Facility: HOSPITAL | Age: 84
End: 2019-10-02

## 2019-10-02 ENCOUNTER — HOSPITAL ENCOUNTER (OUTPATIENT)
Facility: HOSPITAL | Age: 84
Setting detail: OBSERVATION
Discharge: NURSING, REHAB OR LONG-TERM CARE | End: 2019-10-07
Attending: EMERGENCY MEDICINE | Admitting: HOSPITALIST

## 2019-10-02 DIAGNOSIS — R13.19 OTHER DYSPHAGIA: ICD-10-CM

## 2019-10-02 DIAGNOSIS — Z60.2 LIVES ALONE WITHOUT HELP AVAILABLE: ICD-10-CM
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
## 2019-10-02 DIAGNOSIS — R41.3 MEMORY DIFFICULTIES: ICD-10-CM

## 2019-10-02 PROBLEM — R51.9 HEADACHE: Status: ACTIVE | Noted: 2019-10-02

## 2019-10-02 PROBLEM — F03.90 DEMENTIA: Chronic | Status: ACTIVE | Noted: 2019-10-02

## 2019-10-02 PROBLEM — G70.01 MYASTHENIA GRAVIS WITH EXACERBATION: Status: ACTIVE | Noted: 2019-10-02

## 2019-10-02 PROBLEM — G70.01 MYASTHENIA GRAVIS WITH (ACUTE) EXACERBATION (HCC): Status: ACTIVE | Noted: 2019-10-02

## 2019-10-02 LAB
ALBUMIN SERPL-MCNC: 4.6 G/DL (ref 3.5–5.2)
ALBUMIN/GLOB SERPL: 1.3 G/DL
ALP SERPL-CCNC: 57 U/L (ref 39–117)
ALT SERPL W P-5'-P-CCNC: 12 U/L (ref 1–33)
ANION GAP SERPL CALCULATED.3IONS-SCNC: 14.9 MMOL/L (ref 5–15)
AST SERPL-CCNC: 26 U/L (ref 1–32)
BACTERIA UR QL AUTO: ABNORMAL /HPF
BASOPHILS # BLD AUTO: 0.02 10*3/MM3 (ref 0–0.2)
BASOPHILS NFR BLD AUTO: 0.4 % (ref 0–1.5)
BILIRUB SERPL-MCNC: 0.8 MG/DL (ref 0.2–1.2)
BILIRUB UR QL STRIP: NEGATIVE
BUN BLD-MCNC: 9 MG/DL (ref 8–23)
BUN/CREAT SERPL: 12.5 (ref 7–25)
CALCIUM SPEC-SCNC: 9.5 MG/DL (ref 8.6–10.5)
CHLORIDE SERPL-SCNC: 101 MMOL/L (ref 98–107)
CK MB SERPL-CCNC: 3.47 NG/ML
CK SERPL-CCNC: 238 U/L (ref 20–180)
CLARITY UR: CLEAR
CO2 SERPL-SCNC: 28.1 MMOL/L (ref 22–29)
COLOR UR: ABNORMAL
CREAT BLD-MCNC: 0.72 MG/DL (ref 0.57–1)
DEPRECATED RDW RBC AUTO: 49.3 FL (ref 37–54)
EOSINOPHIL # BLD AUTO: 0 10*3/MM3 (ref 0–0.4)
EOSINOPHIL NFR BLD AUTO: 0 % (ref 0.3–6.2)
ERYTHROCYTE [DISTWIDTH] IN BLOOD BY AUTOMATED COUNT: 14.4 % (ref 12.3–15.4)
GFR SERPL CREATININE-BSD FRML MDRD: 77 ML/MIN/1.73
GLOBULIN UR ELPH-MCNC: 3.6 GM/DL
GLUCOSE BLD-MCNC: 119 MG/DL (ref 65–99)
GLUCOSE UR STRIP-MCNC: NEGATIVE MG/DL
HCT VFR BLD AUTO: 42 % (ref 34–46.6)
HGB BLD-MCNC: 13.9 G/DL (ref 12–15.9)
HGB UR QL STRIP.AUTO: NEGATIVE
HYALINE CASTS UR QL AUTO: ABNORMAL /LPF
IMM GRANULOCYTES # BLD AUTO: 0.02 10*3/MM3 (ref 0–0.05)
IMM GRANULOCYTES NFR BLD AUTO: 0.4 % (ref 0–0.5)
INR PPP: 1.17 (ref 0.9–1.1)
KETONES UR QL STRIP: ABNORMAL
LEUKOCYTE ESTERASE UR QL STRIP.AUTO: NEGATIVE
LYMPHOCYTES # BLD AUTO: 0.69 10*3/MM3 (ref 0.7–3.1)
LYMPHOCYTES NFR BLD AUTO: 13.7 % (ref 19.6–45.3)
MCH RBC QN AUTO: 30.6 PG (ref 26.6–33)
MCHC RBC AUTO-ENTMCNC: 33.1 G/DL (ref 31.5–35.7)
MCV RBC AUTO: 92.5 FL (ref 79–97)
MONOCYTES # BLD AUTO: 0.55 10*3/MM3 (ref 0.1–0.9)
MONOCYTES NFR BLD AUTO: 10.9 % (ref 5–12)
NEUTROPHILS # BLD AUTO: 3.77 10*3/MM3 (ref 1.7–7)
NEUTROPHILS NFR BLD AUTO: 74.6 % (ref 42.7–76)
NITRITE UR QL STRIP: NEGATIVE
NRBC BLD AUTO-RTO: 0 /100 WBC (ref 0–0.2)
PH UR STRIP.AUTO: <=5 [PH] (ref 5–8)
PLATELET # BLD AUTO: 207 10*3/MM3 (ref 140–450)
PMV BLD AUTO: 9.8 FL (ref 6–12)
POTASSIUM BLD-SCNC: 3.4 MMOL/L (ref 3.5–5.2)
PROT SERPL-MCNC: 8.2 G/DL (ref 6–8.5)
PROT UR QL STRIP: ABNORMAL
PROTHROMBIN TIME: 14.7 SECONDS (ref 11.7–14.2)
RBC # BLD AUTO: 4.54 10*6/MM3 (ref 3.77–5.28)
RBC # UR: ABNORMAL /HPF
REF LAB TEST METHOD: ABNORMAL
SODIUM BLD-SCNC: 144 MMOL/L (ref 136–145)
SP GR UR STRIP: >=1.03 (ref 1–1.03)
SQUAMOUS #/AREA URNS HPF: ABNORMAL /HPF
TROPONIN T SERPL-MCNC: <0.01 NG/ML (ref 0–0.03)
UROBILINOGEN UR QL STRIP: ABNORMAL
WBC NRBC COR # BLD: 5.05 10*3/MM3 (ref 3.4–10.8)
WBC UR QL AUTO: ABNORMAL /HPF

## 2019-10-02 PROCEDURE — G0378 HOSPITAL OBSERVATION PER HR: HCPCS

## 2019-10-02 PROCEDURE — 96361 HYDRATE IV INFUSION ADD-ON: CPT

## 2019-10-02 PROCEDURE — 93010 ELECTROCARDIOGRAM REPORT: CPT | Performed by: INTERNAL MEDICINE

## 2019-10-02 PROCEDURE — 25810000003 SODIUM CHLORIDE 0.9 % WITH KCL 20 MEQ 20-0.9 MEQ/L-% SOLUTION: Performed by: HOSPITALIST

## 2019-10-02 PROCEDURE — 93005 ELECTROCARDIOGRAM TRACING: CPT | Performed by: EMERGENCY MEDICINE

## 2019-10-02 PROCEDURE — 82550 ASSAY OF CK (CPK): CPT | Performed by: EMERGENCY MEDICINE

## 2019-10-02 PROCEDURE — 84484 ASSAY OF TROPONIN QUANT: CPT | Performed by: EMERGENCY MEDICINE

## 2019-10-02 PROCEDURE — 99285 EMERGENCY DEPT VISIT HI MDM: CPT

## 2019-10-02 PROCEDURE — 82553 CREATINE MB FRACTION: CPT | Performed by: EMERGENCY MEDICINE

## 2019-10-02 PROCEDURE — 80053 COMPREHEN METABOLIC PANEL: CPT | Performed by: EMERGENCY MEDICINE

## 2019-10-02 PROCEDURE — 25010000002 METHYLPREDNISOLONE PER 40 MG: Performed by: INTERNAL MEDICINE

## 2019-10-02 PROCEDURE — P9612 CATHETERIZE FOR URINE SPEC: HCPCS

## 2019-10-02 PROCEDURE — 81001 URINALYSIS AUTO W/SCOPE: CPT | Performed by: EMERGENCY MEDICINE

## 2019-10-02 PROCEDURE — 85025 COMPLETE CBC W/AUTO DIFF WBC: CPT | Performed by: EMERGENCY MEDICINE

## 2019-10-02 PROCEDURE — 25810000003 SODIUM CHLORIDE 0.9 % WITH KCL 20 MEQ 20-0.9 MEQ/L-% SOLUTION: Performed by: INTERNAL MEDICINE

## 2019-10-02 PROCEDURE — 96375 TX/PRO/DX INJ NEW DRUG ADDON: CPT

## 2019-10-02 PROCEDURE — 85610 PROTHROMBIN TIME: CPT | Performed by: EMERGENCY MEDICINE

## 2019-10-02 PROCEDURE — 70450 CT HEAD/BRAIN W/O DYE: CPT

## 2019-10-02 RX ORDER — BISACODYL 10 MG
10 SUPPOSITORY, RECTAL RECTAL DAILY PRN
Status: DISCONTINUED | OUTPATIENT
Start: 2019-10-02 | End: 2019-10-07 | Stop reason: HOSPADM

## 2019-10-02 RX ORDER — ACETAMINOPHEN 650 MG/1
650 SUPPOSITORY RECTAL EVERY 4 HOURS PRN
Status: DISCONTINUED | OUTPATIENT
Start: 2019-10-02 | End: 2019-10-07 | Stop reason: HOSPADM

## 2019-10-02 RX ORDER — SODIUM CHLORIDE AND POTASSIUM CHLORIDE 150; 900 MG/100ML; MG/100ML
50 INJECTION, SOLUTION INTRAVENOUS CONTINUOUS
Status: DISCONTINUED | OUTPATIENT
Start: 2019-10-02 | End: 2019-10-05

## 2019-10-02 RX ORDER — ACETAMINOPHEN 160 MG/5ML
650 SOLUTION ORAL EVERY 4 HOURS PRN
Status: DISCONTINUED | OUTPATIENT
Start: 2019-10-02 | End: 2019-10-07 | Stop reason: HOSPADM

## 2019-10-02 RX ORDER — METHYLPREDNISOLONE SODIUM SUCCINATE 40 MG/ML
20 INJECTION, POWDER, LYOPHILIZED, FOR SOLUTION INTRAMUSCULAR; INTRAVENOUS EVERY 24 HOURS
Status: DISCONTINUED | OUTPATIENT
Start: 2019-10-02 | End: 2019-10-03

## 2019-10-02 RX ORDER — SODIUM CHLORIDE 9 MG/ML
125 INJECTION, SOLUTION INTRAVENOUS CONTINUOUS
Status: DISCONTINUED | OUTPATIENT
Start: 2019-10-02 | End: 2019-10-02

## 2019-10-02 RX ORDER — SODIUM CHLORIDE 0.9 % (FLUSH) 0.9 %
10 SYRINGE (ML) INJECTION AS NEEDED
Status: DISCONTINUED | OUTPATIENT
Start: 2019-10-02 | End: 2019-10-07 | Stop reason: HOSPADM

## 2019-10-02 RX ORDER — ACETAMINOPHEN 500 MG
500 TABLET ORAL ONCE
Status: COMPLETED | OUTPATIENT
Start: 2019-10-02 | End: 2019-10-02

## 2019-10-02 RX ORDER — ACETAMINOPHEN 325 MG/1
650 TABLET ORAL EVERY 4 HOURS PRN
Status: DISCONTINUED | OUTPATIENT
Start: 2019-10-02 | End: 2019-10-07 | Stop reason: HOSPADM

## 2019-10-02 RX ORDER — DOCUSATE SODIUM 100 MG/1
100 CAPSULE, LIQUID FILLED ORAL 2 TIMES DAILY PRN
Status: DISCONTINUED | OUTPATIENT
Start: 2019-10-02 | End: 2019-10-07 | Stop reason: HOSPADM

## 2019-10-02 RX ORDER — PYRIDOSTIGMINE BROMIDE 60 MG/1
60 TABLET ORAL EVERY 8 HOURS SCHEDULED
Status: DISCONTINUED | OUTPATIENT
Start: 2019-10-02 | End: 2019-10-07 | Stop reason: HOSPADM

## 2019-10-02 RX ORDER — ONDANSETRON 2 MG/ML
4 INJECTION INTRAMUSCULAR; INTRAVENOUS EVERY 6 HOURS PRN
Status: DISCONTINUED | OUTPATIENT
Start: 2019-10-02 | End: 2019-10-07 | Stop reason: HOSPADM

## 2019-10-02 RX ORDER — SODIUM CHLORIDE 0.9 % (FLUSH) 0.9 %
10 SYRINGE (ML) INJECTION EVERY 12 HOURS SCHEDULED
Status: DISCONTINUED | OUTPATIENT
Start: 2019-10-02 | End: 2019-10-07 | Stop reason: HOSPADM

## 2019-10-02 RX ORDER — POTASSIUM CHLORIDE 750 MG/1
40 CAPSULE, EXTENDED RELEASE ORAL ONCE
Status: COMPLETED | OUTPATIENT
Start: 2019-10-02 | End: 2019-10-02

## 2019-10-02 RX ORDER — PYRIDOSTIGMINE BROMIDE 60 MG/1
60 TABLET ORAL ONCE
COMMUNITY
End: 2022-08-16 | Stop reason: SDUPTHER

## 2019-10-02 RX ORDER — MORPHINE SULFATE 2 MG/ML
1 INJECTION, SOLUTION INTRAMUSCULAR; INTRAVENOUS EVERY 4 HOURS PRN
Status: DISCONTINUED | OUTPATIENT
Start: 2019-10-02 | End: 2019-10-07 | Stop reason: HOSPADM

## 2019-10-02 RX ORDER — NITROGLYCERIN 0.4 MG/1
0.4 TABLET SUBLINGUAL
Status: DISCONTINUED | OUTPATIENT
Start: 2019-10-02 | End: 2019-10-07 | Stop reason: HOSPADM

## 2019-10-02 RX ORDER — NALOXONE HCL 0.4 MG/ML
0.4 VIAL (ML) INJECTION
Status: DISCONTINUED | OUTPATIENT
Start: 2019-10-02 | End: 2019-10-07 | Stop reason: HOSPADM

## 2019-10-02 RX ORDER — ONDANSETRON 4 MG/1
4 TABLET, FILM COATED ORAL EVERY 6 HOURS PRN
Status: DISCONTINUED | OUTPATIENT
Start: 2019-10-02 | End: 2019-10-07 | Stop reason: HOSPADM

## 2019-10-02 RX ADMIN — SODIUM CHLORIDE 125 ML/HR: 9 INJECTION, SOLUTION INTRAVENOUS at 12:32

## 2019-10-02 RX ADMIN — METHYLPREDNISOLONE SODIUM SUCCINATE 20 MG: 40 INJECTION, POWDER, FOR SOLUTION INTRAMUSCULAR; INTRAVENOUS at 23:14

## 2019-10-02 RX ADMIN — ACETAMINOPHEN 500 MG: 500 TABLET, FILM COATED ORAL at 13:25

## 2019-10-02 RX ADMIN — SODIUM CHLORIDE, PRESERVATIVE FREE 10 ML: 5 INJECTION INTRAVENOUS at 23:15

## 2019-10-02 RX ADMIN — SODIUM CHLORIDE 500 ML: 9 INJECTION, SOLUTION INTRAVENOUS at 12:32

## 2019-10-02 RX ADMIN — POTASSIUM CHLORIDE AND SODIUM CHLORIDE 125 ML/HR: 900; 150 INJECTION, SOLUTION INTRAVENOUS at 23:15

## 2019-10-02 RX ADMIN — PYRIDOSTIGMINE BROMIDE 60 MG: 60 TABLET ORAL at 23:14

## 2019-10-02 RX ADMIN — POTASSIUM CHLORIDE 40 MEQ: 750 CAPSULE, EXTENDED RELEASE ORAL at 23:14

## 2019-10-02 SDOH — SOCIAL STABILITY - SOCIAL INSECURITY: PROBLEMS RELATED TO LIVING ALONE: Z60.2

## 2019-10-02 NOTE — ED PROVIDER NOTES
EMERGENCY DEPARTMENT ENCOUNTER    CHIEF COMPLAINT  Chief Complaint: headache  History given by: patient and EMS  History limited by: the patient has a hx of dementia.  Room Number: 35/35  PMD: Laura Reed APRN      HPI:  Pt is a 86 y.o. female who presents to the ED from home via EMS complaining of a headache that began earlier this morning. The patient reports she believes her headache is from dehydration. However, the patient did have an unwitnessed fall sometime yesterday night. The patient is unsure of the mechanism of the fall. EMS reports the patient was allegedly on the floor for about 12 hours until she was able to get to her phone and call her daughter who then called EMS. However, the exact time the patient was on the floor is unknown. The patient denies neck pain, hip pain, back pain, chest pain, shortness of breath, abdominal pain, dysuria, nausea, vomiting, or any other sustaining injuries. The patient reports she is currently prescribed Pyridostigmine Bromide 60 mg TID d/t her hx of myasthenia gravis. However, the patient states the medication causes GI bleeding so she has not taken it in the past three days. The patient denies currently taking any other medications on a daily basis. The patient reports she lives at home by herself and does not have any family in [place] to help care for her. There are no other complaints at this time. The patient's history is limited as the patient has a hx of dementia.    Duration: since earlier this morning  Onset: gradual  Timing: constant  Location: head  Quality: pain  Intensity/Severity: moderate  Progression: not specified  Associated Symptoms: none specified  Aggravating Factors: none specified  Alleviating Factors: none specified  Previous Episodes: none specified  Treatment before arrival: none specified    PAST MEDICAL HISTORY  Active Ambulatory Problems     Diagnosis Date Noted   • Weakness 02/12/2019     Resolved Ambulatory Problems     Diagnosis Date  Noted   • No Resolved Ambulatory Problems     Past Medical History:   Diagnosis Date   • Arthritis    • Dementia (CMS/HCC)    • Memory loss    • Vision loss        PAST SURGICAL HISTORY  Past Surgical History:   Procedure Laterality Date   • CYST REMOVAL     • TONSILLECTOMY     • WISDOM TOOTH EXTRACTION         FAMILY HISTORY  Family History   Problem Relation Age of Onset   • Arthritis Mother    • Arthritis Father    • Arthritis Sister        SOCIAL HISTORY  Social History     Socioeconomic History   • Marital status:      Spouse name: Not on file   • Number of children: 4   • Years of education: 12   • Highest education level: Not on file   Occupational History   • Occupation: retired   Tobacco Use   • Smoking status: Never Smoker   • Smokeless tobacco: Never Used   Substance and Sexual Activity   • Alcohol use: Yes     Alcohol/week: 0.6 oz     Types: 1 Glasses of wine per week     Frequency: Never   • Drug use: No       ALLERGIES  Penicillins    REVIEW OF SYSTEMS  Review of Systems   Unable to perform ROS: Dementia   Respiratory: Negative for shortness of breath.    Cardiovascular: Negative for chest pain.   Gastrointestinal: Negative for abdominal pain, nausea and vomiting.   Genitourinary: Negative for dysuria.   Musculoskeletal: Negative for arthralgias (hips), back pain and neck pain.   Neurological: Positive for headaches.       PHYSICAL EXAM  ED Triage Vitals [10/02/19 1158]   Temp Heart Rate Resp BP SpO2   99.4 °F (37.4 °C) 77 -- 147/88 95 %      Temp src Heart Rate Source Patient Position BP Location FiO2 (%)   Tympanic -- -- -- --       Physical Exam   Constitutional: No distress.   She is elderly. She is talkative.   HENT:   Head: Normocephalic and atraumatic.   Eyes: Conjunctivae and EOM are normal. Pupils are equal, round, and reactive to light. No scleral icterus.   Fundoscopic exam:       The right eye shows no papilledema.        The left eye shows no papilledema.   Neck: Normal range of  motion. Neck supple.   No meningismus. No step off.   Cardiovascular: Normal rate, regular rhythm and normal heart sounds. Exam reveals no gallop and no friction rub.   No murmur heard.  Pulses:       Radial pulses are 2+ on the right side, and 2+ on the left side.   Pulmonary/Chest: Effort normal and breath sounds normal. No respiratory distress. She has no decreased breath sounds. She has no wheezes. She has no rhonchi. She has no rales. She exhibits no tenderness.   Abdominal: Soft. Bowel sounds are normal. She exhibits no distension and no mass. There is no rebound.   There is diffuse voluntary guarding. The abdomen is atraumatic.   Musculoskeletal: Normal range of motion. She exhibits no edema.   Neurological: She is alert. She has normal sensation, normal strength and intact cranial nerves. GCS score is 15.   Oriented to self only.   Skin: Skin is warm and dry. No rash noted.   There is ecchymosis and abrasions to the back and bilateral elbows consistent with prolonged prostration.   Psychiatric: Mood and affect normal.   Nursing note and vitals reviewed.      LAB RESULTS  Lab Results (last 24 hours)     Procedure Component Value Units Date/Time    CBC & Differential [[id number]] Collected:  10/02/19 1224    Specimen:  Blood Updated:  10/02/19 1253    Narrative:       The following orders were created for panel order CBC & Differential.  Procedure                               Abnormality         Status                     ---------                               -----------         ------                     CBC Auto Differential[[id number]]        Abnormal            Final result                 Please view results for these tests on the individual orders.    Comprehensive Metabolic Panel [[id number]]  (Abnormal) Collected:  10/02/19 1224    Specimen:  Blood from Arm, Right Updated:  10/02/19 1319     Glucose 119 mg/dL      BUN 9 mg/dL      Creatinine 0.72 mg/dL      Sodium 144 mmol/L      Potassium 3.4 mmol/L       Chloride 101 mmol/L      CO2 28.1 mmol/L      Calcium 9.5 mg/dL      Total Protein 8.2 g/dL      Albumin 4.60 g/dL      ALT (SGPT) 12 U/L      AST (SGOT) 26 U/L      Alkaline Phosphatase 57 U/L      Total Bilirubin 0.8 mg/dL      eGFR Non African Amer 77 mL/min/1.73      Globulin 3.6 gm/dL      A/G Ratio 1.3 g/dL      BUN/Creatinine Ratio 12.5     Anion Gap 14.9 mmol/L     Narrative:       GFR Normal >60  Chronic Kidney Disease <60  Kidney Failure <15    Protime-INR [[id number]]  (Abnormal) Collected:  10/02/19 1224    Specimen:  Blood from Arm, Right Updated:  10/02/19 1311     Protime 14.7 Seconds      INR 1.17    Troponin [[id number]]  (Normal) Collected:  10/02/19 1224    Specimen:  Blood from Arm, Right Updated:  10/02/19 1319     Troponin T <0.010 ng/mL     Narrative:       Troponin T Reference Range:  <= 0.03 ng/mL-   Negative for AMI  >0.03 ng/mL-     Abnormal for myocardial necrosis.  Clinicians would have to utilize clinical acumen, EKG, Troponin and serial changes to determine if it is an Acute Myocardial Infarction or myocardial injury due to an underlying chronic condition.     CK Total & CKMB [[id number]]  (Abnormal) Collected:  10/02/19 1224    Specimen:  Blood from Arm, Right Updated:  10/02/19 1319     CKMB 3.47 ng/mL      Creatine Kinase 238 U/L     CBC Auto Differential [[id number]]  (Abnormal) Collected:  10/02/19 1224    Specimen:  Blood from Arm, Right Updated:  10/02/19 1253     WBC 5.05 10*3/mm3      RBC 4.54 10*6/mm3      Hemoglobin 13.9 g/dL      Hematocrit 42.0 %      MCV 92.5 fL      MCH 30.6 pg      MCHC 33.1 g/dL      RDW 14.4 %      RDW-SD 49.3 fl      MPV 9.8 fL      Platelets 207 10*3/mm3      Neutrophil % 74.6 %      Lymphocyte % 13.7 %      Monocyte % 10.9 %      Eosinophil % 0.0 %      Basophil % 0.4 %      Immature Grans % 0.4 %      Neutrophils, Absolute 3.77 10*3/mm3      Lymphocytes, Absolute 0.69 10*3/mm3      Monocytes, Absolute 0.55 10*3/mm3      Eosinophils, Absolute 0.00  10*3/mm3      Basophils, Absolute 0.02 10*3/mm3      Immature Grans, Absolute 0.02 10*3/mm3      nRBC 0.0 /100 WBC     Urinalysis With Microscopic If Indicated (No Culture) - Urine, Catheter [[id number]]  (Abnormal) Collected:  10/02/19 1241    Specimen:  Urine, Catheter Updated:  10/02/19 1257     Color, UA Dark Yellow     Appearance, UA Clear     pH, UA <=5.0     Specific Gravity, UA >=1.030     Glucose, UA Negative     Ketones, UA 40 mg/dL (2+)     Bilirubin, UA Negative     Blood, UA Negative     Protein, UA 30 mg/dL (1+)     Leuk Esterase, UA Negative     Nitrite, UA Negative     Urobilinogen, UA 1.0 E.U./dL    Urinalysis, Microscopic Only - Urine, Catheter [[id number]]  (Abnormal) Collected:  10/02/19 1241    Specimen:  Urine, Catheter Updated:  10/02/19 1323     RBC, UA None Seen /HPF      WBC, UA 0-2 /HPF      Bacteria, UA 1+ /HPF      Squamous Epithelial Cells, UA 0-2 /HPF      Hyaline Casts, UA 0-2 /LPF      Methodology Manual Light Microscopy          I ordered the above labs and reviewed the results    RADIOLOGY  CT Head Without Contrast   Preliminary Result   There is age-appropriate atrophy, moderate small vessel   ischemic disease and mild to moderate vascular calcification. There is   no evidence of fracture or hemorrhage. Further evaluation could be   performed with a MRI examination of brain as indicated.               Radiation dose reduction techniques were utilized, including automated   exposure control and exposure modulation based on body size.                   I ordered the above noted radiological studies. Interpreted by radiologist. Reviewed by me in PACS.       PROCEDURES  Procedures    EKG          EKG time: 1243  Rhythm/Rate: SR, 75  P waves and AZ: nml; nml  QRS, axis: narrow QRS complex  ST and T waves: no ST elevation appreciated, QTc within normal limits     Interpreted Contemporaneously by me, independently viewed  Unchanged compared to prior from 02/13/2019.      PROGRESS AND  CONSULTS  1223 Ordered CT Head, UA, EKG, and blood work for further evaluation. Also ordered Tylenol for pain management and IV Fluids for hydration.    1520 Rechecked the patient who is resting comfortably and in NAD. Patient is stable. BP- 162/90 HR- 62 Temp- 99.4 °F (37.4 °C) (Tympanic) O2 sat- 95%. Informed the patient of her negative acute CT Head and unremarkable labs. Discussed the plan for admission for further evaluation and management. Pt understands and agrees with the plan, all questions answered.    1529 Notified CCP RN Laura about the patient's case. She agrees that it is not safe for the patient to be discharged home. She also agrees with the plan to admit the patient under observation status for further management.    1610 Placed call to [facility] for admission.    1642 Received a call from Dr. Jaimie Beaver ([facility]) and discussed pt's case. Dr. Jaimie Beaver agreed with plan to admit the patient to med/surg obs for further evaluation and management.    MEDICAL DECISION MAKING  Results were reviewed/discussed with the patient and they were also made aware of online access. Pt also made aware that some labs, such as cultures, will not be resulted during ER visit and follow up with PMD is necessary.     MDM  Number of Diagnoses or Management Options     Amount and/or Complexity of Data Reviewed  Clinical lab tests: ordered and reviewed (Potassium: 3.4 and CK: 238)  Tests in the radiology section of CPT®: ordered and reviewed (CT Head is negative acute.)  Tests in the medicine section of CPT®: ordered and reviewed (See procedure notes for EKG interpretation.)  Decide to obtain previous medical records or to obtain history from someone other than the patient: yes (Epic)  Obtain history from someone other than the patient: yes (EMS)  Review and summarize past medical records: yes (The patient was seen by Neurology in 03/2019 for memory loss and difficulty walking. The patient was diagnosed with myasthenia  gravis.)  Discuss the patient with other providers: yes (Dr. Jaimie Beaver ([facility]))  Independent visualization of images, tracings, or specimens: yes    Patient Progress  Patient progress: stable         DIAGNOSIS  Final diagnoses:   Fall with prolonged prostration   Memory difficulties   Lives alone without help available       DISPOSITION  ADMISSION TO MED/SURG OBS    Discussed treatment plan and reason for admission with pt/family and admitting physician.  Pt/family voiced understanding of the plan for admission for further testing/treatment as needed.     Latest Documented Vital Signs:  As of 4:44 PM  BP- 162/90 HR- 62 Temp- 99.4 °F (37.4 °C) (Tympanic) O2 sat- 95%    --  Documentation assistance provided by maged Ferraro for Dr. Pb Payne MD.  Information recorded by the scribe was done at my direction and has been verified and validated by me.     Luz Ferraro  10/02/19 0371       Pb Payne MD  10/02/19 8898

## 2019-10-02 NOTE — ED NOTES
"Patient to er per ems from home with c/o daughter got a call from patient this am to reported she fell last night. Reported was in the floor over 12 hours until ems could get to her. Reported she called the daughter this am. Reported she could not get to phone until this am. Patient c/o headache and unable to stand. EMS reported no air in the house and the hourse was very hot.\"       Tulio Contreras RN  10/02/19 7478    "

## 2019-10-02 NOTE — PROGRESS NOTES
"Long discussion with daughter, Karen Mccarthy ([phone]) re pts status.Daughter states that her mother has been in and out of \"mental hospitals\" most of her life. Daughter stated that she will not take her medications nor will she leave her home and live with any of her daughters. Informed daughter Karen, that pt is confused at currently unsafe to be home alone. Pt stated to this [facility] nurse that she did not know how long she was on the floor, how she was getting her food and nor her medications. Laura Moreno RN    "

## 2019-10-03 PROBLEM — D72.810 LYMPHOCYTOPENIA: Status: ACTIVE | Noted: 2019-10-03

## 2019-10-03 PROBLEM — E87.6 HYPOKALEMIA: Status: ACTIVE | Noted: 2019-10-03

## 2019-10-03 PROBLEM — G60.9 IDIOPATHIC PERIPHERAL NEUROPATHY: Status: ACTIVE | Noted: 2019-10-03

## 2019-10-03 PROBLEM — F02.80 DEMENTIA OF THE ALZHEIMER'S TYPE WITH LATE ONSET WITHOUT BEHAVIORAL DISTURBANCE: Chronic | Status: ACTIVE | Noted: 2019-10-03

## 2019-10-03 PROBLEM — G30.1 DEMENTIA OF THE ALZHEIMER'S TYPE WITH LATE ONSET WITHOUT BEHAVIORAL DISTURBANCE (HCC): Chronic | Status: ACTIVE | Noted: 2019-10-03

## 2019-10-03 PROBLEM — R27.8 SENSORY ATAXIA: Status: ACTIVE | Noted: 2019-10-03

## 2019-10-03 LAB
ANION GAP SERPL CALCULATED.3IONS-SCNC: 11 MMOL/L (ref 5–15)
BASOPHILS # BLD AUTO: 0 10*3/MM3 (ref 0–0.2)
BASOPHILS NFR BLD AUTO: 0 % (ref 0–1.5)
BUN BLD-MCNC: 6 MG/DL (ref 8–23)
BUN/CREAT SERPL: 11.5 (ref 7–25)
CALCIUM SPEC-SCNC: 8.1 MG/DL (ref 8.6–10.5)
CHLORIDE SERPL-SCNC: 107 MMOL/L (ref 98–107)
CO2 SERPL-SCNC: 24 MMOL/L (ref 22–29)
CREAT BLD-MCNC: 0.52 MG/DL (ref 0.57–1)
DEPRECATED RDW RBC AUTO: 48.2 FL (ref 37–54)
EOSINOPHIL # BLD AUTO: 0 10*3/MM3 (ref 0–0.4)
EOSINOPHIL NFR BLD AUTO: 0 % (ref 0.3–6.2)
ERYTHROCYTE [DISTWIDTH] IN BLOOD BY AUTOMATED COUNT: 14 % (ref 12.3–15.4)
GFR SERPL CREATININE-BSD FRML MDRD: 112 ML/MIN/1.73
GLUCOSE BLD-MCNC: 133 MG/DL (ref 65–99)
GLUCOSE BLDC GLUCOMTR-MCNC: 97 MG/DL (ref 70–130)
HCT VFR BLD AUTO: 32.6 % (ref 34–46.6)
HCT VFR BLD AUTO: 34.2 % (ref 34–46.6)
HCT VFR BLD AUTO: 36 % (ref 34–46.6)
HGB BLD-MCNC: 10.7 G/DL (ref 12–15.9)
HGB BLD-MCNC: 11.4 G/DL (ref 12–15.9)
HGB BLD-MCNC: 11.7 G/DL (ref 12–15.9)
IMM GRANULOCYTES # BLD AUTO: 0.01 10*3/MM3 (ref 0–0.05)
IMM GRANULOCYTES NFR BLD AUTO: 0.4 % (ref 0–0.5)
LYMPHOCYTES # BLD AUTO: 0.46 10*3/MM3 (ref 0.7–3.1)
LYMPHOCYTES NFR BLD AUTO: 17.2 % (ref 19.6–45.3)
MAGNESIUM SERPL-MCNC: 2 MG/DL (ref 1.6–2.4)
MCH RBC QN AUTO: 30.8 PG (ref 26.6–33)
MCHC RBC AUTO-ENTMCNC: 33.3 G/DL (ref 31.5–35.7)
MCV RBC AUTO: 92.4 FL (ref 79–97)
MONOCYTES # BLD AUTO: 0.09 10*3/MM3 (ref 0.1–0.9)
MONOCYTES NFR BLD AUTO: 3.4 % (ref 5–12)
NEUTROPHILS # BLD AUTO: 2.12 10*3/MM3 (ref 1.7–7)
NEUTROPHILS NFR BLD AUTO: 79 % (ref 42.7–76)
NRBC BLD AUTO-RTO: 0 /100 WBC (ref 0–0.2)
PHOSPHATE SERPL-MCNC: 2.7 MG/DL (ref 2.5–4.5)
PLATELET # BLD AUTO: 162 10*3/MM3 (ref 140–450)
PMV BLD AUTO: 9.5 FL (ref 6–12)
POTASSIUM BLD-SCNC: 4.5 MMOL/L (ref 3.5–5.2)
RBC # BLD AUTO: 3.7 10*6/MM3 (ref 3.77–5.28)
SODIUM BLD-SCNC: 142 MMOL/L (ref 136–145)
TROPONIN T SERPL-MCNC: <0.01 NG/ML (ref 0–0.03)
TSH SERPL DL<=0.05 MIU/L-ACNC: 0.55 UIU/ML (ref 0.27–4.2)
WBC NRBC COR # BLD: 2.68 10*3/MM3 (ref 3.4–10.8)

## 2019-10-03 PROCEDURE — G0378 HOSPITAL OBSERVATION PER HR: HCPCS

## 2019-10-03 PROCEDURE — 83735 ASSAY OF MAGNESIUM: CPT | Performed by: INTERNAL MEDICINE

## 2019-10-03 PROCEDURE — 84484 ASSAY OF TROPONIN QUANT: CPT | Performed by: INTERNAL MEDICINE

## 2019-10-03 PROCEDURE — 85018 HEMOGLOBIN: CPT | Performed by: INTERNAL MEDICINE

## 2019-10-03 PROCEDURE — 84100 ASSAY OF PHOSPHORUS: CPT | Performed by: INTERNAL MEDICINE

## 2019-10-03 PROCEDURE — 96361 HYDRATE IV INFUSION ADD-ON: CPT

## 2019-10-03 PROCEDURE — 99222 1ST HOSP IP/OBS MODERATE 55: CPT | Performed by: PSYCHIATRY & NEUROLOGY

## 2019-10-03 PROCEDURE — 85025 COMPLETE CBC W/AUTO DIFF WBC: CPT | Performed by: INTERNAL MEDICINE

## 2019-10-03 PROCEDURE — 97110 THERAPEUTIC EXERCISES: CPT

## 2019-10-03 PROCEDURE — 97535 SELF CARE MNGMENT TRAINING: CPT

## 2019-10-03 PROCEDURE — 84443 ASSAY THYROID STIM HORMONE: CPT | Performed by: INTERNAL MEDICINE

## 2019-10-03 PROCEDURE — 25810000003 SODIUM CHLORIDE 0.9 % WITH KCL 20 MEQ 20-0.9 MEQ/L-% SOLUTION: Performed by: HOSPITALIST

## 2019-10-03 PROCEDURE — 85014 HEMATOCRIT: CPT | Performed by: INTERNAL MEDICINE

## 2019-10-03 PROCEDURE — 25810000003 SODIUM CHLORIDE 0.9 % WITH KCL 20 MEQ 20-0.9 MEQ/L-% SOLUTION: Performed by: INTERNAL MEDICINE

## 2019-10-03 PROCEDURE — 97162 PT EVAL MOD COMPLEX 30 MIN: CPT

## 2019-10-03 PROCEDURE — 92610 EVALUATE SWALLOWING FUNCTION: CPT | Performed by: SPEECH-LANGUAGE PATHOLOGIST

## 2019-10-03 PROCEDURE — 97165 OT EVAL LOW COMPLEX 30 MIN: CPT

## 2019-10-03 PROCEDURE — 80048 BASIC METABOLIC PNL TOTAL CA: CPT | Performed by: INTERNAL MEDICINE

## 2019-10-03 PROCEDURE — 36415 COLL VENOUS BLD VENIPUNCTURE: CPT | Performed by: INTERNAL MEDICINE

## 2019-10-03 PROCEDURE — 82962 GLUCOSE BLOOD TEST: CPT

## 2019-10-03 RX ADMIN — PYRIDOSTIGMINE BROMIDE 60 MG: 60 TABLET ORAL at 15:38

## 2019-10-03 RX ADMIN — PYRIDOSTIGMINE BROMIDE 60 MG: 60 TABLET ORAL at 06:18

## 2019-10-03 RX ADMIN — POTASSIUM CHLORIDE AND SODIUM CHLORIDE 125 ML/HR: 900; 150 INJECTION, SOLUTION INTRAVENOUS at 09:11

## 2019-10-03 RX ADMIN — POTASSIUM CHLORIDE AND SODIUM CHLORIDE 125 ML/HR: 900; 150 INJECTION, SOLUTION INTRAVENOUS at 17:39

## 2019-10-03 NOTE — THERAPY EVALUATION
Acute Care - Speech Language Pathology   Swallow Initial Evaluation [facility]     Patient Name: Wendi Fofana  : 1933  MRN: [id number]  Today's Date: 10/3/2019               Admit Date: 10/2/2019    Visit Dx:     ICD-10-CM ICD-9-CM   1. Fall with prolonged prostration W19.XXXA E888.9   2. Memory difficulties R41.3 780.93   3. Lives alone without help available Z60.2 V60.4   4. Other dysphagia R13.19 787.29     Patient Active Problem List   Diagnosis   • Weakness   • Fall   • Myasthenia gravis with exacerbation (CMS/HCC)   • Dementia    • Lives alone   • Headache   • Myasthenia gravis with (acute) exacerbation (CMS/HCC)   • Lymphocytopenia     Past Medical History:   Diagnosis Date   • Arthritis    • Dementia (CMS/HCC)    • Memory loss    • Myasthenia gravis (CMS/HCC)    • Vision loss      Past Surgical History:   Procedure Laterality Date   • CYST REMOVAL     • TONSILLECTOMY     • WISDOM TOOTH EXTRACTION          SWALLOW EVALUATION (last 72 hours)      SLP Adult Swallow Evaluation     Row Name 10/03/19 1200                   Rehab Evaluation    Document Type  evaluation  -JJ        Subjective Information  no complaints  -JJ        Patient Observations  alert;agree to therapy;cooperative  -JJ        Patient/Family Observations  Pleasantly confused but cooperative  -JJ        Patient Effort  good  -JJ        Symptoms Noted During/After Treatment  none  -JJ           General Information    Patient Profile Reviewed  yes  -JJ        Pertinent History Of Current Problem  Pt admitted after fall. Hx of dementia, myasthenia gravis, GI bleed, dementia, psych issues  -JJ        Current Method of Nutrition  soft textures;chopped;honey-thick liquids  -JJ        Precautions/Limitations, Vision  WFL;WFL with corrective lenses;for purposes of eval  -JJ        Precautions/Limitations, Hearing  WFL;for purposes of eval  -JJ        Prior Level of Function-Communication  cognitive-linguistic impairment  -JJ        Prior Level  of Function-Swallowing  no diet consistency restrictions  -JJ        Plans/Goals Discussed with  patient;agreed upon  -        Barriers to Rehab  none identified  -J        Patient's Goals for Discharge  patient did not state  -        Family Goals for Discharge  family did not state;other (see comments) none present  -JJ           Oral Motor and Function    Dentition Assessment  upper dentures/partial in place;lower dentures/partial in place  -J        Secretion Management  WNL/WFL  -JJ        Mucosal Quality  moist, healthy  -J        Volitional Swallow  WFL  -JJ        Volitional Cough  WFL  -JJ           Oral Musculature and Cranial Nerve Assessment    Oral Motor General Assessment  WFL  -JJ           General Eating/Swallowing Observations    Eating/Swallowing Skills  fed by SLP;other (see comments) Pt observed with lunch tray  -        Positioning During Eating  upright 90 degree;upright in bed  -        Utensils Used  spoon;straw  -JJ        Consistencies Trialed  regular textures;soft textures;chopped;thin liquids  -           Clinical Swallow Eval    Oral Prep Phase  WFL  -JJ        Oral Transit  WFL  -JJ        Oral Residue  WFL  -JJ        Pharyngeal Phase  no overt signs/symptoms of pharyngeal impairment  -JJ        Esophageal Phase  unremarkable  -        Clinical Swallow Evaluation Summary  Pt observed with lunch tray. Pt seen with thins via straw, mechanical soft, and regular. No s/s aspiration or difficulty noted with any consistency tested.   -JJ           Clinical Impression    SLP Swallowing Diagnosis  functional oral phase;functional pharyngeal phase  -J        Functional Impact  risk of aspiration/pneumonia  -        Rehab Potential/Prognosis, Swallowing  good, to achieve stated therapy goals  -        Swallow Criteria for Skilled Therapeutic Interventions Met  demonstrates skilled criteria  -           Recommendations    Therapy Frequency (Swallow)  evaluation only  -         SLP Diet Recommendation  regular textures;thin liquids  -        Recommended Precautions and Strategies  upright posture during/after eating  -        SLP Rec. for Method of Medication Administration  meds whole;with thin liquids  -        Monitor for Signs of Aspiration  yes;notify SLP if any concerns  -        Anticipated Dischage Disposition  unknown  -          User Key  (r) = Recorded By, (t) = Taken By, (c) = Cosigned By    Initials Name Effective Dates    FLAKITO Юлия Coombs, MS CCC-SLP 06/08/18 -           EDUCATION  The patient has been educated in the following areas:   Dysphagia (Swallowing Impairment).    SLP Recommendation and Plan  SLP Swallowing Diagnosis: functional oral phase, functional pharyngeal phase  SLP Diet Recommendation: regular textures, thin liquids  Recommended Precautions and Strategies: upright posture during/after eating  SLP Rec. for Method of Medication Administration: meds whole, with thin liquids     Monitor for Signs of Aspiration: yes, notify SLP if any concerns     Swallow Criteria for Skilled Therapeutic Interventions Met: demonstrates skilled criteria  Anticipated Dischage Disposition: unknown  Rehab Potential/Prognosis, Swallowing: good, to achieve stated therapy goals  Therapy Frequency (Swallow): evaluation only          Plan of Care Reviewed With: patient  Progress: improving  Outcome Summary: Swallow eval completed. Recommend regular diet with thins. Straw are okay. Pt needs to eat sitting upright. Give meds whole with thins. No further ST needed at this time. Please re-consult as needed.         SLP Outcome Measures (last 72 hours)      SLP Outcome Measures     Row Name 10/03/19 1200             SLP Outcome Measures    Outcome Measure Used?  Adult NOMS  -         Adult FCM Scores    FCM Chosen  Swallowing  -      Swallowing FCM Score  7  -        User Key  (r) = Recorded By, (t) = Taken By, (c) = Cosigned By    Initials Name Effective Dates     Юлия Chaidez, MS CCC-SLP 06/08/18 -            Time Calculation:   Time Calculation- SLP     Row Name 10/03/19 1300             Time Calculation- SLP    SLP Received On  10/03/19  -CATHERINE        User Key  (r) = Recorded By, (t) = Taken By, (c) = Cosigned By    Initials Name Provider Type    Юлия Chaidez, MS CCC-SLP Speech and Language Pathologist          Therapy Charges for Today     Code Description Service Date Service Provider Modifiers Qty    [id number] HC ST EVAL ORAL PHARYNG SWALLOW 3 10/3/2019 Юлия Coombs, MS CCC-SLP GN 1               Юлия Coombs MS CCC-SLP  10/3/2019

## 2019-10-03 NOTE — THERAPY EVALUATION
Acute Care - Occupational Therapy Initial Evaluation  [facility]     Patient Name: Wendi Fofana  : 1933  MRN: [id number]  Today's Date: 10/3/2019             Admit Date: 10/2/2019       ICD-10-CM ICD-9-CM   1. Fall with prolonged prostration W19.XXXA E888.9   2. Memory difficulties R41.3 780.93   3. Lives alone without help available Z60.2 V60.4   4. Other dysphagia R13.19 787.29     Patient Active Problem List   Diagnosis   • Weakness   • Fall   • Myasthenia gravis with exacerbation (CMS/HCC)   • Dementia    • Lives alone   • Headache   • Myasthenia gravis with (acute) exacerbation (CMS/HCC)   • Lymphocytopenia     Past Medical History:   Diagnosis Date   • Arthritis    • Dementia (CMS/HCC)    • Memory loss    • Myasthenia gravis (CMS/HCC)    • Vision loss      Past Surgical History:   Procedure Laterality Date   • CYST REMOVAL     • TONSILLECTOMY     • WISDOM TOOTH EXTRACTION            OT ASSESSMENT FLOWSHEET (last 12 hours)      Occupational Therapy Evaluation     Row Name 10/03/19 1142                   OT Evaluation Time/Intention    Document Type  evaluation;therapy note (daily note)  -LE        Mode of Treatment  individual therapy;occupational therapy  -LE        Patient Effort  adequate  -LE           General Information    Patient Profile Reviewed?  yes  -LE        Patient Observations  alert  -LE        Prior Level of Function  independent:;gait;transfer;ADL's pt living alone  -LE        Equipment Currently Used at Home  raised toilet  -LE        Pertinent History of Current Functional Problem  from home after fall.  h/o dementi and mysathenia gravis.  per chart h/o psychatric care and non compliance with medications.   -LE        Existing Precautions/Restrictions  fall  -LE        Equipment Issued to Patient  gait belt  -LE           Relationship/Environment    Primary Source of Support/Comfort  child(roshan) per chart pt declines to move in with dghts  -LE        Lives With  alone  -LE            Cognitive Assessment/Intervention- PT/OT    Orientation Status (Cognition)  oriented to;person;situation  -LE        Follows Commands (Cognition)  follows one step commands;repetition of directions required;25-49% accuracy  -LE        Safety Deficit (Cognitive)  moderate deficit;severe deficit;judgment;problem solving;safety precautions awareness;insight into deficits/self awareness  -LE        Cognitive Assessment/Intervention Comment  perseverates, needs redirection.  OT repeats education on role of OT and difference between OT and PT.   -LE           Bed Mobility Assessment/Treatment    Supine-Sit Union (Bed Mobility)  contact guard  -LE           Functional Mobility    Functional Mobility- Ind. Level  minimum assist (75% patient effort)  -LE        Functional Mobility- Device  rolling walker  -LE        Functional Mobility-Distance (Feet)  -- 10 feet to bathroom  -LE        Functional Mobility- Safety Issues  balance decreased during turns;sequencing ability decreased;step length decreased  -LE        Functional Mobility- Comment  -- unsteady  -LE           Transfer Assessment/Treatment    Transfer Assessment/Treatment  sit-stand transfer;stand-sit transfer;toilet transfer  -LE        Comment (Transfers)  -- VC and assist hand placement   -LE           Sit-Stand Transfer    Sit-Stand Union (Transfers)  minimum assist (75% patient effort)  -LE        Assistive Device (Sit-Stand Transfers)  walker, front-wheeled  -LE           Stand-Sit Transfer    Stand-Sit Union (Transfers)  minimum assist (75% patient effort)  -LE        Assistive Device (Stand-Sit Transfers)  walker, front-wheeled  -LE           Toilet Transfer    Type (Toilet Transfer)  stand pivot/stand step  -LE        Union Level (Toilet Transfer)  minimum assist (75% patient effort)  -LE        Assistive Device (Toilet Transfer)  grab bars/safety frame;walker, front-wheeled  -LE           ADL Assessment/Intervention    24180 -  OT Self Care/Mgmt Minutes  -- did not further assess ADL tasks  -LE        BADL Assessment/Intervention  bathing;upper body dressing;lower body dressing;feeding;toileting;grooming  -LE        Additional Documentation  -- pt required increase time to complete BM and left with aid.   -LE           Self-Feeding Assessment/Training    [place] Level (Feeding)  -- denies difficulty self feeding  -LE           Toileting Assessment/Training    Comment (Toileting)  assisted with removing purwick, lowering brief. pt spends increase time for bowel movement due h/o prolapse.  pt left with aid to finish toileting  -LE           General ROM    GENERAL ROM COMMENTS  -- B UE 7/8 AROM  -LE           MMT (Manual Muscle Testing)    General MMT Comments  c/o fatigue R hand during toileting not formally tested due toileting concerns.   -LE           Motor Assessment/Interventions    Additional Documentation  Balance (Group);Balance Interventions (Group)  -LE           Balance    Balance  static sitting balance;static standing balance  -LE           Static Sitting Balance    Level of Fountain (Unsupported Sitting, Static Balance)  supervision  -LE        Sitting Position (Unsupported Sitting, Static Balance)  -- on toilet  -LE        Time Able to Maintain Position (Unsupported Sitting, Static Balance)  more than 5 minutes over 10 min  -LE           Positioning and Restraints    Pre-Treatment Position  in bed  -LE        Post Treatment Position  bathroom  -LE        Bathroom  sitting;call light within reach;encouraged to call for assist with aid who agrees to stay with pt. notify RN.   -LE           Pain Scale: FACES Pre/Post-Treatment    Pain: FACES Scale, Pretreatment  0-->no hurt  -LE           Coping    Observed Emotional State  apprehensive  -LE           Plan of Care Review    Plan of Care Reviewed With  patient  -LE           Clinical Impression (OT)    OT Diagnosis  need for assist with personal care  -LE         Patient/Family Goals Statement (OT Eval)  increase independence to take care of self.   -LE        Criteria for Skilled Therapeutic Interventions Met (OT Eval)  treatment indicated;yes  -LE        Rehab Potential (OT Eval)  good, to achieve stated therapy goals  -LE        Therapy Frequency (OT Eval)  5 times/wk  -LE        Care Plan Review (OT)  evaluation/treatment results reviewed;care plan/treatment goals reviewed  -LE        Anticipated Equipment Needs at Discharge (OT)  front wheeled walker  -LE        Anticipated Discharge Disposition (OT)  skilled nursing facility  -LE           Vital Signs    O2 Delivery Pre Treatment  room air  -LE        O2 Delivery Intra Treatment  room air  -LE        O2 Delivery Post Treatment  room air  -LE        Pre Patient Position  Supine  -LE        Intra Patient Position  Standing  -LE        Post Patient Position  Sitting  -LE           Planned OT Interventions    Planned Therapy Interventions (OT Eval)  activity tolerance training;adaptive equipment training;BADL retraining;transfer/mobility retraining;patient/caregiver education/training;functional balance retraining  -LE           OT Goals    Transfer Goal Selection (OT)  transfer, OT goal 1  -LE        Dressing Goal Selection (OT)  dressing, OT goal 1  -LE        Functional Mobility Goal Selection (OT)  functional mobility, OT goal 1  -LE        Additional Documentation  Functional Mobility Selection (OT) (Row)  -LE           Transfer Goal 1 (OT)    Activity/Assistive Device (Transfer Goal 1, OT)  sit-to-stand/stand-to-sit;bed-to-chair/chair-to-bed;toilet;walker, rolling  -LE        Ridgeway Level/Cues Needed (Transfer Goal 1, OT)  supervision required;set-up required  -LE        Time Frame (Transfer Goal 1, OT)  1 week  -LE        Progress/Outcome (Transfer Goal 1, OT)  goal ongoing  -LE           Dressing Goal 1 (OT)    Activity/Assistive Device (Dressing Goal 1, OT)  upper body dressing;lower body dressing  -LE         Willet/Cues Needed (Dressing Goal 1, OT)  minimum assist (75% or more patient effort)  -LE        Time Frame (Dressing Goal 1, OT)  1 week  -LE        Progress/Outcome (Dressing Goal 1, OT)  goal ongoing  -LE           Functional Mobility Goal 1 (OT)    Activity/Assistive Device (Functional Mobility Goal 1, OT)  walker, rolling  -LE        Willet Level/Cues Needed (Functional Mobility Goal 1, OT)  set-up required;supervision required  -LE        Distance Goal 1 (Functional Mobility, OT)  -- to/from bathroom  -LE        Time Frame (Functional Mobility Goal 1, OT)  1 week  -LE        Progress/Outcome (Functional Mobility Goal 1, OT)  goal ongoing  -LE          User Key  (r) = Recorded By, (t) = Taken By, (c) = Cosigned By    Initials Name Effective Dates    Jen Recio, OTR 06/08/18 -          Occupational Therapy Education     Title: PT OT SLP Therapies (Done)     Topic: Occupational Therapy (Done)     Point: ADL training (Done)     Description: Instruct learner(s) on proper safety adaptation and remediation techniques during self care or transfers.   Instruct in proper use of assistive devices.    Learning Progress Summary           Patient Acceptance, E,D, VU by TRAVIS at 10/3/2019  1:22 PM    Comment:  Repetive ed on role of OT in acute care. Pt perseverates on difference between OT and PT and education and explanation repeated several times to pt.                   Point: Home exercise program (Done)     Description: Instruct learner(s) on appropriate technique for monitoring, assisting and/or progressing therapeutic exercises/activities.    Learning Progress Summary           Patient Acceptance, E,D, VU by TRAVIS at 10/3/2019  1:22 PM    Comment:  Repetive ed on role of OT in acute care. Pt perseverates on difference between OT and PT and education and explanation repeated several times to pt.                   Point: Precautions (Done)     Description: Instruct learner(s) on prescribed precautions  during self-care and functional transfers.    Learning Progress Summary           Patient Acceptance, E,D, VU by TRAVIS at 10/3/2019  1:22 PM    Comment:  Repetive ed on role of OT in acute care. Pt perseverates on difference between OT and PT and education and explanation repeated several times to pt.                   Point: Body mechanics (Done)     Description: Instruct learner(s) on proper positioning and spine alignment during self-care, functional mobility activities and/or exercises.    Learning Progress Summary           Patient Acceptance, E,D, VU by TRAVIS at 10/3/2019  1:22 PM    Comment:  Repetive ed on role of OT in acute care. Pt perseverates on difference between OT and PT and education and explanation repeated several times to pt.                               User Key     Initials Effective Dates Name Provider Type Discipline    TRAVIS 06/08/18 -  Jen Roberto OTR Occupational Therapist OT                  OT Recommendation and Plan  Outcome Summary/Treatment Plan (OT)  Anticipated Equipment Needs at Discharge (OT): front wheeled walker  Anticipated Discharge Disposition (OT): skilled nursing facility  Planned Therapy Interventions (OT Eval): activity tolerance training, adaptive equipment training, BADL retraining, transfer/mobility retraining, patient/caregiver education/training, functional balance retraining  Therapy Frequency (OT Eval): 5 times/wk  Plan of Care Review  Plan of Care Reviewed With: patient  Plan of Care Reviewed With: patient  Outcome Summary: Pt presents from home with fall. Pt with h/o dementia and myasthenia gravis and lives alone. Pt currently Min A to get OOB, ambulate to bathroom .  Up to max A for ADL tasks.  Pt may benefit from skilled OT to increase safety and independence.  Agree with plan for SNU at d/c.     Outcome Measures     Row Name 10/03/19 1300 10/03/19 1142          How much help from another is currently needed...    Putting on and taking off regular lower body  clothing?  --  2  -LE     Bathing (including washing, rinsing, and drying)  --  2  -LE     Toileting (which includes using toilet bed pan or urinal)  --  2  -LE     Putting on and taking off regular upper body clothing  --  2  -LE     Taking care of personal grooming (such as brushing teeth)  --  3  -LE     Eating meals  --  3  -LE     AM-PAC 6 Clicks Score (OT)  --  14  -LE        Functional Assessment    Outcome Measure Options  AM-PAC 6 Clicks Daily Activity (OT)  -LE  --       User Key  (r) = Recorded By, (t) = Taken By, (c) = Cosigned By    Initials Name Provider Type    Jen Recio OTR Occupational Therapist          Time Calculation:   Time Calculation- OT     Row Name 10/03/19 1333 10/03/19 1142          Time Calculation- OT    OT Start Time  1120  -LE  --     OT Stop Time  1142  -LE  --     OT Time Calculation (min)  22 min  -LE  --     Total Timed Code Minutes- OT  15 minute(s)  -LE  --     OT Received On  10/03/19  -LE  --     OT Goal Re-Cert Due Date  10/10/19  -LE  --        Timed Charges    09600 - OT Self Care/Mgmt Minutes  --  -- did not further assess ADL tasks  -LE       User Key  (r) = Recorded By, (t) = Taken By, (c) = Cosigned By    Initials Name Provider Type    Jen Recio OTR Occupational Therapist        Therapy Charges for Today     Code Description Service Date Service Provider Modifiers Qty    [id number]  OT EVAL LOW COMPLEXITY 2 10/3/2019 Jen Roberto OTR GO 1    [id number]  OT SELF CARE/MGMT/TRAIN EA 15 MIN 10/3/2019 Jen Roberto OTR GO 1               ELOISA Canada  10/3/2019

## 2019-10-03 NOTE — PLAN OF CARE
Problem: Patient Care Overview  Goal: Plan of Care Review  Outcome: Outcome(s) achieved Date Met: 10/03/19   10/03/19 9996   Coping/Psychosocial   Plan of Care Reviewed With patient   Plan of Care Review   Progress improving   OTHER   Outcome Summary Swallow eval completed. Recommend regular diet with thins. Straw are okay. Pt needs to eat sitting upright. Give meds whole with thins. No further ST needed at this time. Please re-consult as needed.

## 2019-10-03 NOTE — PLAN OF CARE
Problem: Patient Care Overview  Goal: Plan of Care Review  Outcome: Ongoing (interventions implemented as appropriate)   10/03/19 1321   Coping/Psychosocial   Plan of Care Reviewed With patient   OTHER   Outcome Summary Pt presents from home with fall. Pt with h/o dementia and myasthenia gravis and lives alone. Pt currently Min A to get OOB, ambulate to bathroom . Up to max A for ADL tasks. Pt may benefit from skilled OT to increase safety and independence. Agree with plan for SNU at d/c.

## 2019-10-03 NOTE — DISCHARGE PLACEMENT REQUEST
"Wood Belcher (86 y.o. Female)     Date of Birth Social Security Number Address Home Phone MRN    04/14/1933  [id number]   [facility] [address] [phone] [id number]    T[employer]ist Marital Status          None        Admission Date Admission Type Admitting Provider Attending Provider Department, Room/Bed    10/2/19 Emergency Jaimie Beaver MD Benton, John B, MD [address], [address]    Discharge Date Discharge Disposition Discharge Destination                       Attending Provider:  Volodymyr Gutierrez MD    Allergies:  Penicillins    Isolation:  None   Infection:  None   Code Status:  CPR    Ht:  160 cm (63\")   Wt:  47.5 kg (104 lb 12.8 oz)    Admission Cmt:  None   Principal Problem:  Fall [W19.XXXA]                 Active Insurance as of 10/2/2019     Primary Coverage     Payor Plan Insurance Group Employer/Plan Group    HUMANA MEDICARE REPLACEMENT HUMANA MEDICARE REPL [id number]     Payor Plan Address Payor Plan Phone Number Payor Plan Fax Number Effective Dates    [address] [address] [phone]  1/1/2019 - None Entered    [facility] [id number]       Subscriber Name Subscriber Birth Date Member ID       WOOD BELCHER 4/14/1933 [id number]                 Emergency Contacts      (Rel.) Home Phone Work Phone Mobile Phone    kati morales (Daughter) -- -- [phone]    SANTANA MORALES (Daughter) [phone] -- [phone]    JOS SMITH [phone] -- [phone]              "

## 2019-10-03 NOTE — PLAN OF CARE
Problem: Fall Risk (Adult)  Goal: Identify Related Risk Factors and Signs and Symptoms  Outcome: Ongoing (interventions implemented as appropriate)    Goal: Absence of Fall  Outcome: Ongoing (interventions implemented as appropriate)      Problem: Skin Injury Risk (Adult)  Goal: Identify Related Risk Factors and Signs and Symptoms  Outcome: Ongoing (interventions implemented as appropriate)    Goal: Skin Health and Integrity  Outcome: Ongoing (interventions implemented as appropriate)      Problem: Patient Care Overview  Goal: Plan of Care Review  Outcome: Ongoing (interventions implemented as appropriate)   10/03/19 0545   Coping/Psychosocial   Plan of Care Reviewed With patient   Plan of Care Review   Progress no change   OTHER   Outcome Summary pt A/O x 3 but is forgetful at times. Hx of dementia. purewick in place with good output. vitals are stable. pt desated into high 80s and RN placed 2L NC on patient, O2 came up to 95%. IV fluids infusing. In need of medical records from previous psych visits. bed alarm on and active. Pt very weak and has not got out of bed through the night. NSR on monitor.        Problem: Activity Intolerance (Adult)  Goal: Identify Related Risk Factors and Signs and Symptoms  Outcome: Ongoing (interventions implemented as appropriate)    Goal: Activity Tolerance  Outcome: Ongoing (interventions implemented as appropriate)  Patient weak, unable to get out of bed. Refused orthostatic BPs  Goal: Effective Energy Conservation Techniques  Outcome: Ongoing (interventions implemented as appropriate)

## 2019-10-03 NOTE — PLAN OF CARE
Problem: Patient Care Overview  Goal: Plan of Care Review  Outcome: Ongoing (interventions implemented as appropriate)   10/03/19 1931   Coping/Psychosocial   Plan of Care Reviewed With patient   Plan of Care Review   Progress improving   OTHER   Outcome Summary confused, very mistrustful and paranoid of staff and plan/orders. IVF infusing. VSS, on RA tolerating well. PT/OT worked w/ pt, will likely SNF upon d/c. CTM.

## 2019-10-03 NOTE — CONSULTS
"Pt is an 86 year old, ,  female seen today in 508. Upon approach she is sitting up in bed and watching tv. Her mood is euthymic, affect is congruent and appropriate. She is alert & oriented to person, place, and situation. She does state she thought today was October 4th or 5th. However she answers all other questions correctly. She has been a  for \"many years\". She lives alone in her home with her cat. She has four adult daughters that all live out of town. She recalls falling as the reason she was admitted, and states that this has happened a couple times and she wishes she knew exactly what was causing her to fall.     She reports that some doctors say she isn't safe to return home by herself, and some doctors say she can. She reports it just isn't feasible for any of her daughters to come stay with her, or for her to move in with any of the daughters. She sites reasons like finances, daughters' medical issues, and daughters' mental health issues as obstacles that would prevent them from progressing.     She denies a current wish to die. She denies any current or history of SI or HI. She denies previous suicide attempts. She is slightly vague when speaking about her past psychiatric history. She does admit to seeing a psychiatrist \"many years ago, probably more than once.\" but does not wish to go into detail. \"I try to forget it. What good is it to bring it all up if it was all in the past?\"     Previous chart notes show that daughterKaren had states that the pt's mistrustful and paranoid behavior is baseline. However during interview, patient did not show any signs of paranoia. Patient made appropriate eye contact. She did ramble at times and have some loose associations, but ultimately her thought content was relevant. Pt smiled when appropriate, and showed frustration when appropriate.     Issues with recent memory is noted, as she asked what department I was with three times during " "interview. Each time, when she was told psychiatry, she would make a joke and laugh. Then she would apologize \"in case that offended you\". She reports other stressors like low social security income, her house needing a lot of repairs, daughter's with medical issues such as post liver & kidney transplants & PCOS.     She reports prior to hospitalization she was driving up until a few weeks ago, when she decided she should probably no longer operate a vehicle. Since then, her daughter has set up grocery delivery service for her.     At this time the patient is more than agreeable to have [facility] staff come and follow up with her each day that she is here. Will follow and offer resources when better idea of discharge plan is arranged.   "

## 2019-10-03 NOTE — THERAPY EVALUATION
Patient Name: Wendi Fofana  : 1933    MRN: [id number]                              Today's Date: 10/3/2019       Admit Date: 10/2/2019    Visit Dx:     ICD-10-CM ICD-9-CM   1. Fall with prolonged prostration W19.XXXA E888.9   2. Memory difficulties R41.3 780.93   3. Lives alone without help available Z60.2 V60.4     Patient Active Problem List   Diagnosis   • Weakness   • Fall   • Myasthenia gravis with exacerbation (CMS/HCC)   • Dementia    • Lives alone   • Headache   • Myasthenia gravis with (acute) exacerbation (CMS/HCC)     Past Medical History:   Diagnosis Date   • Arthritis    • Dementia (CMS/HCC)    • Memory loss    • Myasthenia gravis (CMS/HCC)    • Vision loss      Past Surgical History:   Procedure Laterality Date   • CYST REMOVAL     • TONSILLECTOMY     • WISDOM TOOTH EXTRACTION       General Information     Row Name 10/03/19 1008          PT Evaluation Time/Intention    Document Type  evaluation  -CS     Mode of Treatment  physical therapy  -CS     Row Name 10/03/19 1008          General Information    Patient Profile Reviewed?  yes  -CS     Prior Level of Function  independent:;all household mobility;community mobility  -CS     Existing Precautions/Restrictions  fall  -CS     Row Name 10/03/19 1008          Relationship/Environment    Lives With  alone  -CS     Row Name 10/03/19 1008          Resource/Environmental Concerns    Current Living Arrangements  home/apartment/condo  -CS     Row Name 10/03/19 1008          Cognitive Assessment/Intervention- PT/OT    Orientation Status (Cognition)  oriented to;person  -CS     Row Name 10/03/19 1008          Safety Issues, Functional Mobility    Safety Issues Affecting Function (Mobility)  awareness of need for assistance;insight into deficits/self awareness;judgment;problem solving;safety precautions follow-through/compliance  -CS     Impairments Affecting Function (Mobility)  balance;cognition;endurance/activity tolerance  -CS       User Key  (r) =  Recorded By, (t) = Taken By, (c) = Cosigned By    Initials Name Provider Type    Claudy Lomas, PT Physical Therapist        Mobility     Row Name 10/03/19 1009          Bed Mobility Assessment/Treatment    Bed Mobility Assessment/Treatment  supine-sit;sit-supine  -CS     Supine-Sit [place] (Bed Mobility)  minimum assist (75% patient effort);moderate assist (50% patient effort);2 person assist  -CS     Sit-Supine [place] (Bed Mobility)  moderate assist (50% patient effort);2 person assist  -CS     Assistive Device (Bed Mobility)  bed rails;head of bed elevated  -CS     Row Name 10/03/19 1009          Sit-Stand Transfer    Sit-Stand [place] (Transfers)  minimum assist (75% patient effort);2 person assist  -CS     Assistive Device (Sit-Stand Transfers)  -- HHA  -     Row Name 10/03/19 1009          Gait/Stairs Assessment/Training    Distance in Feet (Gait)  3  -CS     Deviations/Abnormal Patterns (Gait)  base of support, narrow;festinating/shuffling  -CS     Comment (Gait/Stairs)  very fearful of falling- Pt took a couple steps fwd and bwd  -CS       User Key  (r) = Recorded By, (t) = Taken By, (c) = Cosigned By    Initials Name Provider Type    Claudy Lomas, PT Physical Therapist        Obj/Interventions     Row Name 10/03/19 1010          General ROM    GENERAL ROM COMMENTS  WFL  -CS     Row Name 10/03/19 1010          MMT (Manual Muscle Testing)    General MMT Comments  >2+/5  -CS       User Key  (r) = Recorded By, (t) = Taken By, (c) = Cosigned By    Initials Name Provider Type    Claudy Lomas, PT Physical Therapist        Goals/Plan     Row Name 10/03/19 1011          Bed Mobility Goal 1 (PT)    Activity/Assistive Device (Bed Mobility Goal 1, PT)  sit to supine;supine to sit  -CS     [place] Level/Cues Needed (Bed Mobility Goal 1, PT)  conditional independence  -CS     Time Frame (Bed Mobility Goal 1, PT)  1 week  -     Row Name 10/03/19 1011          Transfer Goal 1  (PT)    Activity/Assistive Device (Transfer Goal 1, PT)  sit-to-stand/stand-to-sit;bed-to-chair/chair-to-bed  -CS     [place] Level/Cues Needed (Transfer Goal 1, PT)  supervision required  -CS     Time Frame (Transfer Goal 1, PT)  1 week  -CS     Row Name 10/03/19 1011          Gait Training Goal 1 (PT)    [place] Level (Gait Training Goal 1, PT)  supervision required  -CS     Distance (Gait Goal 1, PT)  100  -CS     Time Frame (Gait Training Goal 1, PT)  1 week  -CS       User Key  (r) = Recorded By, (t) = Taken By, (c) = Cosigned By    Initials Name Provider Type    Claudy Lomas, PT Physical Therapist        Clinical Impression     Row Name 10/03/19 1010          Pain Assessment    Additional Documentation  Pain Scale: FACES Pre/Post-Treatment (Group)  -CS     Row Name 10/03/19 1010          Pain Scale: FACES Pre/Post-Treatment    Pain: FACES Scale, Pretreatment  0-->no hurt  -CS     Pain: FACES Scale, Post-Treatment  0-->no hurt  -CS     Row Name 10/03/19 1010          Plan of Care Review    Plan of Care Reviewed With  patient  -CS     Row Name 10/03/19 1010          Physical Therapy Clinical Impression    Patient/Family Goals Statement (PT Clinical Impression)  rehab  -CS     Criteria for Skilled Interventions Met (PT Clinical Impression)  yes  -CS     Rehab Potential (PT Clinical Summary)  good, to achieve stated therapy goals  -CS     Row Name 10/03/19 1010          Vital Signs    O2 Delivery Pre Treatment  room air  -CS     Row Name 10/03/19 1010          Positioning and Restraints    Pre-Treatment Position  in bed  -CS     Post Treatment Position  bed  -CS     In Bed  supine;call light within reach;encouraged to call for assist;exit alarm on  -CS       User Key  (r) = Recorded By, (t) = Taken By, (c) = Cosigned By    Initials Name Provider Type    Claudy Lomas, PT Physical Therapist        Outcome Measures     Row Name 10/03/19 1016          How much help from another person do you  currently need...    Turning from your back to your side while in flat bed without using bedrails?  3  -CS     Moving from lying on back to sitting on the side of a flat bed without bedrails?  3  -CS     Moving to and from a bed to a chair (including a wheelchair)?  2  -CS     Standing up from a chair using your arms (e.g., wheelchair, bedside chair)?  2  -CS     Climbing 3-5 steps with a railing?  1  -CS     To walk in hospital room?  2  -CS     AM-PAC 6 Clicks Score (PT)  13  -CS     Row Name 10/03/19 1016          Functional Assessment    Outcome Measure Options  AM-PAC 6 Clicks Basic Mobility (PT)  -CS       User Key  (r) = Recorded By, (t) = Taken By, (c) = Cosigned By    Initials Name Provider Type    CS Claudy Cano, PT Physical Therapist        Physical Therapy Education     Title: PT OT SLP Therapies (In Progress)     Topic: Physical Therapy (Done)     Point: Mobility training (Done)     Learning Progress Summary           Patient Acceptance, E,TB, VU,NR by  at 10/3/2019 10:12 AM                   Point: Home exercise program (Done)     Learning Progress Summary           Patient Acceptance, E,TB, VU,NR by  at 10/3/2019 10:12 AM                   Point: Body mechanics (Done)     Learning Progress Summary           Patient Acceptance, E,TB, VU,NR by  at 10/3/2019 10:12 AM                   Point: Precautions (Done)     Learning Progress Summary           Patient Acceptance, E,TB, VU,NR by  at 10/3/2019 10:12 AM                               User Key     Initials Effective Dates Name Provider Type Discipline     05/14/18 -  Claudy Cano, PT Physical Therapist PT              PT Recommendation and Plan  Planned Therapy Interventions (PT Eval): balance training, bed mobility training, gait training, transfer training, stair training  Outcome Summary/Treatment Plan (PT)  Anticipated Discharge Disposition (PT): skilled nursing facility  Plan of Care Reviewed With: patient  Outcome Summary: Pt  admitted s/p fall at home, history of dementia and now present with difficulty walking. This visit she was min-modA with mobility and able to take a few steps and very fearful that she will fall. PLOF is independent, lives at home alone. She is not safe to go home alone at this time for risk of falls. Plan is d/c snf.      Time Calculation:   PT Charges     Row Name 10/03/19 1016             Time Calculation    Start Time  0952  -CS      Stop Time  1006  -CS      Time Calculation (min)  14 min  -CS      PT Received On  10/03/19  -CS      PT - Next Appointment  10/04/19  -      PT Goal Re-Cert Due Date  10/10/19  -CS        User Key  (r) = Recorded By, (t) = Taken By, (c) = Cosigned By    Initials Name Provider Type    CS Claudy Cano, PT Physical Therapist        Therapy Charges for Today     Code Description Service Date Service Provider Modifiers Qty    95125183257  PT EVAL MOD COMPLEXITY 2 10/3/2019 Claudy Cano, PT GP 1    [id number]  PT THER PROC EA 15 MIN 10/3/2019 Claudy Cano, PT GP 1    [id number]  PT THER SUPP EA 15 MIN 10/3/2019 Claudy Cano, PT GP 1          PT G-Codes  Outcome Measure Options: AM-PAC 6 Clicks Basic Mobility (PT)  AM-PAC 6 Clicks Score (PT): 13    Claudy Cano PT  10/3/2019

## 2019-10-03 NOTE — PLAN OF CARE
Problem: Patient Care Overview  Goal: Plan of Care Review  Outcome: Ongoing (interventions implemented as appropriate)   10/03/19 1012   Coping/Psychosocial   Plan of Care Reviewed With patient   OTHER   Outcome Summary Pt admitted s/p fall at home, history of dementia and now present with difficulty walking. This visit she was min-modA with mobility and able to take a few steps and very fearful that she will fall. PLOF is independent, lives at home alone. She is not safe to go home alone at this time for risk of falls. Plan is d/c snf.

## 2019-10-03 NOTE — PROGRESS NOTES
"   LOS: 1 day   Patient Care Team:  Laura Reed APRN as PCP - General (Family Medicine)    Chief Complaint: confused    Subjective     Very confused, thinks she has been here for days.         Subjective:  Symptoms:  Stable.  She reports weakness.  No shortness of breath, malaise, cough, chest pain, headache, chest pressure, anorexia, diarrhea or anxiety.    Diet:  Adequate intake.  No nausea or vomiting.    Activity level: Impaired due to weakness.    Pain:  She reports no pain.        History taken from: patient chart RN    Objective     Vital Signs  Temp:  [98.2 °F (36.8 °C)-98.4 °F (36.9 °C)] 98.3 °F (36.8 °C)  Heart Rate:  [58-72] 69  Resp:  [18] 18  BP: (119-167)/() 137/88    Objective:  General Appearance:  Comfortable and in no acute distress.    Vital signs: (most recent): Blood pressure 137/88, pulse 69, temperature 98.3 °F (36.8 °C), temperature source Oral, resp. rate 18, height 160 cm (63\"), weight 47.5 kg (104 lb 12.8 oz), SpO2 95 %.  Vital signs are normal.  No fever.    Output: Producing urine.    HEENT: Normal HEENT exam.    Lungs:  Normal effort and normal respiratory rate.  Breath sounds clear to auscultation.  She is not in respiratory distress.    Heart: Normal rate.  Regular rhythm.    Abdomen: Abdomen is soft.  Bowel sounds are normal.   There is no abdominal tenderness.     Extremities: There is no dependent edema.    Pulses: Distal pulses are intact.    Neurological: Patient is alert.  Normal strength.  Patient has normal muscle tone.  (Oriented to person).    Pupils:  Pupils are equal, round, and reactive to light.  (Mild left ptosis).    Skin:  Warm and dry.  No rash.             Results Review:     I reviewed the patient's new clinical results.  I reviewed the patient's other test results and agree with the interpretation  Discussed with pt, RN, CCP, and Dr. Lang    Results from last 7 days   Lab Units 10/03/19  0615 10/03/19  0026 10/02/19  1224   WBC 10*3/mm3 2.68*  --  " 5.05   HEMOGLOBIN g/dL 11.4* 11.7* 13.9   PLATELETS 10*3/mm3 162  --  207     Results from last 7 days   Lab Units 10/03/19  0615 10/02/19  1224   SODIUM mmol/L 142 144   POTASSIUM mmol/L 4.5 3.4*   CHLORIDE mmol/L 107 101   CO2 mmol/L 24.0 28.1   BUN mg/dL 6* 9   CREATININE mg/dL 0.52* 0.72   CALCIUM mg/dL 8.1* 9.5   MAGNESIUM mg/dL 2.0  --    PHOSPHORUS mg/dL 2.7  --    Estimated Creatinine Clearance: 37.9 mL/min (A) (by C-G formula based on SCr of 0.52 mg/dL (L)).    Medication Review: reviewed    Assessment/Plan       Fall    Weakness    Myasthenia gravis with exacerbation (CMS/HCC)    Lives alone    Headache    Lymphocytopenia    Sensory ataxia    Idiopathic peripheral neuropathy    Dementia of the Alzheimer's type with late onset without behavioral disturbance (CMS/HCC)    Hypokalemia          Plan:   (Appreciate Dr. Lang's attention to pt  Continue Mestinon and watch for GI bleeding  No steroids or IVIG indicated  SLP eval fine  PT recommends SNF  APS report filed  Pt will need LTC, this is not going to be easy  All her family lives out of town and are not of much help  Replaced K+  Check anemia labs).       Volodymyr Gutierrez MD  10/03/19  2:15 PM    Time: 40min

## 2019-10-03 NOTE — PROGRESS NOTES
Discharge Planning Assessment  [facility]     Patient Name: Wendi Fofana  MRN: [id number]  Today's Date: 10/3/2019    Admit Date: 10/2/2019    Discharge Needs Assessment     Row Name 10/03/19 1450       Living Environment    Lives With  alone    Current Living Arrangements  home/apartment/condo    Primary Care Provided by  self    Provides Primary Care For  no one, unable/limited ability to care for self    Family Caregiver if Needed  none    Quality of Family Relationships  unable to assess    Able to Return to Prior Arrangements  no       Transition Planning    Patient/Family Anticipates Transition to  inpatient rehabilitation facility    Patient/Family Anticipated Services at Transition  skilled nursing       Discharge Needs Assessment    Concerns to be Addressed  adjustment to diagnosis/illness;basic needs    Equipment Currently Used at Home  cane, quad;walker, rolling    Offered/Gave Vendor List  no Pt has been to [facility] before, family did not want to consider other options right now.         Discharge Plan     Row Name 10/03/19 1450       Plan    Plan  Skilled care.     Patient/Family in Agreement with Plan  yes    Plan Comments  CCP spoke with pt's dtr, Karen Mccarthy ([phone]) who lives in [place].  She states she seen her mother about a month ago and she was not this weak, or confused.  She states that with help from family friends ( her sister's friend Elina Menezes [phone]) that pt has been able to stay in her home.  Her sister Anahi Silva [phone], lives in  [place] , but has friends that assist.  Laurel Worley ([phone]),lives in [place].  Karen says she went back home to [place] to get more clothes,etc.  But says she has quit work, so she can help take care of her mother.   Family's goal would be to get pt back home after skilled rehab stay.  Pt has been to [facility] in the past, and they would like her to return there. Referral made. CCP will follow Suzette Alvarez RN        Destination      Service  Provider Request Status Selected Services Address Phone Number Fax Number    [facility] Pending - Request Sent N/A [address], [facility] [address] [phone]      Durable Medical Equipment      No service coordination in this encounter.      Dialysis/Infusion      No service coordination in this encounter.      Home Medical Care      No service coordination in this encounter.      Therapy      No service coordination in this encounter.      Community Resources      No service coordination in this encounter.          Demographic Summary    No documentation.       Functional Status    No documentation.       Psychosocial    No documentation.       Abuse/Neglect    No documentation.       Legal     Row Name 10/03/19 1450       Financial/Legal    Who Manages Finances if Patient Unable  No living will or HCS        Substance Abuse    No documentation.       Patient Forms    No documentation.           Suzette Alvarez RN

## 2019-10-03 NOTE — H&P
"PCP: Laura Reed APRN    Chief complaint   Chief Complaint   Patient presents with   • Fall   • Headache       HPI  Patient is a 86 y.o. female presents with history of myasthenia gravis, and dementia who lives alone who presents to the ER after a fall overnight and EMS was called.  The history is obtained through the ER doc and EMS due to the patient being a poor historian.  She lives alone and supposedly she fell overnight and she always falls backwards.  She said it felt like someone pushed her but she was too weak to be able to get up.  She called someone this morning unclear if it was family or friends or neighbor and they called EMS and then she was brought in.    Patient states that she does not really take her pyridostigmine consistently.  She stopped that on and off because she says that it causes her bright red blood from her bottom.  She is supposed to take it multiple times a day and she is tried it once a day and she is convinced that it causes her bleeding.     Pt stated to the ER CCP nurse\" that she did not know how long she was on the floor, how she was getting her food and nor her medications.\"  Multiple daughters live out of town and supposedly the patient does not want to go and live with them.  Supposedly she is been in and out of [facility].    PAST MEDICAL HISTORY  Past Medical History:   Diagnosis Date   • Arthritis    • Dementia (CMS/HCC)    • Memory loss    • Myasthenia gravis (CMS/HCC)    • Vision loss        PAST SURGICAL HISTORY  Past Surgical History:   Procedure Laterality Date   • CYST REMOVAL     • TONSILLECTOMY     • WISDOM TOOTH EXTRACTION         FAMILY HISTORY  Family History   Problem Relation Age of Onset   • Arthritis Mother    • Arthritis Father    • Arthritis Sister        SOCIAL HISTORY  Social History     Tobacco Use   • Smoking status: Never Smoker   • Smokeless tobacco: Never Used   Substance Use Topics   • Alcohol use: Yes     Alcohol/week: 0.6 oz     Types: 1 " "Glasses of wine per week     Frequency: Never   • Drug use: No       MEDICATIONS:  Medications Prior to Admission   Medication Sig Dispense Refill Last Dose   • pyridostigmine (MESTINON) 60 MG tablet Take 60 mg by mouth 3 (Three) Times a Day.      • aspirin 81 MG EC tablet Take 81 mg by mouth Daily.   Taking       Allergies:  Penicillins    Review of Systems:  Unable to obtain due to confusion        Vital Signs  Temp:  [99.4 °F (37.4 °C)] 99.4 °F (37.4 °C)  Heart Rate:  [58-77] 64  Resp:  [14-18] 18  BP: (127-172)/() 167/88  Flowsheet Rows      First Filed Value   Admission Height  160 cm (63\") Documented at 10/02/2019 1225   Admission Weight  47.6 kg (105 lb) Documented at 10/02/2019 1225         Body mass index is 18.56 kg/m².    Physical Exam:  General Appearance:    Alert, cooperative, in no acute distress but very particular and somewhat argumentative, thin   Head:    Normocephalic, without obvious abnormality, atraumatic   Eyes:         conjunctivae and sclerae normal, no icterus, PERRLA; ptosis left eye; glasses   ENT:    Ears grossly intact, oral mucosa moist,   Neck:   No adenopathy, supple, trachea midline,    Back:     Normal to inspection, range of motion normal   Lungs:     Clear to auscultation,respirations regular, even and                   unlabored    Heart:    Regular rhythm and normal rate,  no murmur, normal S1 and S2,   Abdomen:     Normal bowel sounds, no masses,  soft non-tender, non-distended,    Extremities:   Moves all extremities well, no cyanosis, no edema,             Pulses:   Pulses palpable and equal bilaterally   Skin:   No bleeding, rash, bruising    Neurologic:    Psych:   Cranial nerves 2 - 12 grossly intact, sensation grossly intact,     Moves all extremities well, equal bilateral strength 4/5    Alert and Oriented x 2, ornery affect   LABS:  Admission on 10/02/2019   Component Date Value Ref Range Status   • Glucose 10/02/2019 119* 65 - 99 mg/dL Final   • BUN 10/02/2019 " 9  8 - 23 mg/dL Final   • Creatinine 10/02/2019 0.72  0.57 - 1.00 mg/dL Final   • Sodium 10/02/2019 144  136 - 145 mmol/L Final   • Potassium 10/02/2019 3.4* 3.5 - 5.2 mmol/L Final   • Chloride 10/02/2019 101  98 - 107 mmol/L Final   • CO2 10/02/2019 28.1  22.0 - 29.0 mmol/L Final   • Calcium 10/02/2019 9.5  8.6 - 10.5 mg/dL Final   • Total Protein 10/02/2019 8.2  6.0 - 8.5 g/dL Final   • Albumin 10/02/2019 4.60  3.50 - 5.20 g/dL Final   • ALT (SGPT) 10/02/2019 12  1 - 33 U/L Final   • AST (SGOT) 10/02/2019 26  1 - 32 U/L Final   • Alkaline Phosphatase 10/02/2019 57  39 - 117 U/L Final   • Total Bilirubin 10/02/2019 0.8  0.2 - 1.2 mg/dL Final   • eGFR Non African Amer 10/02/2019 77  >60 mL/min/1.73 Final   • Globulin 10/02/2019 3.6  gm/dL Final   • A/G Ratio 10/02/2019 1.3  g/dL Final   • BUN/Creatinine Ratio 10/02/2019 12.5  7.0 - 25.0 Final   • Anion Gap 10/02/2019 14.9  5.0 - 15.0 mmol/L Final   • Protime 10/02/2019 14.7* 11.7 - 14.2 Seconds Final   • INR 10/02/2019 1.17* 0.90 - 1.10 Final   • Color, UA 10/02/2019 Dark Yellow* Yellow, Straw Final   • Appearance, UA 10/02/2019 Clear  Clear Final   • pH, UA 10/02/2019 <=5.0  5.0 - 8.0 Final   • Specific Gravity, UA 10/02/2019 >=1.030  1.005 - 1.030 Final   • Glucose, UA 10/02/2019 Negative  Negative Final   • Ketones, UA 10/02/2019 40 mg/dL (2+)* Negative Final   • Bilirubin, UA 10/02/2019 Negative  Negative Final   • Blood, UA 10/02/2019 Negative  Negative Final   • Protein, UA 10/02/2019 30 mg/dL (1+)* Negative Final   • Leuk Esterase, UA 10/02/2019 Negative  Negative Final   • Nitrite, UA 10/02/2019 Negative  Negative Final   • Urobilinogen, UA 10/02/2019 1.0 E.U./dL  0.2 - 1.0 E.U./dL Final   • Troponin T 10/02/2019 <0.010  0.000 - 0.030 ng/mL Final   • CKMB 10/02/2019 3.47  <=5.30 ng/mL Final   • Creatine Kinase 10/02/2019 238* 20 - 180 U/L Final   • WBC 10/02/2019 5.05  3.40 - 10.80 10*3/mm3 Final   • RBC 10/02/2019 4.54  3.77 - 5.28 10*6/mm3 Final   •  Hemoglobin 10/02/2019 13.9  12.0 - 15.9 g/dL Final   • Hematocrit 10/02/2019 42.0  34.0 - 46.6 % Final   • MCV 10/02/2019 92.5  79.0 - 97.0 fL Final   • MCH 10/02/2019 30.6  26.6 - 33.0 pg Final   • MCHC 10/02/2019 33.1  31.5 - 35.7 g/dL Final   • RDW 10/02/2019 14.4  12.3 - 15.4 % Final   • RDW-SD 10/02/2019 49.3  37.0 - 54.0 fl Final   • MPV 10/02/2019 9.8  6.0 - 12.0 fL Final   • Platelets 10/02/2019 207  140 - 450 10*3/mm3 Final   • Neutrophil % 10/02/2019 74.6  42.7 - 76.0 % Final   • Lymphocyte % 10/02/2019 13.7* 19.6 - 45.3 % Final   • Monocyte % 10/02/2019 10.9  5.0 - 12.0 % Final   • Eosinophil % 10/02/2019 0.0* 0.3 - 6.2 % Final   • Basophil % 10/02/2019 0.4  0.0 - 1.5 % Final   • Immature Grans % 10/02/2019 0.4  0.0 - 0.5 % Final   • Neutrophils, Absolute 10/02/2019 3.77  1.70 - 7.00 10*3/mm3 Final   • Lymphocytes, Absolute 10/02/2019 0.69* 0.70 - 3.10 10*3/mm3 Final   • Monocytes, Absolute 10/02/2019 0.55  0.10 - 0.90 10*3/mm3 Final   • Eosinophils, Absolute 10/02/2019 0.00  0.00 - 0.40 10*3/mm3 Final   • Basophils, Absolute 10/02/2019 0.02  0.00 - 0.20 10*3/mm3 Final   • Immature Grans, Absolute 10/02/2019 0.02  0.00 - 0.05 10*3/mm3 Final   • nRBC 10/02/2019 0.0  0.0 - 0.2 /100 WBC Final   • RBC, UA 10/02/2019 None Seen  None Seen, 0-2 /HPF Final   • WBC, UA 10/02/2019 0-2  None Seen, 0-2 /HPF Final   • Bacteria, UA 10/02/2019 1+* None Seen /HPF Final   • Squamous Epithelial Cells, UA 10/02/2019 0-2  None Seen, 0-2 /HPF Final   • Hyaline Casts, UA 10/02/2019 0-2  None Seen /LPF Final   • Methodology 10/02/2019 Manual Light Microscopy   Final       DIAGNOSTICS:  Ct Head Without Contrast    Result Date: 10/2/2019  There is age-appropriate atrophy, moderate small vessel ischemic disease and mild to moderate vascular calcification. There is no evidence of fracture or hemorrhage. Further evaluation could be performed with a MRI examination of brain as indicated.    Radiation dose reduction techniques were  utilized, including automated exposure control and exposure modulation based on body size.  This report was finalized on 10/2/2019 5:23 PM by Dr. Contreras Carlos M.D.             Results Review:   I reviewed the patient's new clinical results.  Discussed with ER physician  I personally viewed and interpreted the patient's EKG/Telemetry data sinus   Old records reviewed  See above    ASSESSMENT AND PLAN     + Myasthenia gravis with exacerbation   -iv steroids  -consult neuro  -restart pyridostigmine-although the patient claims that it is causing her bleeding.  That is not an apparent side effect.  At that point it would be a risk and benefit and we can monitor H&H.  -PT OT and speech therapy eval  -Swallow eval and precautions    + Weakness/Fall  -Likely due to generalized weakness, myasthenia gravis exacerbation, without medication  -No evidence of other metabolic contributions.  -Fall precautions  -PT/OT evaluate and treat     + GI bleed/hematochezia  -Unsure at what point this is happened due to poor historian  -We will monitor H&H  -iV fluids    + Hypokalemia-replace and recheck     + Dementia -been documented previously.      +Lives alone-see the [facility] nurses note  -Consult .  Supposedly the patient does not know how she is getting her food or medications.    + Past psych story per the daughter telling the nurse      + Headache-if she has ice in her water.  She is very particular about no ice.    +DVT proph: scd   +Medical decision maker:    I discussed the patients findings and my recommendations with the patient and/or family.  Please reference all orders placed.    Jaimie Beaver MD  10/02/19  9:54 PM

## 2019-10-03 NOTE — CONSULTS
"Neurology Consult Note    Consult Date: 10/3/2019    Referring MD: Jaimie Beaver,*    Reason for Consult I have been asked to see the patient in neurological consultation to render advice and opinion regarding myasthenia gravis    Wendi Fofana is a 86 y.o. female with reported history of myasthenia gravis on Mestinon, dementia, admitted after a fall.  Patient is unable to provide an accurate history due to dementia.  Reportedly she was found down, unknown downtime.  Apparently her air-conditioner had not been working in her home for an unknown period of time.  All of her children live out of town.  When asked about myasthenia gravis patient endorses taking medication for eyelid droop but she does not know if it is effective or not.  She thinks Mestinon has caused her to have GI bleeding in the past.    Past Medical History:   Diagnosis Date   • Arthritis    • Dementia (CMS/HCC)    • Memory loss    • Myasthenia gravis (CMS/HCC)    • Vision loss        ROS:  No fevers, chills  No weakness, numbness    Exam    /83 (BP Location: Left arm, Patient Position: Lying)   Pulse 61   Temp 98.4 °F (36.9 °C) (Oral)   Resp 18   Ht 160 cm (63\")   Wt 47.5 kg (104 lb 12.8 oz)   SpO2 98%   BMI 18.56 kg/m²   Gen: NAD, vitals reviewed  MS: Oriented x2, recent/remote memory markedly impaired, limited attention/concentration, language intact, no neglect  CN: visual acuity grossly normal, PERRL, left ptosis, EOMI, no facial droop, no dysarthria  Motor: 5/5 throughout upper and lower extremities, normal tone  Sensory: Markedly diminished vibratory sensation distal lower extremities in a length dependent pattern    DATA:    Lab Results   Component Value Date    GLUCOSE 133 (H) 10/03/2019    CALCIUM 8.1 (L) 10/03/2019     10/03/2019    K 4.5 10/03/2019    CO2 24.0 10/03/2019     10/03/2019    BUN 6 (L) 10/03/2019    CREATININE 0.52 (L) 10/03/2019    EGFRIFNONA 112 10/03/2019    BCR 11.5 10/03/2019    ANIONGAP " 11.0 10/03/2019     Lab Results   Component Value Date    WBC 2.68 (L) 10/03/2019    HGB 11.4 (L) 10/03/2019    HCT 34.2 10/03/2019    MCV 92.4 10/03/2019     10/03/2019       Lab review: WBC 2.7, hemoglobin 11.4, GFR normal    Imaging review: CT head report reviewed, shows generalized atrophy, moderate small vessel disease, no acute intracranial abnormality    Diagnoses:  Ocular myasthenia  Alzheimer's dementia, late onset, without behavioral disturbance    Comment: The main problem is that this is a patient with advanced Alzheimer's disease who lives alone.  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  She does have some ptosis in the left eye that could be consistent with ocular myasthenia but I cannot get enough history from her to be sure about the diagnosis.  I think it is fine to continue low-dose Mestinon; this does not cause GI bleeding but can cause loose stool and it does not negatively affect her mental status and Alzheimer's disease.    PLAN:  Fine to continue Mestinon  No indication for steroids or IVIG  She has sensory ataxia and likely should not walk without a walker  Probably needs placement given her advanced dementia and ataxia    We will see as needed while in the hospital

## 2019-10-03 NOTE — CONSULTS
Adult Nutrition  Assessment/PES    Patient Name:  Wendi Fofana  YOB: 1933  MRN: [id number]  Admit Date:  10/2/2019    Assessment Date:  10/3/2019    Comments:  Consult for chronic poor intake, MST score of 3 per nurse admission screen.  Admitted s/p fall.  Per chart, patient has been in and out of [facility].  Dementia.  S/p SLP evaluation today.    Patient reports good appetite, reports it is typically pretty good as well.  She does not drink ONS like Ensure or Boost, but is willing to try.  She is unsure of UBW or weight loss.      Adding Boost Plus daily.    RD to continue to follow.    Reason for Assessment     Row Name 10/03/19 1412          Reason for Assessment    Reason For Assessment  physician consult;identified at risk by screening criteria     Diagnosis  neurologic conditions myasthenia gravis, dementia, arthritis; adm s/p fall     Identified At Risk by Screening Criteria  MST SCORE 2+         Nutrition/Diet History     Row Name 10/03/19 1412          Nutrition/Diet History    Typical Food/Fluid Intake  patient reports good appetite, says it is typically this way     Supplemental Drinks/Foods/Additives  she agrees to try Boost Plus during admission         Anthropometrics     Row Name 10/03/19 1413          Admit Weight    Admit Weight  -- 104# 10/2        Usual Body Weight (UBW)    Weight Loss Time Frame  she unsure of UBW or weight loss        Body Mass Index (BMI)    BMI Assessment  BMI 18.5-24.9: normal         Labs/Tests/Procedures/Meds     Row Name 10/03/19 1413          Labs/Procedures/Meds    Lab Results Reviewed  reviewed, pertinent     Lab Results Comments  Creat, BUN, Ca, WBC, Hgb        Diagnostic Tests/Procedures    Diagnostic Test/Procedure Reviewed  reviewed, pertinent     Diagnostic Test/Procedures Comments  s/p SLP evaluation today        Medications    Pertinent Medications Reviewed  reviewed, pertinent     Pertinent Medications Comments  IVFs         Physical  Findings     Row Name 10/03/19 1414          Physical Findings    Overall Physical Appearance  generalized wasting     Skin  -- B=14, intact         Estimated/Assessed Needs     Row Name 10/03/19 1414          Calculation Measurements    Weight Used For Calculations  47.5 kg (104 lb 11.5 oz)        Estimated/Assessed Needs       KCAL/KG    KCAL/KG  25 Kcal/Kg (kcal);30 Kcal/Kg (kcal)     25 Kcal/Kg (kcal)  1187.5     30 Kcal/Kg (kcal)  1425        Protein Requirements    Est Protein Requirement Amount (gms/kg); used 1.0-1.2 g/kg  48-57        Fluid Requirements    Estimated Fluid Requirements (mL/day)  1425         Nutrition Prescription Ordered     Row Name 10/03/19 1422          Nutrition Prescription PO    Current PO Diet  Regular         Evaluation of Received Nutrient/Fluid Intake     Row Name 10/03/19 1422          PO Evaluation    Number of Meals  2     % PO Intake  50 0-100%         Problem/Interventions:  Problem 1     Row Name 10/03/19 1423          Nutrition Diagnoses Problem 1    Problem 1  Predicted Suboptimal Intake     Etiology (related to)  Medical Diagnosis     Neurological  Dementia     Signs/Symptoms (evidenced by)  Report/Observation         Intervention Goal     Row Name 10/03/19 1424          Intervention Goal    General  Maintain nutrition;Reduce/improve symptoms;Disease management/therapy     PO  Increase intake;PO intake (%)     PO Intake %  75 %     Weight  Maintain weight         Nutrition Intervention     Row Name 10/03/19 1424          Nutrition Intervention    RD/Tech Action  Follow Tx progress;Care plan reviewd;Encourage intake;Recommend/ordered     Recommended/Ordered  Supplement         Nutrition Prescription     Row Name 10/03/19 1424          Nutrition Prescription PO    PO Prescription  Begin/change supplement     Supplement  Boost Plus     Supplement Frequency  Daily     New PO Prescription Ordered?  Yes         Education/Evaluation     Row Name 10/03/19 1424          Education     Education  Will Instruct as appropriate        Monitor/Evaluation    Monitor  PO intake;Per protocol;Pertinent labs;Weight     Education Follow-up  Reinforce PRN           Electronically signed by:  Jennifer Killian RD  10/03/19 2:26 PM

## 2019-10-03 NOTE — NURSING NOTE
"S/w pt's dgt Pat Boss. Pat explained pt \"has not been to [facility] facility since 1960s\". Also explained pt's \"mistrustful and paranoid behavior is baseline\" for the pt; and that she exhibits this behavior toward doctors/medicine, as well as her own children.  Pat also confirmed to RN that pt does live alone in a house, and that the air conditioning has been out for \"some time\".She acknowledged that it needs to be fixed, and will try to do so in the next week. RN informed dgt that temps in [place] have been in the high 90s all week, as dgt lives in [place]. Pat informed RN that pt \"refuses to live with any of her children, refuses to leave her house\"; Pat states she would be \"willing to live with pt until she dies\". RN explained to dgt that pt may need rehab upon discharge, and that is not safe for pt to return to her house, [place]. if AC is not working. Dgt agreeable, reported pt has been to rehab facility on \"[address]\" in the past. RN notified CCP and Dr. Gutierrez.   "

## 2019-10-03 NOTE — PROGRESS NOTES
Continued Stay Note  [facility]     Patient Name: Wendi Fofana  MRN: [id number]  Today's Date: 10/3/2019    Admit Date: 10/2/2019    Discharge Plan     Row Name 10/03/19 9613       Plan    Plan  Follow with APS recommendations     Patient/Family in Agreement with Plan  yes    Plan Comments  CCP spoke with APS hotline/Ines; case #[id number] was accepted and assigned to supervisor, Lucy Marte [phone] and  Bushra Garrett [phone]. CCP left voicemail for return call. Judy Vogel CSSNOW     Row Name 10/03/19 4225       Plan    Plan  Skilled care.     Patient/Family in Agreement with Plan  yes    Plan Comments  CCP spoke with pt's dtr, Karen Mccarthy ([phone]) who lives in [place].  She states she seen her mother about a month ago and she was not this weak, or confused.  She states that with help from family friends ( her sister's friend Elina Menezes [phone]) that pt has been able to stay in her home.  Her sister Anahi Silva [phone], lives in  [place] , but has friends that assist.  Laurel Worley ([phone]),lives in [place].  Karen says she went back home to [place] to get more clothes,etc.  But says she has quit work, so she can help take care of her mother.   Family's goal would be to get pt back home after skilled rehab stay.  Pt has been to [facility] in the past, and they would like her to return there. Referral made. CCP will follow Suzette Alvarez RN        Discharge Codes    No documentation.             LISBETH Daniels

## 2019-10-04 PROBLEM — E53.8 B12 DEFICIENCY: Status: ACTIVE | Noted: 2019-10-04

## 2019-10-04 PROBLEM — E55.9 VITAMIN D DEFICIENCY: Status: ACTIVE | Noted: 2019-10-04

## 2019-10-04 PROBLEM — D63.8 ANEMIA OF CHRONIC DISEASE: Status: ACTIVE | Noted: 2019-10-04

## 2019-10-04 PROBLEM — K62.3 PARTIAL RECTAL PROLAPSE: Chronic | Status: ACTIVE | Noted: 2019-10-04

## 2019-10-04 LAB
25(OH)D3 SERPL-MCNC: 13.9 NG/ML (ref 30–100)
ALBUMIN SERPL-MCNC: 3.4 G/DL (ref 3.5–5.2)
ALBUMIN/GLOB SERPL: 1.3 G/DL
ALP SERPL-CCNC: 42 U/L (ref 39–117)
ALT SERPL W P-5'-P-CCNC: 7 U/L (ref 1–33)
ANION GAP SERPL CALCULATED.3IONS-SCNC: 6.9 MMOL/L (ref 5–15)
AST SERPL-CCNC: 17 U/L (ref 1–32)
BASOPHILS # BLD AUTO: 0.01 10*3/MM3 (ref 0–0.2)
BASOPHILS NFR BLD AUTO: 0.3 % (ref 0–1.5)
BILIRUB SERPL-MCNC: 0.3 MG/DL (ref 0.2–1.2)
BUN BLD-MCNC: 8 MG/DL (ref 8–23)
BUN/CREAT SERPL: 14.3 (ref 7–25)
CALCIUM SPEC-SCNC: 7.7 MG/DL (ref 8.6–10.5)
CHLORIDE SERPL-SCNC: 108 MMOL/L (ref 98–107)
CO2 SERPL-SCNC: 24.1 MMOL/L (ref 22–29)
CREAT BLD-MCNC: 0.56 MG/DL (ref 0.57–1)
DEPRECATED RDW RBC AUTO: 46.4 FL (ref 37–54)
EOSINOPHIL # BLD AUTO: 0.1 10*3/MM3 (ref 0–0.4)
EOSINOPHIL NFR BLD AUTO: 3.1 % (ref 0.3–6.2)
ERYTHROCYTE [DISTWIDTH] IN BLOOD BY AUTOMATED COUNT: 14 % (ref 12.3–15.4)
FERRITIN SERPL-MCNC: 116 NG/ML (ref 13–150)
FOLATE SERPL-MCNC: 7.86 NG/ML (ref 4.78–24.2)
GFR SERPL CREATININE-BSD FRML MDRD: 103 ML/MIN/1.73
GLOBULIN UR ELPH-MCNC: 2.7 GM/DL
GLUCOSE BLD-MCNC: 95 MG/DL (ref 65–99)
HCT VFR BLD AUTO: 31.1 % (ref 34–46.6)
HGB BLD-MCNC: 10.5 G/DL (ref 12–15.9)
IMM GRANULOCYTES # BLD AUTO: 0.01 10*3/MM3 (ref 0–0.05)
IMM GRANULOCYTES NFR BLD AUTO: 0.3 % (ref 0–0.5)
IRON 24H UR-MRATE: 30 MCG/DL (ref 37–145)
IRON SATN MFR SERPL: 12 % (ref 20–50)
LYMPHOCYTES # BLD AUTO: 1.3 10*3/MM3 (ref 0.7–3.1)
LYMPHOCYTES NFR BLD AUTO: 40 % (ref 19.6–45.3)
MAGNESIUM SERPL-MCNC: 2 MG/DL (ref 1.6–2.4)
MCH RBC QN AUTO: 30.7 PG (ref 26.6–33)
MCHC RBC AUTO-ENTMCNC: 33.8 G/DL (ref 31.5–35.7)
MCV RBC AUTO: 90.9 FL (ref 79–97)
MONOCYTES # BLD AUTO: 0.31 10*3/MM3 (ref 0.1–0.9)
MONOCYTES NFR BLD AUTO: 9.5 % (ref 5–12)
NEUTROPHILS # BLD AUTO: 1.52 10*3/MM3 (ref 1.7–7)
NEUTROPHILS NFR BLD AUTO: 46.8 % (ref 42.7–76)
NRBC BLD AUTO-RTO: 0 /100 WBC (ref 0–0.2)
PLATELET # BLD AUTO: 156 10*3/MM3 (ref 140–450)
PMV BLD AUTO: 9.5 FL (ref 6–12)
POTASSIUM BLD-SCNC: 4.2 MMOL/L (ref 3.5–5.2)
PROT SERPL-MCNC: 6.1 G/DL (ref 6–8.5)
RBC # BLD AUTO: 3.42 10*6/MM3 (ref 3.77–5.28)
SODIUM BLD-SCNC: 139 MMOL/L (ref 136–145)
TIBC SERPL-MCNC: 246 MCG/DL (ref 298–536)
TRANSFERRIN SERPL-MCNC: 165 MG/DL (ref 200–360)
VIT B12 BLD-MCNC: 374 PG/ML (ref 211–946)
WBC NRBC COR # BLD: 3.25 10*3/MM3 (ref 3.4–10.8)

## 2019-10-04 PROCEDURE — G0378 HOSPITAL OBSERVATION PER HR: HCPCS

## 2019-10-04 PROCEDURE — 25810000003 SODIUM CHLORIDE 0.9 % WITH KCL 20 MEQ 20-0.9 MEQ/L-% SOLUTION: Performed by: INTERNAL MEDICINE

## 2019-10-04 PROCEDURE — 80053 COMPREHEN METABOLIC PANEL: CPT | Performed by: HOSPITALIST

## 2019-10-04 PROCEDURE — 82746 ASSAY OF FOLIC ACID SERUM: CPT | Performed by: HOSPITALIST

## 2019-10-04 PROCEDURE — 82607 VITAMIN B-12: CPT | Performed by: HOSPITALIST

## 2019-10-04 PROCEDURE — 83540 ASSAY OF IRON: CPT | Performed by: HOSPITALIST

## 2019-10-04 PROCEDURE — 85025 COMPLETE CBC W/AUTO DIFF WBC: CPT | Performed by: INTERNAL MEDICINE

## 2019-10-04 PROCEDURE — 84466 ASSAY OF TRANSFERRIN: CPT | Performed by: HOSPITALIST

## 2019-10-04 PROCEDURE — 82728 ASSAY OF FERRITIN: CPT | Performed by: HOSPITALIST

## 2019-10-04 PROCEDURE — 82306 VITAMIN D 25 HYDROXY: CPT | Performed by: HOSPITALIST

## 2019-10-04 PROCEDURE — 83735 ASSAY OF MAGNESIUM: CPT | Performed by: HOSPITALIST

## 2019-10-04 PROCEDURE — 25810000003 SODIUM CHLORIDE 0.9 % WITH KCL 20 MEQ 20-0.9 MEQ/L-% SOLUTION: Performed by: HOSPITALIST

## 2019-10-04 RX ORDER — MELATONIN
1000 DAILY
Status: DISCONTINUED | OUTPATIENT
Start: 2019-10-04 | End: 2019-10-07 | Stop reason: HOSPADM

## 2019-10-04 RX ORDER — CYANOCOBALAMIN 1000 UG/ML
1000 INJECTION, SOLUTION INTRAMUSCULAR; SUBCUTANEOUS DAILY
Status: DISPENSED | OUTPATIENT
Start: 2019-10-04 | End: 2019-10-07

## 2019-10-04 RX ADMIN — POTASSIUM CHLORIDE AND SODIUM CHLORIDE 125 ML/HR: 900; 150 INJECTION, SOLUTION INTRAVENOUS at 01:39

## 2019-10-04 RX ADMIN — POTASSIUM CHLORIDE AND SODIUM CHLORIDE 125 ML/HR: 900; 150 INJECTION, SOLUTION INTRAVENOUS at 10:39

## 2019-10-04 RX ADMIN — SODIUM CHLORIDE, PRESERVATIVE FREE 10 ML: 5 INJECTION INTRAVENOUS at 09:07

## 2019-10-04 RX ADMIN — PYRIDOSTIGMINE BROMIDE 60 MG: 60 TABLET ORAL at 06:22

## 2019-10-04 RX ADMIN — POTASSIUM CHLORIDE AND SODIUM CHLORIDE 125 ML/HR: 900; 150 INJECTION, SOLUTION INTRAVENOUS at 18:30

## 2019-10-04 RX ADMIN — VITAMIN D, TAB 1000IU (100/BT) 1000 UNITS: 25 TAB at 20:44

## 2019-10-04 NOTE — DISCHARGE PLACEMENT REQUEST
"Wood Belcher (86 y.o. Female)     Date of Birth Social Security Number Address Home Phone MRN    04/14/1933  [id number]   [facility] [phone] [id number]    Denominational Marital Status          None        Admission Date Admission Type Admitting Provider Attending Provider Department, Room/Bed    10/2/19 Emergency Jaimie Beaver MD Benton, John B, MD [address], [address]    Discharge Date Discharge Disposition Discharge Destination                       Attending Provider:  Volodymyr Gutierrez MD    Allergies:  Penicillins    Isolation:  None   Infection:  None   Code Status:  CPR    Ht:  160 cm (63\")   Wt:  47.5 kg (104 lb 12.8 oz)    Admission Cmt:  None   Principal Problem:  Fall [W19.XXXA]                 Active Insurance as of 10/2/2019     Primary Coverage     Payor Plan Insurance Group Employer/Plan Group    HUMANA MEDICARE REPLACEMENT HUMANA MEDICARE REPL [id number]     Payor Plan Address Payor Plan Phone Number Payor Plan Fax Number Effective Dates    [address] [address] [phone]  1/1/2019 - None Entered    [facility] [address]       Subscriber Name Subscriber Birth Date Member ID       WOOD BELCHER 4/14/1933 [id number]                 Emergency Contacts      (Rel.) Home Phone Work Phone Mobile Phone    kati morales (Daughter) -- -- [phone]    SANTANA MORALES (Daughter) [phone] -- [phone]    JOS SMITH [phone] -- [phone]              "

## 2019-10-04 NOTE — PLAN OF CARE
Problem: Fall Risk (Adult)  Goal: Identify Related Risk Factors and Signs and Symptoms  Outcome: Ongoing (interventions implemented as appropriate)    Goal: Absence of Fall  Outcome: Ongoing (interventions implemented as appropriate)      Problem: Skin Injury Risk (Adult)  Goal: Identify Related Risk Factors and Signs and Symptoms  Outcome: Ongoing (interventions implemented as appropriate)    Goal: Skin Health and Integrity  Outcome: Ongoing (interventions implemented as appropriate)      Problem: Patient Care Overview  Goal: Plan of Care Review  Outcome: Ongoing (interventions implemented as appropriate)   10/04/19 0723   Coping/Psychosocial   Plan of Care Reviewed With patient   Plan of Care Review   Progress no change   OTHER   Outcome Summary Answers questions appropriately, can be confused, easily redirected, up with assist to bedside commode, IVF infusing, VSS, possible DC to SNF when appropriate, will continue to monitor.     Goal: Individualization and Mutuality  Outcome: Ongoing (interventions implemented as appropriate)    Goal: Discharge Needs Assessment  Outcome: Ongoing (interventions implemented as appropriate)      Problem: Activity Intolerance (Adult)  Goal: Identify Related Risk Factors and Signs and Symptoms  Outcome: Outcome(s) achieved Date Met: 10/04/19    Goal: Activity Tolerance  Outcome: Ongoing (interventions implemented as appropriate)

## 2019-10-04 NOTE — PLAN OF CARE
Problem: Patient Care Overview  Goal: Plan of Care Review  Outcome: Ongoing (interventions implemented as appropriate)   10/04/19 0186   Coping/Psychosocial   Plan of Care Reviewed With patient   Plan of Care Review   Progress no change   OTHER   Outcome Summary Answers orientation questions appropriately but is confused on recent events. Up to [facility] for BM, purewick in place for voiding. IVF currently infusing. NSR on telemetry. Plan to SNF for strength conditioning at d/c.

## 2019-10-04 NOTE — PROGRESS NOTES
"   LOS: 2 days   Patient Care Team:  Laura Reed APRN as PCP - General (Family Medicine)    Chief Complaint: tired    Subjective     Pt is unchanged today. She is still very difficult to deal with. Speech is wandering. She is passive aggressive--starting every statement with \"I don't mean to offend you but\". She second guesses everything she is told.         Subjective:  Symptoms:  Stable.  She reports weakness.  No shortness of breath, malaise, cough, chest pain, headache, chest pressure, anorexia, diarrhea or anxiety.    Diet:  Adequate intake.  No nausea or vomiting.    Activity level: Impaired due to weakness.    Pain:  She reports no pain.        History taken from: patient chart RN    Objective     Vital Signs  Temp:  [97.8 °F (36.6 °C)-98.5 °F (36.9 °C)] 98.5 °F (36.9 °C)  Heart Rate:  [55-72] 70  Resp:  [16-18] 18  BP: (125-162)/(79-91) 126/83    Objective:  General Appearance:  Comfortable and in no acute distress.    Vital signs: (most recent): Blood pressure 126/83, pulse 70, temperature 98.5 °F (36.9 °C), temperature source Oral, resp. rate 18, height 160 cm (63\"), weight 47.5 kg (104 lb 12.8 oz), SpO2 97 %.  Vital signs are normal.  No fever.    Output: Producing urine.    HEENT: Normal HEENT exam.    Lungs:  Normal effort and normal respiratory rate.  Breath sounds clear to auscultation.  She is not in respiratory distress.    Heart: Normal rate.  Regular rhythm.    Abdomen: Abdomen is soft.  Bowel sounds are normal.   There is no abdominal tenderness.     Extremities: There is no dependent edema.    Pulses: Distal pulses are intact.    Neurological: Patient is alert.  Normal strength.  Patient has normal muscle tone.  (Oriented to person).    Pupils:  Pupils are equal, round, and reactive to light.  (Mild left ptosis).    Skin:  Warm and dry.  No rash.             Results Review:     I reviewed the patient's new clinical results.  I reviewed the patient's other test results and agree with the " interpretation  I personally viewed and interpreted the patient's EKG/Telemetry data  Discussed with pt, RN, and Dr. Lang    Results from last 7 days   Lab Units 10/04/19  0645 10/03/19  1558 10/03/19  0615 10/03/19  0026 10/02/19  1224   WBC 10*3/mm3 3.25*  --  2.68*  --  5.05   HEMOGLOBIN g/dL 10.5* 10.7* 11.4* 11.7* 13.9   PLATELETS 10*3/mm3 156  --  162  --  207     Results from last 7 days   Lab Units 10/04/19  0645 10/03/19  0615 10/02/19  1224   SODIUM mmol/L 139 142 144   POTASSIUM mmol/L 4.2 4.5 3.4*   CHLORIDE mmol/L 108* 107 101   CO2 mmol/L 24.1 24.0 28.1   BUN mg/dL 8 6* 9   CREATININE mg/dL 0.56* 0.52* 0.72   CALCIUM mg/dL 7.7* 8.1* 9.5   MAGNESIUM mg/dL 2.0 2.0  --    PHOSPHORUS mg/dL  --  2.7  --    Estimated Creatinine Clearance: 37.9 mL/min (A) (by C-G formula based on SCr of 0.56 mg/dL (L)).    Medication Review: reviewed and adjusted    Assessment/Plan       Fall    Weakness    Myasthenia gravis with exacerbation (CMS/HCC)    Lives alone    Headache    Lymphocytopenia    Sensory ataxia    Idiopathic peripheral neuropathy    Dementia of the Alzheimer's type with late onset without behavioral disturbance (CMS/HCC)    Hypokalemia    Vitamin D deficiency    B12 deficiency    Anemia of chronic disease    Partial rectal prolapse          Plan:   (Appreciate Dr. Lang's attention to pt  Continue Mestinon and watch for GI bleeding  (wonder if her rectal prolapse is source of bleeding that she reports)  No steroids or IVIG indicated  SLP eval fine  PT recommends SNF  APS report filed  Pt will need LTC, this is not going to be easy  All her family lives out of town and are not of much help at present  Plan is for her to go to rehab and hopefully transition back to home with family  Replaced K+  Anemia labs c/w chronic disease, oral iron replacement problematic given her rectal prolapse, will give IV iron while here  Replace B12 IM x 3, then oral at discharge  Start Vit D replacement).       Volodymyr KING  MD Matt  10/04/19  6:50 PM    Time: 30min

## 2019-10-04 NOTE — PROGRESS NOTES
[facility] did follow up with pt. Pt unsure of where she will go upon d/c. CCP working with family. [facility] has nothing to offer at this point and will stop following.

## 2019-10-05 LAB
ANION GAP SERPL CALCULATED.3IONS-SCNC: 10.7 MMOL/L (ref 5–15)
BASOPHILS # BLD AUTO: 0.02 10*3/MM3 (ref 0–0.2)
BASOPHILS NFR BLD AUTO: 0.6 % (ref 0–1.5)
BUN BLD-MCNC: 4 MG/DL (ref 8–23)
BUN/CREAT SERPL: 7.7 (ref 7–25)
CALCIUM SPEC-SCNC: 8.4 MG/DL (ref 8.6–10.5)
CHLORIDE SERPL-SCNC: 106 MMOL/L (ref 98–107)
CO2 SERPL-SCNC: 27.3 MMOL/L (ref 22–29)
CREAT BLD-MCNC: 0.52 MG/DL (ref 0.57–1)
DEPRECATED RDW RBC AUTO: 48.5 FL (ref 37–54)
EOSINOPHIL # BLD AUTO: 0.15 10*3/MM3 (ref 0–0.4)
EOSINOPHIL NFR BLD AUTO: 4.2 % (ref 0.3–6.2)
ERYTHROCYTE [DISTWIDTH] IN BLOOD BY AUTOMATED COUNT: 14.1 % (ref 12.3–15.4)
GFR SERPL CREATININE-BSD FRML MDRD: 112 ML/MIN/1.73
GLUCOSE BLD-MCNC: 85 MG/DL (ref 65–99)
HCT VFR BLD AUTO: 34.1 % (ref 34–46.6)
HGB BLD-MCNC: 11.1 G/DL (ref 12–15.9)
IMM GRANULOCYTES # BLD AUTO: 0.01 10*3/MM3 (ref 0–0.05)
IMM GRANULOCYTES NFR BLD AUTO: 0.3 % (ref 0–0.5)
LYMPHOCYTES # BLD AUTO: 1.38 10*3/MM3 (ref 0.7–3.1)
LYMPHOCYTES NFR BLD AUTO: 38.8 % (ref 19.6–45.3)
MCH RBC QN AUTO: 30.4 PG (ref 26.6–33)
MCHC RBC AUTO-ENTMCNC: 32.6 G/DL (ref 31.5–35.7)
MCV RBC AUTO: 93.4 FL (ref 79–97)
MONOCYTES # BLD AUTO: 0.31 10*3/MM3 (ref 0.1–0.9)
MONOCYTES NFR BLD AUTO: 8.7 % (ref 5–12)
NEUTROPHILS # BLD AUTO: 1.69 10*3/MM3 (ref 1.7–7)
NEUTROPHILS NFR BLD AUTO: 47.4 % (ref 42.7–76)
NRBC BLD AUTO-RTO: 0 /100 WBC (ref 0–0.2)
PLATELET # BLD AUTO: 168 10*3/MM3 (ref 140–450)
PMV BLD AUTO: 10 FL (ref 6–12)
POTASSIUM BLD-SCNC: 4.1 MMOL/L (ref 3.5–5.2)
RBC # BLD AUTO: 3.65 10*6/MM3 (ref 3.77–5.28)
SODIUM BLD-SCNC: 144 MMOL/L (ref 136–145)
WBC NRBC COR # BLD: 3.56 10*3/MM3 (ref 3.4–10.8)

## 2019-10-05 PROCEDURE — 36415 COLL VENOUS BLD VENIPUNCTURE: CPT | Performed by: INTERNAL MEDICINE

## 2019-10-05 PROCEDURE — G0378 HOSPITAL OBSERVATION PER HR: HCPCS

## 2019-10-05 PROCEDURE — 85025 COMPLETE CBC W/AUTO DIFF WBC: CPT | Performed by: INTERNAL MEDICINE

## 2019-10-05 PROCEDURE — 80048 BASIC METABOLIC PNL TOTAL CA: CPT | Performed by: INTERNAL MEDICINE

## 2019-10-05 RX ADMIN — SODIUM CHLORIDE, PRESERVATIVE FREE 10 ML: 5 INJECTION INTRAVENOUS at 09:06

## 2019-10-05 RX ADMIN — SODIUM CHLORIDE, PRESERVATIVE FREE 10 ML: 5 INJECTION INTRAVENOUS at 21:00

## 2019-10-05 RX ADMIN — PYRIDOSTIGMINE BROMIDE 60 MG: 60 TABLET ORAL at 14:00

## 2019-10-05 NOTE — PLAN OF CARE
Problem: Activity Intolerance (Adult)  Intervention: Promote Activity   10/05/19 1529   Activity   Activity Management activity adjusted per tolerance   Daily Care Interventions   Self-Care Promotion safe use of adaptive equipment encouraged       Goal: Activity Tolerance  Outcome: Ongoing (interventions implemented as appropriate)   10/05/19 1529   Activity Intolerance (Adult)   Activity Tolerance making progress toward outcome     Goal: Effective Energy Conservation Techniques  Outcome: Ongoing (interventions implemented as appropriate)   10/05/19 1529   Activity Intolerance (Adult)   Effective Energy Conservation Techniques making progress toward outcome

## 2019-10-05 NOTE — PLAN OF CARE
Problem: Activity Intolerance (Adult)  Intervention: Promote Activity   10/05/19 1528   Activity   Activity Management activity adjusted per tolerance;activity encouraged   Daily Care Interventions   Self-Care Promotion safe use of adaptive equipment encouraged       Goal: Activity Tolerance  Outcome: Ongoing (interventions implemented as appropriate)   10/05/19 1528   Activity Intolerance (Adult)   Activity Tolerance making progress toward outcome     Goal: Effective Energy Conservation Techniques  Outcome: Ongoing (interventions implemented as appropriate)   10/05/19 1528   Activity Intolerance (Adult)   Effective Energy Conservation Techniques making progress toward outcome

## 2019-10-05 NOTE — PLAN OF CARE
Problem: Patient Care Overview  Goal: Plan of Care Review  Outcome: Ongoing (interventions implemented as appropriate)   10/05/19 1533   Coping/Psychosocial   Plan of Care Reviewed With patient     Goal: Individualization and Mutuality  Outcome: Ongoing (interventions implemented as appropriate)   10/05/19 1533   Individualization   Patient Specific Preferences free of falls, understanding of care and medications ordered   Patient Specific Goals (Include Timeframe) patient free of falls by discharge, and having a better understanding of medications and the reasoning for these by discharge   Patient Specific Interventions bed in low position, call light in reach, patient room close to nursing station. Explaination of all procedures and medications given to patient

## 2019-10-05 NOTE — PROGRESS NOTES
"   LOS: 2 days   Patient Care Team:  Laura Reed APRN as PCP - General (Family Medicine)    Chief Complaint: doesn't understand why she's here    Subjective     Refusing everything we try to do for her. Wandering speech. Very passive aggressive--always starts with \"I wouldn't expect you to understand this\" or \"I don't want to offend you but\" or \"I'm sure you're very intelligent but\"        Subjective:  Symptoms:  Stable.  She reports weakness.  No shortness of breath, malaise, cough, chest pain, headache, chest pressure, anorexia, diarrhea or anxiety.    Diet:  Adequate intake.  No nausea or vomiting.    Activity level: Impaired due to weakness.    Pain:  She reports no pain.        History taken from: patient chart RN    Objective     Vital Signs  Temp:  [98 °F (36.7 °C)-98.5 °F (36.9 °C)] 98 °F (36.7 °C)  Heart Rate:  [60-64] 60  Resp:  [16-18] 16  BP: (158-173)/() 158/109    Objective:  General Appearance:  Comfortable and in no acute distress.    Vital signs: (most recent): Blood pressure (!) 158/109, pulse 60, temperature 98 °F (36.7 °C), temperature source Oral, resp. rate 16, height 160 cm (63\"), weight 52.1 kg (114 lb 12.8 oz), SpO2 96 %.  Vital signs are normal.  No fever.    Output: Producing urine.    HEENT: Normal HEENT exam.    Lungs:  Normal effort and normal respiratory rate.  Breath sounds clear to auscultation.  She is not in respiratory distress.    Heart: Normal rate.  Regular rhythm.    Abdomen: Abdomen is soft.  Bowel sounds are normal.   There is no abdominal tenderness.     Extremities: There is no dependent edema.    Pulses: Distal pulses are intact.    Neurological: Patient is alert.  Normal strength.  Patient has normal muscle tone.  (Oriented to person).    Pupils:  Pupils are equal, round, and reactive to light.  (Mild left ptosis).    Skin:  Warm and dry.  No rash.             Results Review:     I reviewed the patient's new clinical results.  I reviewed the patient's other " test results and agree with the interpretation  I personally viewed and interpreted the patient's EKG/Telemetry data  Discussed with pt and RN    Results from last 7 days   Lab Units 10/05/19  0552 10/04/19  0645 10/03/19  1558 10/03/19  0615 10/03/19  0026 10/02/19  1224   WBC 10*3/mm3 3.56 3.25*  --  2.68*  --  5.05   HEMOGLOBIN g/dL 11.1* 10.5* 10.7* 11.4* 11.7* 13.9   PLATELETS 10*3/mm3 168 156  --  162  --  207     Results from last 7 days   Lab Units 10/05/19  0552 10/04/19  0645 10/03/19  0615 10/02/19  1224   SODIUM mmol/L 144 139 142 144   POTASSIUM mmol/L 4.1 4.2 4.5 3.4*   CHLORIDE mmol/L 106 108* 107 101   CO2 mmol/L 27.3 24.1 24.0 28.1   BUN mg/dL 4* 8 6* 9   CREATININE mg/dL 0.52* 0.56* 0.52* 0.72   CALCIUM mg/dL 8.4* 7.7* 8.1* 9.5   MAGNESIUM mg/dL  --  2.0 2.0  --    PHOSPHORUS mg/dL  --   --  2.7  --    Estimated Creatinine Clearance: 41.5 mL/min (A) (by C-G formula based on SCr of 0.52 mg/dL (L)).    Medication Review: reviewed    Assessment/Plan       Fall    Weakness    Myasthenia gravis with exacerbation (CMS/HCC)    Lives alone    Headache    Lymphocytopenia    Sensory ataxia    Idiopathic peripheral neuropathy    Dementia of the Alzheimer's type with late onset without behavioral disturbance (CMS/[facility])    Hypokalemia    Vitamin D deficiency    B12 deficiency    Anemia of chronic disease    Partial rectal prolapse          Plan:   (Appreciate Dr. Lang's attention to pt  Continue Mestinon and watch for GI bleeding  (wonder if her rectal prolapse is source of bleeding that she reports)  No steroids or IVIG indicated  SLP eval fine  PT recommends SNF  APS report filed  Pt will need LTC, this is not going to be easy  All her family lives out of town and are not of much help at present  Plan is for her to go to rehab and hopefully transition back to home with family  Replaced K+  Anemia labs c/w chronic disease, oral iron replacement problematic given her rectal prolapse, will give IV iron while  "here  Replace B12 IM x 3, then oral at discharge  Start Vit D replacement    Pt is currently refusing PT, Anusol, Vit D, B12 injxn, IV Iron, and Mestinon  Not sure what we can offer her  Trying to persuade her to do anything is very difficult bc she won't agree to anything that she doesn't \"understand\", and she understands nothing).       Volodymyr uGtierrez MD  10/05/19  6:56 PM    Time: 20min      "

## 2019-10-05 NOTE — PLAN OF CARE
Problem: Patient Care Overview  Goal: Plan of Care Review  Outcome: Ongoing (interventions implemented as appropriate)    Goal: Individualization and Mutuality  Outcome: Ongoing (interventions implemented as appropriate)   10/05/19 1529   Individualization   Patient Specific Preferences no falls, accepting medications as ordered   Patient Specific Goals (Include Timeframe) no falls by discharge, patient taking medications as ordered   Patient Specific Interventions bed in low position, side rails up x 2, call light in reach, frequent explaination of all procedures, medications, and place and time of present       Problem: Activity Intolerance (Adult)  Goal: Activity Tolerance  Outcome: Ongoing (interventions implemented as appropriate)   10/05/19 1529   Activity Intolerance (Adult)   Activity Tolerance making progress toward outcome     Goal: Effective Energy Conservation Techniques  Outcome: Ongoing (interventions implemented as appropriate)   10/05/19 1529   Activity Intolerance (Adult)   Effective Energy Conservation Techniques making progress toward outcome

## 2019-10-05 NOTE — PLAN OF CARE
Problem: Activity Intolerance (Adult)  Intervention: Promote Activity   10/05/19 1535   Activity   Activity Management activity adjusted per tolerance   Daily Care Interventions   Self-Care Promotion safe use of adaptive equipment encouraged       Goal: Activity Tolerance  Outcome: Ongoing (interventions implemented as appropriate)   10/05/19 1535   Activity Intolerance (Adult)   Activity Tolerance making progress toward outcome     Goal: Effective Energy Conservation Techniques  Outcome: Ongoing (interventions implemented as appropriate)   10/05/19 1535   Activity Intolerance (Adult)   Effective Energy Conservation Techniques making progress toward outcome

## 2019-10-05 NOTE — NURSING NOTE
"This RN has received three telephone calls from the patients daughter Ellen Mejia, in [place]< regarding the patients cat at home. When questioned the patient reports that the cat is taken care of, patient refuses to answer daughters phone calls stating \"Ellen has problems after having lost 2 dogs my leaving them in a hot car\" and gets carried away. After the last phone call Ms Mejia says she is going to call [facility] to check on the cat.  "

## 2019-10-05 NOTE — PLAN OF CARE
Problem: Fall Risk (Adult)  Goal: Identify Related Risk Factors and Signs and Symptoms  Outcome: Ongoing (interventions implemented as appropriate)   10/05/19 7099   Fall Risk (Adult)   Related Risk Factors (Fall Risk) age-related changes   Signs and Symptoms (Fall Risk) presence of risk factors

## 2019-10-05 NOTE — PLAN OF CARE
Problem: Fall Risk (Adult)  Goal: Identify Related Risk Factors and Signs and Symptoms  Outcome: Ongoing (interventions implemented as appropriate)   10/05/19 6614   Fall Risk (Adult)   Related Risk Factors (Fall Risk) age-related changes   Signs and Symptoms (Fall Risk) presence of risk factors

## 2019-10-06 LAB
ANION GAP SERPL CALCULATED.3IONS-SCNC: 11.7 MMOL/L (ref 5–15)
BASOPHILS # BLD AUTO: 0.02 10*3/MM3 (ref 0–0.2)
BASOPHILS NFR BLD AUTO: 0.5 % (ref 0–1.5)
BUN BLD-MCNC: 7 MG/DL (ref 8–23)
BUN/CREAT SERPL: 11.5 (ref 7–25)
CALCIUM SPEC-SCNC: 8.3 MG/DL (ref 8.6–10.5)
CHLORIDE SERPL-SCNC: 103 MMOL/L (ref 98–107)
CO2 SERPL-SCNC: 28.3 MMOL/L (ref 22–29)
CREAT BLD-MCNC: 0.61 MG/DL (ref 0.57–1)
DEPRECATED RDW RBC AUTO: 48.9 FL (ref 37–54)
EOSINOPHIL # BLD AUTO: 0.22 10*3/MM3 (ref 0–0.4)
EOSINOPHIL NFR BLD AUTO: 5.7 % (ref 0.3–6.2)
ERYTHROCYTE [DISTWIDTH] IN BLOOD BY AUTOMATED COUNT: 14.2 % (ref 12.3–15.4)
GFR SERPL CREATININE-BSD FRML MDRD: 93 ML/MIN/1.73
GLUCOSE BLD-MCNC: 85 MG/DL (ref 65–99)
HCT VFR BLD AUTO: 35.4 % (ref 34–46.6)
HGB BLD-MCNC: 11.5 G/DL (ref 12–15.9)
IMM GRANULOCYTES # BLD AUTO: 0.01 10*3/MM3 (ref 0–0.05)
IMM GRANULOCYTES NFR BLD AUTO: 0.3 % (ref 0–0.5)
LYMPHOCYTES # BLD AUTO: 1.15 10*3/MM3 (ref 0.7–3.1)
LYMPHOCYTES NFR BLD AUTO: 29.9 % (ref 19.6–45.3)
MCH RBC QN AUTO: 30.3 PG (ref 26.6–33)
MCHC RBC AUTO-ENTMCNC: 32.5 G/DL (ref 31.5–35.7)
MCV RBC AUTO: 93.2 FL (ref 79–97)
MONOCYTES # BLD AUTO: 0.28 10*3/MM3 (ref 0.1–0.9)
MONOCYTES NFR BLD AUTO: 7.3 % (ref 5–12)
NEUTROPHILS # BLD AUTO: 2.16 10*3/MM3 (ref 1.7–7)
NEUTROPHILS NFR BLD AUTO: 56.3 % (ref 42.7–76)
NRBC BLD AUTO-RTO: 0 /100 WBC (ref 0–0.2)
PLATELET # BLD AUTO: 173 10*3/MM3 (ref 140–450)
PMV BLD AUTO: 9.7 FL (ref 6–12)
POTASSIUM BLD-SCNC: 3.9 MMOL/L (ref 3.5–5.2)
RBC # BLD AUTO: 3.8 10*6/MM3 (ref 3.77–5.28)
SODIUM BLD-SCNC: 143 MMOL/L (ref 136–145)
WBC NRBC COR # BLD: 3.84 10*3/MM3 (ref 3.4–10.8)

## 2019-10-06 PROCEDURE — 96365 THER/PROPH/DIAG IV INF INIT: CPT

## 2019-10-06 PROCEDURE — 36415 COLL VENOUS BLD VENIPUNCTURE: CPT | Performed by: INTERNAL MEDICINE

## 2019-10-06 PROCEDURE — 80048 BASIC METABOLIC PNL TOTAL CA: CPT | Performed by: INTERNAL MEDICINE

## 2019-10-06 PROCEDURE — 25010000002 IRON SUCROSE PER 1 MG: Performed by: HOSPITALIST

## 2019-10-06 PROCEDURE — 85025 COMPLETE CBC W/AUTO DIFF WBC: CPT | Performed by: INTERNAL MEDICINE

## 2019-10-06 PROCEDURE — G0378 HOSPITAL OBSERVATION PER HR: HCPCS

## 2019-10-06 RX ADMIN — IRON SUCROSE 200 MG: 20 INJECTION, SOLUTION INTRAVENOUS at 20:10

## 2019-10-06 RX ADMIN — SODIUM CHLORIDE, PRESERVATIVE FREE 10 ML: 5 INJECTION INTRAVENOUS at 10:10

## 2019-10-06 RX ADMIN — SODIUM CHLORIDE, PRESERVATIVE FREE 10 ML: 5 INJECTION INTRAVENOUS at 20:10

## 2019-10-06 NOTE — PLAN OF CARE
Problem: Patient Care Overview  Goal: Plan of Care Review  Outcome: Ongoing (interventions implemented as appropriate)   10/06/19 1602   Coping/Psychosocial   Plan of Care Reviewed With patient     Goal: Individualization and Mutuality   10/06/19 1602   Individualization   Patient Specific Preferences no falls, patient discharged without problems.   Patient Specific Goals (Include Timeframe) no falls by discharge. All problems addressed before discharge   Patient Specific Interventions catalino manager involved in care, daughters involved, bed in low position, close to nurses station and call light in reach       Problem: Activity Intolerance (Adult)  Goal: Activity Tolerance  Outcome: Ongoing (interventions implemented as appropriate)   10/06/19 1602   Activity Intolerance (Adult)   Activity Tolerance making progress toward outcome

## 2019-10-06 NOTE — PLAN OF CARE
Problem: Activity Intolerance (Adult)  Intervention: Promote Activity   10/06/19 1602   Activity   Activity Management activity adjusted per tolerance;up to bedside commode   Daily Care Interventions   Self-Care Promotion safe use of adaptive equipment encouraged       Goal: Activity Tolerance  Outcome: Ongoing (interventions implemented as appropriate)   10/06/19 1602   Activity Intolerance (Adult)   Activity Tolerance making progress toward outcome     Goal: Effective Energy Conservation Techniques  Outcome: Ongoing (interventions implemented as appropriate)   10/06/19 1602   Activity Intolerance (Adult)   Effective Energy Conservation Techniques making progress toward outcome

## 2019-10-06 NOTE — SIGNIFICANT NOTE
Pt encouraged to participate with therapy and unwilling to cooperate with therapy request with no specific reasons given to no working with PT.

## 2019-10-06 NOTE — PLAN OF CARE
Problem: Skin Injury Risk (Adult)  Intervention: Promote/Optimize Nutrition   10/06/19 1606   Nutrition Interventions   Oral Nutrition Promotion nutritional therapy counseling provided;referred to outpatient services;safe use of adaptive equipment encouraged     Intervention: Prevent/Manage Excess Moisture   10/06/19 1606   Hygiene Care   Perineal Care absorbent pad changed;catheter care provided;perineum cleansed   Bathing/Skin Care incontinence care;linen changed   Skin Interventions   Skin Protection incontinence pads utilized       Goal: Skin Health and Integrity  Outcome: Ongoing (interventions implemented as appropriate)   10/06/19 1606   Skin Injury Risk (Adult)   Skin Health and Integrity making progress toward outcome

## 2019-10-06 NOTE — PLAN OF CARE
Problem: Skin Injury Risk (Adult)  Intervention: Promote/Optimize Nutrition   10/06/19 1605   Nutrition Interventions   Oral Nutrition Promotion nutritional therapy counseling provided;referred to outpatient services;safe use of adaptive equipment encouraged     Intervention: Prevent/Manage Excess Moisture   10/06/19 1605   Hygiene Care   Perineal Care absorbent pad changed;perineum cleansed;diaper changed;protective cream applied   Bathing/Skin Care incontinence care   Skin Interventions   Skin Protection pectin skin barriers applied;incontinence pads utilized       Goal: Identify Related Risk Factors and Signs and Symptoms  Outcome: Ongoing (interventions implemented as appropriate)   10/06/19 1605   Skin Injury Risk (Adult)   Related Risk Factors (Skin Injury Risk) advanced age;mobility impaired     Goal: Skin Health and Integrity  Outcome: Ongoing (interventions implemented as appropriate)   10/06/19 1605   Skin Injury Risk (Adult)   Skin Health and Integrity making progress toward outcome       Problem: Patient Care Overview  Goal: Plan of Care Review  Outcome: Ongoing (interventions implemented as appropriate)   10/06/19 1605   Coping/Psychosocial   Plan of Care Reviewed With patient

## 2019-10-06 NOTE — PLAN OF CARE
Problem: Activity Intolerance (Adult)  Intervention: Promote Activity   10/06/19 1601   Activity   Activity Management activity adjusted per tolerance;up to bedside commode   Daily Care Interventions   Self-Care Promotion safe use of adaptive equipment encouraged       Goal: Activity Tolerance  Outcome: Ongoing (interventions implemented as appropriate)   10/06/19 1601   Activity Intolerance (Adult)   Activity Tolerance making progress toward outcome     Goal: Effective Energy Conservation Techniques  Outcome: Ongoing (interventions implemented as appropriate)   10/06/19 1601   Activity Intolerance (Adult)   Effective Energy Conservation Techniques making progress toward outcome

## 2019-10-06 NOTE — PLAN OF CARE
Problem: Activity Intolerance (Adult)  Intervention: Promote Activity   10/06/19 1600   Activity   Activity Management activity adjusted per tolerance;up to bedside commode   Daily Care Interventions   Self-Care Promotion safe use of adaptive equipment encouraged       Goal: Activity Tolerance  Outcome: Ongoing (interventions implemented as appropriate)   10/06/19 1600   Activity Intolerance (Adult)   Activity Tolerance making progress toward outcome     Goal: Effective Energy Conservation Techniques  Outcome: Ongoing (interventions implemented as appropriate)   10/06/19 1600   Activity Intolerance (Adult)   Effective Energy Conservation Techniques making progress toward outcome

## 2019-10-06 NOTE — PROGRESS NOTES
"   LOS: 2 days   Patient Care Team:  Laura Reed APRN as PCP - General (Family Medicine)    Chief Complaint: tired    Subjective     Refusing everything we try to do for her. Wandering speech. Very passive aggressive--always starts with \"I wouldn't expect you to understand this\" or \"I don't want to offend you but\" or \"I'm sure you're very intelligent but\"        Subjective:  Symptoms:  Stable.  She reports weakness.  No shortness of breath, malaise, cough, chest pain, headache, chest pressure, anorexia, diarrhea or anxiety.    Diet:  Adequate intake.  No nausea or vomiting.    Activity level: Impaired due to weakness.    Pain:  She reports no pain.        History taken from: patient chart RN    Objective     Vital Signs  Temp:  [97.9 °F (36.6 °C)-98.3 °F (36.8 °C)] 98.3 °F (36.8 °C)  Heart Rate:  [56-71] 67  Resp:  [16] 16  BP: (146-175)/() 170/76    Objective:  General Appearance:  Comfortable and in no acute distress.    Vital signs: (most recent): Blood pressure 170/76, pulse 67, temperature 98.3 °F (36.8 °C), temperature source Oral, resp. rate 16, height 160 cm (63\"), weight 53.5 kg (117 lb 15.1 oz), SpO2 95 %.  Vital signs are normal.  No fever.    Output: Producing urine and producing stool.    HEENT: Normal HEENT exam.    Lungs:  Normal effort and normal respiratory rate.  Breath sounds clear to auscultation.  She is not in respiratory distress.    Heart: Normal rate.  Regular rhythm.    Abdomen: Abdomen is soft.  Bowel sounds are normal.   There is no abdominal tenderness.     Extremities: There is no dependent edema.    Pulses: Distal pulses are intact.    Neurological: Patient is alert.  Normal strength.  Patient has normal muscle tone.  (Oriented to person).    Pupils:  Pupils are equal, round, and reactive to light.  (Mild left ptosis).    Skin:  Warm and dry.  No rash.             Results Review:     I reviewed the patient's new clinical results.  I reviewed the patient's other test results " and agree with the interpretation  I personally viewed and interpreted the patient's EKG/Telemetry data  Discussed with pt and RN    Results from last 7 days   Lab Units 10/06/19  0604 10/05/19  0552 10/04/19  0645 10/03/19  1558 10/03/19  0615 10/03/19  0026 10/02/19  1224   WBC 10*3/mm3 3.84 3.56 3.25*  --  2.68*  --  5.05   HEMOGLOBIN g/dL 11.5* 11.1* 10.5* 10.7* 11.4* 11.7* 13.9   PLATELETS 10*3/mm3 173 168 156  --  162  --  207     Results from last 7 days   Lab Units 10/06/19  0603 10/05/19  0552 10/04/19  0645 10/03/19  0615 10/02/19  1224   SODIUM mmol/L 143 144 139 142 144   POTASSIUM mmol/L 3.9 4.1 4.2 4.5 3.4*   CHLORIDE mmol/L 103 106 108* 107 101   CO2 mmol/L 28.3 27.3 24.1 24.0 28.1   BUN mg/dL 7* 4* 8 6* 9   CREATININE mg/dL 0.61 0.52* 0.56* 0.52* 0.72   CALCIUM mg/dL 8.3* 8.4* 7.7* 8.1* 9.5   MAGNESIUM mg/dL  --   --  2.0 2.0  --    PHOSPHORUS mg/dL  --   --   --  2.7  --    Estimated Creatinine Clearance: 42.6 mL/min (by C-G formula based on SCr of 0.61 mg/dL).    Medication Review: reviewed    Assessment/Plan       Fall    Weakness    Myasthenia gravis with exacerbation (CMS/HCC)    Lives alone    Headache    Lymphocytopenia    Sensory ataxia    Idiopathic peripheral neuropathy    Dementia of the Alzheimer's type with late onset without behavioral disturbance (CMS/HCC)    Hypokalemia    Vitamin D deficiency    B12 deficiency    Anemia of chronic disease    Partial rectal prolapse          Plan:   (Appreciate Dr. Lang's attention to pt  Continue Mestinon and watch for GI bleeding  (wonder if her rectal prolapse is source of bleeding that she reports)  No steroids or IVIG indicated  SLP eval fine  PT recommends SNF  APS report filed  Pt will need LTC unless family can live with her  All her family lives out of town and are not of much help at present  Plan is for her to go to rehab and hopefully transition back to home with family  Replaced K+  Anemia labs c/w chronic disease, oral iron  "replacement problematic given her rectal prolapse, will give IV iron while here  Replace B12 IM x 3, then oral at discharge  Start Vit D replacement    Pt is currently refusing Anusol, Vit D, B12 injxn, IV Iron, and Mestinon  Not sure what we can offer her  Trying to persuade her to do anything is very difficult bc she won't agree to anything that she doesn't \"understand\", and she understands nothing  She is pleasant today, but confused, talks in circles, very concerned about how much everything costs.).       Volodymyr Gutierrez MD  10/06/19  3:04 PM    Time: 20min      "

## 2019-10-06 NOTE — PLAN OF CARE
Problem: Fall Risk (Adult)  Goal: Identify Related Risk Factors and Signs and Symptoms  Outcome: Ongoing (interventions implemented as appropriate)   10/06/19 8112   Fall Risk (Adult)   Related Risk Factors (Fall Risk) bladder function altered;sleep pattern alteration;environment unfamiliar   Signs and Symptoms (Fall Risk) presence of risk factors

## 2019-10-07 VITALS
BODY MASS INDEX: 19.44 KG/M2 | SYSTOLIC BLOOD PRESSURE: 120 MMHG | TEMPERATURE: 98.2 F | RESPIRATION RATE: 16 BRPM | WEIGHT: 109.7 LBS | HEART RATE: 66 BPM | HEIGHT: 63 IN | DIASTOLIC BLOOD PRESSURE: 88 MMHG | OXYGEN SATURATION: 94 %

## 2019-10-07 PROBLEM — E87.6 HYPOKALEMIA: Status: RESOLVED | Noted: 2019-10-03 | Resolved: 2019-10-07

## 2019-10-07 PROBLEM — R51.9 HEADACHE: Status: RESOLVED | Noted: 2019-10-02 | Resolved: 2019-10-07

## 2019-10-07 LAB
ANION GAP SERPL CALCULATED.3IONS-SCNC: 11.3 MMOL/L (ref 5–15)
BASOPHILS # BLD AUTO: 0.03 10*3/MM3 (ref 0–0.2)
BASOPHILS NFR BLD AUTO: 0.8 % (ref 0–1.5)
BUN BLD-MCNC: 6 MG/DL (ref 8–23)
BUN/CREAT SERPL: 11.8 (ref 7–25)
CALCIUM SPEC-SCNC: 8.3 MG/DL (ref 8.6–10.5)
CHLORIDE SERPL-SCNC: 101 MMOL/L (ref 98–107)
CO2 SERPL-SCNC: 26.7 MMOL/L (ref 22–29)
CREAT BLD-MCNC: 0.51 MG/DL (ref 0.57–1)
DEPRECATED RDW RBC AUTO: 48.6 FL (ref 37–54)
EOSINOPHIL # BLD AUTO: 0.21 10*3/MM3 (ref 0–0.4)
EOSINOPHIL NFR BLD AUTO: 5.6 % (ref 0.3–6.2)
ERYTHROCYTE [DISTWIDTH] IN BLOOD BY AUTOMATED COUNT: 14.2 % (ref 12.3–15.4)
GFR SERPL CREATININE-BSD FRML MDRD: 114 ML/MIN/1.73
GLUCOSE BLD-MCNC: 92 MG/DL (ref 65–99)
HCT VFR BLD AUTO: 35.4 % (ref 34–46.6)
HGB BLD-MCNC: 11.6 G/DL (ref 12–15.9)
IMM GRANULOCYTES # BLD AUTO: 0 10*3/MM3 (ref 0–0.05)
IMM GRANULOCYTES NFR BLD AUTO: 0 % (ref 0–0.5)
LYMPHOCYTES # BLD AUTO: 1.18 10*3/MM3 (ref 0.7–3.1)
LYMPHOCYTES NFR BLD AUTO: 31.6 % (ref 19.6–45.3)
MCH RBC QN AUTO: 30.3 PG (ref 26.6–33)
MCHC RBC AUTO-ENTMCNC: 32.8 G/DL (ref 31.5–35.7)
MCV RBC AUTO: 92.4 FL (ref 79–97)
MONOCYTES # BLD AUTO: 0.37 10*3/MM3 (ref 0.1–0.9)
MONOCYTES NFR BLD AUTO: 9.9 % (ref 5–12)
NEUTROPHILS # BLD AUTO: 1.94 10*3/MM3 (ref 1.7–7)
NEUTROPHILS NFR BLD AUTO: 52.1 % (ref 42.7–76)
NRBC BLD AUTO-RTO: 0 /100 WBC (ref 0–0.2)
PLATELET # BLD AUTO: 181 10*3/MM3 (ref 140–450)
PMV BLD AUTO: 9.6 FL (ref 6–12)
POTASSIUM BLD-SCNC: 3.5 MMOL/L (ref 3.5–5.2)
RBC # BLD AUTO: 3.83 10*6/MM3 (ref 3.77–5.28)
SODIUM BLD-SCNC: 139 MMOL/L (ref 136–145)
WBC NRBC COR # BLD: 3.73 10*3/MM3 (ref 3.4–10.8)

## 2019-10-07 PROCEDURE — 80048 BASIC METABOLIC PNL TOTAL CA: CPT | Performed by: INTERNAL MEDICINE

## 2019-10-07 PROCEDURE — G0378 HOSPITAL OBSERVATION PER HR: HCPCS

## 2019-10-07 PROCEDURE — 85025 COMPLETE CBC W/AUTO DIFF WBC: CPT | Performed by: INTERNAL MEDICINE

## 2019-10-07 RX ORDER — DOCUSATE SODIUM 100 MG/1
100 CAPSULE, LIQUID FILLED ORAL 2 TIMES DAILY PRN
Qty: 60 CAPSULE | Refills: 0
Start: 2019-10-07

## 2019-10-07 RX ORDER — BISACODYL 10 MG
10 SUPPOSITORY, RECTAL RECTAL DAILY PRN
Qty: 28 EACH | Refills: 0
Start: 2019-10-07

## 2019-10-07 RX ORDER — FERROUS SULFATE 325(65) MG
325 TABLET ORAL
Qty: 30 TABLET | Refills: 0
Start: 2019-10-07

## 2019-10-07 RX ORDER — LANOLIN ALCOHOL/MO/W.PET/CERES
1000 CREAM (GRAM) TOPICAL DAILY
Qty: 30 TABLET | Refills: 0
Start: 2019-10-07

## 2019-10-07 NOTE — PROGRESS NOTES
[facility]    Physicians Statement of Medical Necessity for Ambulance Transportation    It is medically necessary for:    Patient Name: Wendi Fofana    Insurance Information:  [employer] Medicare Replacement    To be transported by ambulance:    From (if nursing facility, specify level of care: skilled, FPC, etc):   [facility]     To (specify level of care if nursing facility):   [facility]    Date of Service:  10/7/2019      For dialysis patients state date dialysis began:     Diagnosis: FALL/ MYASTHENIA GRAVIS  EXACERBATION.    Past Medical/Surgical History:  Past Medical History:   Diagnosis Date   • Arthritis    • Dementia (CMS/HCC)    • Memory loss    • Myasthenia gravis (CMS/HCC)    • Vision loss       Past Surgical History:   Procedure Laterality Date   • CYST REMOVAL     • TONSILLECTOMY     • WISDOM TOOTH EXTRACTION          Current Objective Medical Evidence(including physical exam finding to support reason for limitations):    Immobilization syndrome  Confused/combative: may require restraints    Other:     Physician Signature:           (RN,NP,PA,CAN, Discharge Planner) BRUCE KAUFMAN RN/CCP Date/Time: 10/7/2019      Printed Name:  BRUCE KAUFMAN RN/CCP  __________________________________    [facility]   Phone: [phone] Phone: [phone]   Fax: [phone] Fax: [phone]

## 2019-10-07 NOTE — SIGNIFICANT NOTE
10/07/19 1339   PT Discharge Summary   Reason for Discharge Discharge from facility   Discharge Destination SNF

## 2019-10-07 NOTE — NURSING NOTE
Attempted restart on iv, pt refuses, states I don't make any sense and she does not understand why she needs one. I explained to her meds needed via iv and importance of having one in case of emergency. Pt refused

## 2019-10-07 NOTE — PLAN OF CARE
Problem: Skin Injury Risk (Adult)  Intervention: Promote/Optimize Nutrition   10/07/19 1217   Nutrition Interventions   Oral Nutrition Promotion nutritional therapy counseling provided     Intervention: Prevent/Manage Excess Moisture   10/07/19 1217   Hygiene Care   Perineal Care absorbent pad changed;diaper changed;perineum cleansed   Bathing/Skin Care incontinence care;linen changed   Skin Interventions   Skin Protection incontinence pads utilized       Goal: Identify Related Risk Factors and Signs and Symptoms  Outcome: Ongoing (interventions implemented as appropriate)    Goal: Skin Health and Integrity  Outcome: Ongoing (interventions implemented as appropriate)   10/07/19 1217   Skin Injury Risk (Adult)   Skin Health and Integrity making progress toward outcome

## 2019-10-07 NOTE — PROGRESS NOTES
Continued Stay Note  [facility]     Patient Name: Wendi Fofana  MRN: [id number]  Today's Date: 10/7/2019    Admit Date: 10/2/2019    Discharge Plan     Row Name 10/07/19 1259       Plan    Plan Comments  [facility] approved and [facility] has bed aval today.  [facility] spoke with Karen Fabio ([phone]) to update her on dc.  Pt has no family local to transport, and needs assist x2 to transfer.  Ambulance arranged for 3pm. Suzette Alvarez RN        Discharge Codes    No documentation.       Expected Discharge Date and Time     Expected Discharge Date Expected Discharge Time    Oct 7, 2019             Suzette Alvarez RN

## 2019-10-07 NOTE — PLAN OF CARE
Problem: Fall Risk (Adult)  Goal: Identify Related Risk Factors and Signs and Symptoms  Outcome: Ongoing (interventions implemented as appropriate)   10/07/19 1218   Fall Risk (Adult)   Related Risk Factors (Fall Risk) confusion/agitation;gait/mobility problems;history of falls

## 2019-10-07 NOTE — PLAN OF CARE
Problem: Patient Care Overview  Goal: Plan of Care Review  Outcome: Ongoing (interventions implemented as appropriate)   10/07/19 1214   Coping/Psychosocial   Plan of Care Reviewed With patient     Goal: Individualization and Mutuality  Outcome: Ongoing (interventions implemented as appropriate)   10/07/19 1214   Individualization   Patient Specific Preferences no falls, better under of hospital stay and medication by discharge   Patient Specific Goals (Include Timeframe) no falls by discharge, verbalize understanding of stay by discharge   Patient Specific Interventions bed in low position, call light in reach, room close to nursing station, teaching of hosptial stay

## 2019-10-07 NOTE — DISCHARGE PLACEMENT REQUEST
"Wood Belcher (86 y.o. Female)     Date of Birth Social Security Number Address Home Phone MRN    04/14/1933  [id number]   [facility] [address] [phone] [id number]    Gnosticist Marital Status          None        Admission Date Admission Type Admitting Provider Attending Provider Department, Room/Bed    10/2/19 Emergency Jaimie Beaver MD Benton, John B, MD [address], [address]    Discharge Date Discharge Disposition Discharge Destination         Skilled Nursing Facility (DC - External)              Attending Provider:  Volodymyr Gutierrez MD    Allergies:  Penicillins    Isolation:  None   Infection:  None   Code Status:  CPR    Ht:  160 cm (63\")   Wt:  49.8 kg (109 lb 11.2 oz)    Admission Cmt:  None   Principal Problem:  Fall [W19.XXXA]                 Active Insurance as of 10/2/2019     Primary Coverage     Payor Plan Insurance Group Employer/Plan Group    HU[employer]NA MEDICARE REPLACEMENT HU[employer]NA MEDICARE REPL [id number]     Payor Plan Address Payor Plan Phone Number Payor Plan Fax Number Effective Dates    [address] [address] [phone]  1/1/2019 - None Entered    [facility] [address]       Subscriber Name Subscriber Birth Date Member ID       WOOD BELCHER 4/14/1933 [id number]                 Emergency Contacts      (Rel.) Home Phone Work Phone Mobile Phone    kati morales (Daughter) -- -- [phone]    CARMENZA SANTANA (Daughter) [phone] -- [phone]    JOS SMITH [phone] -- [phone]                 Discharge Summary      Buffy Beaver APRN at 10/07/19 1040                              [facility]    Date of Discharge:  10/7/2019    PCP: Laura Reed APRN    Discharge Diagnosis:   Active Hospital Problems    Diagnosis  POA   • **Fall [W19.XXXA]  Yes   • Vitamin D deficiency [E55.9]  Yes   • B12 deficiency [E53.8]  Yes   • Anemia of chronic disease [D63.8]  Yes   • Partial rectal prolapse " [K62.3]  Yes   • Lymphocytopenia [D72.810]  No   • 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]  Yes   • Idiopathic peripheral neuropathy [G60.9]  Yes   • Dementia of the Alzheimer's type with late onset without behavioral disturbance (CMS/HCC) [G30.1, F02.80]  Yes   • Myasthenia gravis with exacerbation (CMS/HCC) [G70.01]  Yes   • Lives alone [Z60.2]  Not Applicable   • Weakness [R53.1]  Yes      Resolved Hospital Problems    Diagnosis Date Resolved POA   • Hypokalemia [E87.6] 10/07/2019 Yes   • Headache [R51] 10/07/2019 Yes     Procedures Performed   N/A     Consults     Date and Time Order Name Status Description    10/2/2019 2027 Inpatient Neurology Consult General Completed     10/2/2019 1610 [facility] (on-call MD unless specified) Details Completed         Hospital Course  Please see history and physical for details. Patient is a 86 y.o. female admitted following a fall at home.  She has a history of myasthenia gravis, dementia, and psychiatric disorder but lives at home alone with family primarily out of town.  Work-up in the ER was unremarkable including CT head except for moderate small vessel disease but no acute intracranial abnormality.  She was evaluated by neurology and cleared to continue Mestinon with no further work-up.  She passed her swallow evaluation with SLP.  Labs demonstrate anemia consistent with chronic disease and iron deficiency anemia.  She also has slight rectal bleeding due to prolapse/hemorrhoids. Hgb remains stable. Patient has remained confused and talks in circles stating she has difficulty understanding the reason for her admission.  She has refused pretty much all care including peripheral IV replacement, Anusol, vitamin D, B12 injections, IV iron, Mestinon, and physical therapy this admission. Neurology, psychiatry, and physical therapy recommends long-term care.  At this time she will be discharged to rehab and ideally to long term care facility if family is unable to care for her at home.  She is stable  for discharge today.       I discussed the patient's findings and my recommendations with patient, nursing staff and CCP who confirms she has reviewed plan with daughter. .    Condition on Discharge: Improved.     Temp:  [97.7 °F (36.5 °C)-98.5 °F (36.9 °C)] 98.5 °F (36.9 °C)  Heart Rate:  [58-72] 72  Resp:  [16-18] 18  BP: (121-170)/() 121/102  Body mass index is 19.43 kg/m².    Physical Exam   Constitutional: She appears well-developed and well-nourished. No distress.   HENT:   Head: Normocephalic and atraumatic.   Cardiovascular: Normal rate, regular rhythm and normal heart sounds.   Pulmonary/Chest: Effort normal and breath sounds normal. No respiratory distress.   Abdominal: Soft. Bowel sounds are normal. She exhibits no distension and no mass. There is no tenderness. No hernia.   Neurological: She is alert.   Oriented to person and date but not situation or place   Skin: Skin is warm and dry.   Psychiatric: Her speech is normal. Her affect is blunt. Cognition and memory are impaired. She expresses impulsivity and inappropriate judgment.   Vitals reviewed.       Discharge Medications      New Medications      Instructions Start Date   bisacodyl 10 MG suppository  Commonly known as:  DULCOLAX   10 mg, Rectal, Daily PRN      Cholecalciferol 1000 units tablet   1,000 Units, Oral, Daily      docusate sodium 100 MG capsule  Commonly known as:  COLACE   100 mg, Oral, 2 Times Daily PRN      ferrous sulfate 325 (65 FE) MG tablet  Commonly known as:  FERROUSUL   325 mg, Oral, Daily With Breakfast      hydrocortisone 2.5 % rectal cream  Commonly known as:  ANUSOL-HC   1 application, Rectal, 2 Times Daily      vitamin B-12 1000 MCG tablet  Commonly known as:  CYANOCOBALAMIN   1,000 mcg, Oral, Daily         Continue These Medications      Instructions Start Date   aspirin 81 MG EC tablet   81 mg, Oral, Daily      pyridostigmine 60 MG tablet  Commonly known as:  MESTINON   60 mg, Oral, 3 Times Daily            Diet  Instructions     Diet: Regular      Discharge Diet:  Regular         Activity Instructions     Activity as Tolerated           Additional Instructions for the Follow-ups that You Need to Schedule     Discharge Follow-up with PCP   As directed       Currently Documented PCP:    Laura Reed APRN    PCP Phone Number:    [phone]     Follow Up Details:  1 to 2 weeks (or sooner if problems)           Follow-up Information     Laura Reed APRN Follow up.    Specialty:  Family Medicine  Why:  1 to 2 weeks (or sooner if problems)  Contact information:  [address] [place] [id number] [phone]                 Test Results Pending at Discharge- none      SHAYY Fisher  10/07/19  10:40 AM    Discharge time spent greater than 35 minutes.    Electronically signed by Buffy Beaver APRN at 10/07/19 1051       Discharge Order (From admission, onward)    Start     Ordered    10/07/19 1035  Discharge patient  Once     Expected Discharge Date:  10/07/19    Expected Discharge Time:  Midday    Discharge Disposition:  Skilled Nursing Facility (DC - External)    Physician of Record for Attribution - Please select from Treatment Team:  ELLI GONZALEZ [[id number]]    Review needed by CMO to determine Physician of Record:  No       Question Answer Comment   Physician of Record for Attribution - Please select from Treatment Team ELLI GONZALEZ    Review needed by CMO to determine Physician of Record No        10/07/19 9862

## 2019-10-07 NOTE — DISCHARGE SUMMARY
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]  Yes   • Idiopathic peripheral neuropathy [G60.9]  Yes   • Dementia of the Alzheimer's type with late onset without behavioral disturbance (CMS/HCC) [G30.1, F02.80]  Yes   • Myasthenia gravis with exacerbation (CMS/HCC) [G70.01]  Yes   • Lives alone [Z60.2]  Not Applicable   • Weakness [R53.1]  Yes      Resolved Hospital Problems    Diagnosis Date Resolved POA   • Hypokalemia [E87.6] 10/07/2019 Yes   • Headache [R51] 10/07/2019 Yes     Procedures Performed   N/A     Consults     Date and Time Order Name Status Description    10/2/2019 2027 Inpatient Neurology Consult General Completed     10/2/2019 1610 [facility] (on-call MD unless specified) Details Completed         Hospital Course  Please see history and physical for details. Patient is a 86 y.o. female admitted following a fall at home.  She has a history of myasthenia gravis, dementia, and psychiatric disorder but lives at home alone with family primarily out of town.  Work-up in the ER was unremarkable including CT head except for moderate small vessel disease but no acute intracranial abnormality.  She was evaluated by neurology and cleared to continue Mestinon with no further work-up.  She passed her swallow evaluation with SLP.  Labs demonstrate anemia consistent with chronic disease and iron deficiency anemia.  She also has slight rectal bleeding due to prolapse/hemorrhoids. Hgb remains stable. Patient has remained confused and talks in circles stating she has difficulty understanding the reason for her admission.  She has  refused pretty much all care including peripheral IV replacement, Anusol, vitamin D, B12 injections, IV iron, Mestinon, and physical therapy this admission. Neurology, psychiatry, and physical therapy recommends long-term care.  At this time she will be discharged to rehab and ideally to long term care facility if family is unable to care for her at home.  She is stable for discharge today.       I discussed the patient's findings and my recommendations with patient, nursing staff and CCP who confirms she has reviewed plan with daughter. .    Condition on Discharge: Improved.     Temp:  [97.7 °F (36.5 °C)-98.5 °F (36.9 °C)] 98.5 °F (36.9 °C)  Heart Rate:  [58-72] 72  Resp:  [16-18] 18  BP: (121-170)/() 121/102  Body mass index is 19.43 kg/m².    Physical Exam   Constitutional: She appears well-developed and well-nourished. No distress.   HENT:   Head: Normocephalic and atraumatic.   Cardiovascular: Normal rate, regular rhythm and normal heart sounds.   Pulmonary/Chest: Effort normal and breath sounds normal. No respiratory distress.   Abdominal: Soft. Bowel sounds are normal. She exhibits no distension and no mass. There is no tenderness. No hernia.   Neurological: She is alert.   Oriented to person and date but not situation or place   Skin: Skin is warm and dry.   Psychiatric: Her speech is normal. Her affect is blunt. Cognition and memory are impaired. She expresses impulsivity and inappropriate judgment.   Vitals reviewed.       Discharge Medications      New Medications      Instructions Start Date   bisacodyl 10 MG suppository  Commonly known as:  DULCOLAX   10 mg, Rectal, Daily PRN      Cholecalciferol 1000 units tablet   1,000 Units, Oral, Daily      docusate sodium 100 MG capsule  Commonly known as:  COLACE   100 mg, Oral, 2 Times Daily PRN      ferrous sulfate 325 (65 FE) MG tablet  Commonly known as:  FERROUSUL   325 mg, Oral, Daily With Breakfast      hydrocortisone 2.5 % rectal cream  Commonly  known as:  ANUSOL-HC   1 application, Rectal, 2 Times Daily      vitamin B-12 1000 MCG tablet  Commonly known as:  CYANOCOBALAMIN   1,000 mcg, Oral, Daily         Continue These Medications      Instructions Start Date   aspirin 81 MG EC tablet   81 mg, Oral, Daily      pyridostigmine 60 MG tablet  Commonly known as:  MESTINON   60 mg, Oral, 3 Times Daily            Diet Instructions     Diet: Regular      Discharge Diet:  Regular         Activity Instructions     Activity as Tolerated           Additional Instructions for the Follow-ups that You Need to Schedule     Discharge Follow-up with PCP   As directed       Currently Documented PCP:    Laura Reed APRN    PCP Phone Number:    [phone]     Follow Up Details:  1 to 2 weeks (or sooner if problems)           Follow-up Information     Laura Reed APRN Follow up.    Specialty:  Family Medicine  Why:  1 to 2 weeks (or sooner if problems)  Contact information:  [place] [address] [facility] [id number] [phone]                 Test Results Pending at Discharge- none      SHAYY Fisher  10/07/19  10:40 AM    Discharge time spent greater than 35 minutes.

## 2019-10-07 NOTE — PLAN OF CARE
Problem: Patient Care Overview  Goal: Plan of Care Review  Outcome: Ongoing (interventions implemented as appropriate)  Pt alert to self, place, and situation. She denies pain, discomfort, or shortness of breath at this time. Pt guarded, questions all medications and is currently refusing all by mouth meds

## 2019-11-19 ENCOUNTER — TELEPHONE (OUTPATIENT)
Dept: NEUROLOGY | Facility: CLINIC | Age: 84
End: 2019-11-19

## 2019-11-19 NOTE — TELEPHONE ENCOUNTER
----- Message from Maria M Morgan sent at 11/19/2019 11:40 AM EST -----  Contact: [phone]  Ellen (pt daughter) called with concerns. Her mother was diagnosed with myasthenia. Daughter reports blood in her stool when her mother takes  Pyridostigmine. She states the bleeding stops when she discontinues medication. She is asking to try a different medication. She is requesting Neostigmine. Would you like patient to come in tomorrow?

## 2019-11-19 NOTE — TELEPHONE ENCOUNTER
Looks like I saw this patient back in March.  Mestinon should not cause bloody stools.  Has that been going on this whole time?  It can cause loose stools or diarrhea.  Looks like I ordered a CT of her chest and an EMG, but I don't see those results.  The plan was for her to f/u with Dr. Payne.  She may want to follow up with her PCP or GI about the blood in her stool. I don't think neostigmine is used.  It is an injection to reverse paralyzing drugs in the hospital.

## 2020-03-03 ENCOUNTER — TELEPHONE (OUTPATIENT)
Dept: NEUROLOGY | Facility: CLINIC | Age: 85
End: 2020-03-03

## 2020-03-03 NOTE — TELEPHONE ENCOUNTER
Provider: DR. SHEPARD  Caller: MORGAN FROM [facility]    Phone Number: [phone]  Fax number: [phone]    Reason for Call: [facility] NEEDS THE LAST OFFICE VISIT NOTE FAXED TO THEM [phone] AND THE LAST DATE THAT I SEE WAS ON 3- WITH DR. SHEPARD.

## 2020-10-19 NOTE — TELEPHONE ENCOUNTER
PT'S DAUGHTER JOS WANTED TO ADD THAT THE MEDICATION HURTS PT'S STOMACH AND MAKES HER NAUSEATED WHEN SHE TAKES IT SO SHE DOESN'T TAKE IT REGULARLY BUT WHEN SHE DOES TAKE IT, IT SEEMS TO HELP HER

## 2020-10-20 RX ORDER — PYRIDOSTIGMINE BROMIDE 60 MG/1
60 TABLET ORAL 3 TIMES DAILY
Qty: 90 TABLET | Refills: 0 | OUTPATIENT
Start: 2020-10-20

## 2020-10-20 NOTE — TELEPHONE ENCOUNTER
Pt was last seen 3/21/19. Has canceled 3 appointments since then. Please review.       Thank you.

## 2022-03-02 ENCOUNTER — TELEPHONE (OUTPATIENT)
Dept: NEUROLOGY | Facility: CLINIC | Age: 87
End: 2022-03-02

## 2022-03-02 NOTE — TELEPHONE ENCOUNTER
Lm for patient's daughter letting her know that Dr Silvestre no longer see's patient's at [facility] and actually per Dr Silvestre office note in 2019 she was to fu with Dr Payne at [facility] for Myasthenia Gravis which Dr Silvestre does no treat    So patient needs to be rescheduled with Dr Payne at [facility]

## 2022-03-02 NOTE — TELEPHONE ENCOUNTER
Caller: LIZETH SMITH    Relationship to patient: Child    Best call back number: [phone]    Chief complaint: NA    Type of visit: F/U    Requested date: SEPTEMBER     If rescheduling, when is the original appointment: 9-20-22    Additional notes:PATIENTS DAUGHTER ASKING IF APPOINTMENT IN September CAN BE MOVED TO [facility]. ITS NOT LETTING ME PULL UP [facility] OFFICE FOR . PLEASE ADVISE.

## 2022-05-02 ENCOUNTER — TELEPHONE (OUTPATIENT)
Dept: NEUROLOGY | Facility: OTHER | Age: 87
End: 2022-05-02

## 2022-05-16 ENCOUNTER — TELEPHONE (OUTPATIENT)
Dept: NEUROLOGY | Facility: CLINIC | Age: 87
End: 2022-05-16

## 2022-05-16 NOTE — TELEPHONE ENCOUNTER
Provider: FAMILIA  Caller: JOS SMITH  Relationship to Patient: DAUGHTER/POA  Pharmacy: [id number]  Phone Number: [phone]  Reason for Call: PATIENT DAUGHTER/POA TELEPHONED; IS CONCERNED ABOUT HER MOTHER & THE MEDICATION DOSAGE SHE IS CURRENTLY TAKING.    PATIENT IS ON PYRIDOSTIGMINE (MESTINON) 60 MG TABLET 3X DAY; PATIENT SEEMED TO HAVE BEEN O.K. WITH ONLY 1 TABLET/DAY.    NOW THIS DOSAGE IS ONLY LASTING ABOUT 2 HOURS.    DAUGHTER IS ASKING IF THIS MEDICATION CAN BE RE-PRESCRIBED TO  MG EXTENDED RELEASE 1X DAY.    PATIENT APPOINTMENT HAS BEEN RE-SCHEDULED FOR 8/16/22.    PLEASE CALL & ADVISE.

## 2022-05-19 NOTE — TELEPHONE ENCOUNTER
She definitely needs an appointment before med changes can be made.  Unfortunately I do not manage MG.  She should be scheduled with one of the other providers that does.

## 2022-05-19 NOTE — TELEPHONE ENCOUNTER
Lm for patient's daughter, we have not seen her since 2019 and have not refilled her meds since then, who has been managing her meds since then?  She may need to contact them as we cannot prescribe , refill or change any meds without her being seen.    She needs to see Dr Payne for follow up in Aug as scheduled

## 2022-08-16 ENCOUNTER — OFFICE VISIT (OUTPATIENT)
Dept: NEUROLOGY | Facility: CLINIC | Age: 87
End: 2022-08-16

## 2022-08-16 VITALS
HEIGHT: 63 IN | SYSTOLIC BLOOD PRESSURE: 140 MMHG | HEART RATE: 71 BPM | DIASTOLIC BLOOD PRESSURE: 74 MMHG | OXYGEN SATURATION: 95 % | BODY MASS INDEX: 19.43 KG/M2

## 2022-08-16 DIAGNOSIS — G70.00 MYASTHENIA GRAVIS WITHOUT EXACERBATION: Primary | ICD-10-CM

## 2022-08-16 DIAGNOSIS — R53.1 WEAKNESS: ICD-10-CM

## 2022-08-16 PROCEDURE — 99215 OFFICE O/P EST HI 40 MIN: CPT | Performed by: PSYCHIATRY & NEUROLOGY

## 2022-08-16 RX ORDER — PYRIDOSTIGMINE BROMIDE 60 MG/1
60 TABLET ORAL 2 TIMES DAILY
Qty: 180 TABLET | Refills: 3 | Status: SHIPPED | OUTPATIENT
Start: 2022-08-16 | End: 2022-11-14 | Stop reason: SDUPTHER

## 2022-08-16 NOTE — PROGRESS NOTES
CC: Myasthenia gravis    HPI:  Wendi Fofana is a  89 y.o.  right-handed white female who I am seeing for the first time regarding a diagnosis of myasthenia gravis.  She was hospitalized in 2019 and seen by Dr. Aldridge 2/13/2019 and followed up by Dr. Silvestre as an outpatient 3/21/2019.  Her diagnosis was based on diffuse weakness some eyelid droop and double vision.  She is antibody positive with the following antibodies positive:     Latest Reference Range & Units 02/14/19 17:19   AChR Binding Ab 0.00 - 0.24 nmol/L 4.39 (H)   AChR Blocking Abs 0 - 25 % 53 (H)   Acetylcholine Modulating Ab 0 - 20 % 33 (H)   Striated Muscle Antibody Neg:<1:40  1:80 (H)   (H): Data is abnormally high    She never had a CT scan of the chest.  The patient's daughter brought her to this appointment.  The patient lives in assisted living.  She is not reliable to take her medication on her own and there appears to be some problems at the facility getting her her medication on time (perhaps at all).  She was seen by Dr. Lang 10/3/2013 with a fall with diagnosis myasthenia gravis and dementia.  She was not a reliable historian.  She has been treated with pyridostigmine 60 mg in the morning.  The patient's daughter states that her facility is not reliable regarding getting her an afternoon dose.    The patient and daughter agree that later in the day she has more troubles with slurring of her speech as well as swallowing.  The patient is underweight.  Some of it is related to poor appetite.  According to the daughter her food is prepared.  Read such that she could swallow it.  She does not have an upper plate and so cannot chew properly but is able to eat puréed food adequately.  She denies significant trouble swallowing liquids.  A couple of years ago a concerted effort was made to get her ambulating with physical therapy and it was not successful and so she has been confined to a wheelchair ever since.        Past Medical History:    Diagnosis Date   • Arthritis    • Dementia (HCC)    • Memory loss    • Myasthenia gravis (HCC)    • Vision loss          Past Surgical History:   Procedure Laterality Date   • CYST REMOVAL     • TONSILLECTOMY     • WISDOM TOOTH EXTRACTION             Current Outpatient Medications:   •  pyridostigmine (MESTINON) 60 MG tablet, Take 1 tablet by mouth 2 (Two) Times a Day., Disp: 180 tablet, Rfl: 3  •  aspirin 81 MG EC tablet, Take 81 mg by mouth Daily., Disp: , Rfl:   •  bisacodyl (DULCOLAX) 10 MG suppository, Insert 1 suppository into the rectum Daily As Needed for Constipation., Disp: 28 each, Rfl: 0  •  cholecalciferol 1000 units tablet, Take 1,000 Units by mouth Daily., Disp: 30 tablet, Rfl: 0  •  docusate sodium (COLACE) 100 MG capsule, Take 1 capsule by mouth 2 (Two) Times a Day As Needed for Constipation., Disp: 60 capsule, Rfl: 0  •  ferrous sulfate (FERROUSUL) 325 (65 FE) MG tablet, Take 1 tablet by mouth Daily With Breakfast., Disp: 30 tablet, Rfl: 0  •  hydrocortisone (ANUSOL-HC) 2.5 % rectal cream, Insert 1 application into the rectum 2 (Two) Times a Day., Disp: 1 each, Rfl: 0  •  vitamin B-12 (CYANOCOBALAMIN) 1000 MCG tablet, Take 1 tablet by mouth Daily., Disp: 30 tablet, Rfl: 0      Family History   Problem Relation Age of Onset   • Arthritis Mother    • Arthritis Father    • Arthritis Sister          Social History     Socioeconomic History   • Marital status:    • Number of children: 4   • Years of education: 12   Tobacco Use   • Smoking status: Never Smoker   • Smokeless tobacco: Never Used   Substance and Sexual Activity   • Alcohol use: Yes     Alcohol/week: 1.0 standard drink     Types: 1 Glasses of wine per week   • Drug use: No         Allergies   Allergen Reactions   • Penicillins Unknown (See Comments)     Pt unsure         Pain Scale: 0/10        ROS:  Review of Systems   Constitutional: Positive for fatigue. Negative for activity change and appetite change.   HENT: Negative for  "hearing loss and mouth sores.    Respiratory: Negative for cough and choking.    Gastrointestinal: Negative for abdominal pain, anal bleeding and blood in stool.   Genitourinary: Negative for frequency and genital sores.   Musculoskeletal: Negative for back pain, gait problem and joint swelling.   Neurological: Positive for speech difficulty. Negative for dizziness, tremors, seizures, syncope, facial asymmetry, weakness, light-headedness, numbness and headaches.   Hematological: Negative for adenopathy. Does not bruise/bleed easily.   Psychiatric/Behavioral: Negative for agitation, behavioral problems, confusion, decreased concentration, dysphoric mood, hallucinations, self-injury, sleep disturbance and suicidal ideas. The patient is not nervous/anxious and is not hyperactive.          I have reviewed and agree with the above ROS completed by the medical assistant.      Physical Exam:  Vitals:    08/16/22 1122   BP: 140/74   Pulse: 71   SpO2: 95%   Height: 160 cm (63\")     Orthostatic BP:    Body mass index is 19.43 kg/m².    Physical Exam  General: Underweight white female no acute distress  HEENT: Normocephalic no evidence of trauma  Neck: Supple  Heart: Regular rate and rhythm  Extremities: No edema      Neurological Exam:   Mental Status: Awake, alert, oriented to person.  Conversant without evidence of an affective disorder, thought disorder, delusions or hallucinations.  Attention span and concentration are defective.  HCF: No aphasia or dysarthria.  Mild dyspraxia present recent and remote memory defective.  Knowledge of recent events impaired  CN: I:   II: Visual fields full without left inattention   III, IV, VI: Eye movements intact without nystagmus or ptosis.  Pupils equal round and reactive to light.   V,VII: Light touch and pinprick intact all 3 divisions of V.  Facial muscles symmetrical.   VIII: Hearing intact to finger rub   IX,X: Soft palate elevates symmetrically   XI: Sternomastoid and trapezius " are strong.   XII: Tongue midline without atrophy or fasciculations  Motor: Normal tone with reduced bulk in the upper and lower extremities   Power testing: There is weakness of finger and thumb extension that is moderate.  Triceps are mildly weak.  There is additional very mild weakness elsewhere in the upper extremities.  There is iliopsoas weakness that is moderate quadriceps and hamstrings are mildly weak.  Ankle and toe dorsiflexion is quite strong and toe plantarflexion is quite strong.  Reflexes: Upper extremities: Symmetric +1        Lower extremities: Symmetric +1        Toe signs: Downgoing  Sensory: Light touch: Diffusely intact        Pinprick: Diffusely intact        Vibration: Absent at the ankles intact at the knees        Position: Intact    Cerebellar: Finger-to-nose: Intact with mild postural tremor           Rapid movement: Intact           Heel-to-shin: Intact  Gait and Station: Not ambulated    Results:      Lab Results   Component Value Date    GLUCOSE 92 10/07/2019    BUN 6 (L) 10/07/2019    CREATININE 0.51 (L) 10/07/2019    EGFRIFNONA 114 10/07/2019    BCR 11.8 10/07/2019    CO2 26.7 10/07/2019    CALCIUM 8.3 (L) 10/07/2019    ALBUMIN 3.40 (L) 10/04/2019    AST 17 10/04/2019    ALT 7 10/04/2019       Lab Results   Component Value Date    WBC 3.73 10/07/2019    HGB 11.6 (L) 10/07/2019    HCT 35.4 10/07/2019    MCV 92.4 10/07/2019     10/07/2019         .No results found for: RPR      Lab Results   Component Value Date    TSH 0.548 10/03/2019         Lab Results   Component Value Date    [id number] 374 10/04/2019         Lab Results   Component Value Date    FOLATE 7.86 10/04/2019         Lab Results   Component Value Date    HGBA1C 5.42 02/14/2019         Lab Results   Component Value Date    GLUCOSE 92 10/07/2019    BUN 6 (L) 10/07/2019    CREATININE 0.51 (L) 10/07/2019    EGFRIFNONA 114 10/07/2019    BCR 11.8 10/07/2019    K 3.5 10/07/2019    CO2 26.7 10/07/2019    CALCIUM 8.3 (L)  10/07/2019    ALBUMIN 3.40 (L) 10/04/2019    AST 17 10/04/2019    ALT 7 10/04/2019         Lab Results   Component Value Date    WBC 3.73 10/07/2019    HGB 11.6 (L) 10/07/2019    HCT 35.4 10/07/2019    MCV 92.4 10/07/2019     10/07/2019             Assessment:/Plan  1.  Myasthenia gravis-the patient has remarkably been avoiding recurrent hospitalizations and the patient's daughter indicates that for gross deterioration in status she is a DNR and they do not plan on sending her to the ER.  However getting her on a more regular dosage schedule for pyridostigmine would likely help her quality of life.  I recommended she try Mestinon 60 mg morning and 30 mg at noon with food and then she may escalated to 60 mg twice daily.  If she tolerates this but needs some later in the day dosage than another 30 mg dose could be started at suppertime and eventually 60 mg 3 times daily.  Unfortunately the daughter has identified that controlled release Mestinon is extremely expensive and so the simple option of using controlled release Mestinon is not a possibility.  2.  Bilateral finger and thumb weakness on extension-I suspect this is my static but I cannot entirely exclude radial or C7 root issues.  Given the unlikely possibility of additional treatment it is not helpful to investigate this further.  3.  I explained at length myasthenic and crisis as well as the risk of myasthenia being associated with a thymoma.  Since the patient is a DNR and no heroic measures are anticipated there is no reason to further investigate these things.  4.  Follow-up in a year  5.  Alzheimer's disease-reviewed cholinesterase inhibitors for memory loss however these are likely to cause GI side effects in a patient who cannot afford to lose any weight at all.  The daughter was not further interested in investigating this.      Time: 60 minutes                            Dictated utilizing Dragon dictation.

## 2022-08-31 ENCOUNTER — TELEPHONE (OUTPATIENT)
Dept: NEUROLOGY | Facility: CLINIC | Age: 87
End: 2022-08-31

## 2022-08-31 NOTE — TELEPHONE ENCOUNTER
Caller: JOS SMITH    Relationship: DAUGHTER    Best call back number:[phone]    What medications are you currently taking:   Current Outpatient Medications on File Prior to Visit   Medication Sig Dispense Refill   • aspirin 81 MG EC tablet Take 81 mg by mouth Daily.     • bisacodyl (DULCOLAX) 10 MG suppository Insert 1 suppository into the rectum Daily As Needed for Constipation. 28 each 0   • cholecalciferol 1000 units tablet Take 1,000 Units by mouth Daily. 30 tablet 0   • docusate sodium (COLACE) 100 MG capsule Take 1 capsule by mouth 2 (Two) Times a Day As Needed for Constipation. 60 capsule 0   • ferrous sulfate (FERROUSUL) 325 (65 FE) MG tablet Take 1 tablet by mouth Daily With Breakfast. 30 tablet 0   • hydrocortisone (ANUSOL-HC) 2.5 % rectal cream Insert 1 application into the rectum 2 (Two) Times a Day. 1 each 0   • pyridostigmine (MESTINON) 60 MG tablet Take 1 tablet by mouth 2 (Two) Times a Day. 180 tablet 3   • vitamin B-12 (CYANOCOBALAMIN) 1000 MCG tablet Take 1 tablet by mouth Daily. 30 tablet 0     No current facility-administered medications on file prior to visit.          When did you start taking these medications: N/A    Which medication are you concerned about: PYRIDOSTIGMINE    Who prescribed you this medication: DR. BARBOSA    What are your concerns: PATIENT'S DAUGHTER, NOT ON UPDATED BH VERBAL, BUT IS POA, STATES THAT THE PATIENT NEEDS TO BE TAKING THE MEDICATION 3 TIMES DAILY INSTEAD OF 2 TIMES DAILY. EVEN TAKING 3 TIMES SHE DOES NOT THINK THAT WILL HELP THE PATIENT.     ALSO WANTS TO DISCUSS WHAT TO DO WHEN THE DISEASE PROGRESSES    How long have you had these concerns: N/A

## 2022-09-02 NOTE — TELEPHONE ENCOUNTER
I spoke with the patient's daughter.  Currently on Mestinon 60 mg twice daily she is still having relatively quick wearing off late in the afternoon.  She will try 60 mg 3 times daily.    She also asks about the chances of respiratory failure.  Clearly is a risk.  Clearly she reiterates that going to the hospital emergency room is not desired she is a DNR.  End-of-life treatment by hospice was discussed regarding comfort measures perhaps using morphine or a benzodiazepine.  I asked her to get a hold of hospice again since she had been on hospice previously to let me know what their protocol is at end-of-life.  She also indicates that her primary care physician is no longer available and she does not have any primary care.    We also discussed placing her on an immunosuppressant such as Imuran or steroids.    Parvin, there is not really anything that I need you to do at this point with this case I did thought I would let you know about the conversation.    ANA

## 2022-11-14 RX ORDER — PYRIDOSTIGMINE BROMIDE 60 MG/1
60 TABLET ORAL 3 TIMES DAILY
Qty: 270 TABLET | Refills: 3 | Status: SHIPPED | OUTPATIENT
Start: 2022-11-14

## 2022-11-14 NOTE — TELEPHONE ENCOUNTER
MEDICATION REFILL REQUEST    Caller: JOS SMITH    Relationship: Emergency Contact; DAUGHTER; HARDIK    Best call back number: [phone]    Requested Prescriptions:   Requested Prescriptions     Pending Prescriptions Disp Refills   • pyridostigmine (MESTINON) 60 MG tablet 180 tablet 3     Sig: Take 1 tablet by mouth 2 (Two) Times a Day.      Pharmacy where request should be sent: [facility] [id number] [address] [facility]  [phone] [facility] [phone] FX     Additional details provided by patient: PT IS TAKING MEDICATION DIFFERENTLY THAN ORIGINALLY WRITTEN. PER DR. BARBOSA IN ENCOUNTER DATED 8/31/22, PT TO BE TAKING 1 TABLET BY MOUTH THREE TIMES DAILY. PT'S DAUGHTER CONFIRMS PT HAS BEEN TAKING THE MEDICATION ACCORDING TO THIS INSTRUCTION.    PT'S DAUGHTER CONFIRMS PT HAS MORE THAN 3-DAY SUPPLY OF THE MEDICATION.    Does the patient have less than a 3 day supply:  [] Yes  [x] No    Last office visit with prescribing clinician: 8/16/2022      Next office visit with prescribing clinician: 8/17/2023     PLEASE REVIEW AND ADVISE.    Barber Camargo Rep   11/14/22 13:45 EST

## 2023-05-02 NOTE — PATIENT INSTRUCTIONS
Below are four things you can do to prevent falls:   1. Begin an exercise program to improve your leg strength & balance  2. Ask your doctor or pharmacist to review your medicines   3. Get annual eye check-ups & update your eyeglasses  4. Make your home safer by:  · Removing clutter & tripping hazards  · Putting railings on all stairs & adding grab bars in the bathroom  · Having good lighting, especially on stairs    Contact your local community or senior center for information on exercise, fall prevention programs, or options for improving home safety.   Ur endorsed   + 3 day stay

## 2023-08-17 ENCOUNTER — OFFICE VISIT (OUTPATIENT)
Dept: NEUROLOGY | Facility: CLINIC | Age: 88
End: 2023-08-17
Payer: MEDICARE

## 2023-08-17 VITALS
OXYGEN SATURATION: 95 % | BODY MASS INDEX: 12.4 KG/M2 | HEIGHT: 63 IN | SYSTOLIC BLOOD PRESSURE: 120 MMHG | WEIGHT: 70 LBS | HEART RATE: 94 BPM | DIASTOLIC BLOOD PRESSURE: 80 MMHG

## 2023-08-17 DIAGNOSIS — G70.00 MYASTHENIA GRAVIS WITHOUT EXACERBATION: Primary | ICD-10-CM

## 2023-08-17 DIAGNOSIS — F02.80 ALZHEIMER'S DISEASE: ICD-10-CM

## 2023-08-17 DIAGNOSIS — G30.9 ALZHEIMER'S DISEASE: ICD-10-CM

## 2023-08-17 DIAGNOSIS — G60.9 IDIOPATHIC PERIPHERAL NEUROPATHY: ICD-10-CM

## 2023-08-17 NOTE — PROGRESS NOTES
CC: Myasthenia gravis and Alzheimer's disease, idiopathic peripheral neuropathy    HPI:  Wendi Fofana is a  89 y.o.  right-handed white female who I am seeing in follow-up with myasthenia gravis, Alzheimer's disease and peripheral neuropathy.  She is here with her daughter as before.  She has been doing fairly [facility] at the [facility].  Her daughter indicates that her morning dose of Mestinon 60 mg is routinely given at the 4 PM dose is not routinely given.  She does have wearing off of her Mestinon as she will become more dysarthric.  She does have some problems swallowing solid food and so a lot of what she eats is soft or soups but she still does not get in enough calories apparently.  She is very thin.  She drinks Ensure also.  She has stopped taking most of her medication except for the Mestinon.  The following history is taken from previous note with additions/modifications as indicated:         She is antibody positive with the following antibodies positive:       Latest Reference Range & Units 02/14/19 17:19   AChR Binding Ab 0.00 - 0.24 nmol/L 4.39 (H)   AChR Blocking Abs 0 - 25 % 53 (H)   Acetylcholine Modulating Ab 0 - 20 % 33 (H)   Striated Muscle Antibody Neg:<1:40  1:80 (H)   (H): Data is abnormally high     She never had a CT scan of the chest.  The patient's daughter brought her to this appointment.  The patient lives in assisted living.  She is not reliable to take her medication on her own and there appears to be some problems at the facility getting her her medication on time (perhaps at all).  She was seen by Dr. Lang 10/3/2013 with a fall with diagnosis myasthenia gravis and dementia.  She was not a reliable historian.  She has been treated with pyridostigmine 60 mg in the morning.  The patient's daughter states that her facility is not reliable regarding getting her an afternoon dose.     The patient and daughter agree that later in the day she has more troubles with slurring of her speech as well  as swallowing.  The patient is underweight.  Some of it is related to poor appetite.  According to the daughter her food is prepared.  Read such that she could swallow it.  She does not have an upper plate and so cannot chew properly but is able to eat pur‚ed food adequately.  She denies significant trouble swallowing liquids.  A couple of years ago a concerted effort was made to get her ambulating with physical therapy and it was not successful and so she has been confined to a wheelchair ever since.    Past Medical History:   Diagnosis Date    Arthritis     Dementia     Memory loss     Myasthenia gravis     Vision loss          Past Surgical History:   Procedure Laterality Date    CYST REMOVAL      TONSILLECTOMY      WISDOM TOOTH EXTRACTION             Current Outpatient Medications:     pyridostigmine (MESTINON) 60 MG tablet, Take 1 tablet by mouth 3 (Three) Times a Day., Disp: 270 tablet, Rfl: 3      Family History   Problem Relation Age of Onset    Arthritis Mother     Arthritis Father     Arthritis Sister          Social History     Socioeconomic History    Marital status:     Number of children: 4    Years of education: 12   Tobacco Use    Smoking status: Never    Smokeless tobacco: Never   Substance and Sexual Activity    Alcohol use: Yes     Alcohol/week: 1.0 standard drink     Types: 1 Glasses of wine per week    Drug use: No         Allergies   Allergen Reactions    Penicillins Unknown (See Comments)     Pt unsure         Pain Scale: 0/10        ROS:  Review of Systems   Neurological:  Negative for dizziness, tremors, seizures, syncope, facial asymmetry, speech difficulty, weakness, light-headedness, numbness and headaches.   Psychiatric/Behavioral:  Negative for agitation, behavioral problems, confusion, decreased concentration, dysphoric mood, hallucinations, self-injury, sleep disturbance and suicidal ideas. The patient is not nervous/anxious and is not hyperactive.        I have reviewed and  "agree with the above ROS completed by the medical assistant.      Physical Exam:  Vitals:    08/17/23 1056   BP: 120/80   Pulse: 94   SpO2: 95%   Weight: 31.8 kg (70 lb)   Height: 160 cm (63\")     Orthostatic BP:    Body mass index is 12.4 kg/mý.    Physical Exam  General: Very thin white female no acute distress  HEENT: Normocephalic no evidence of trauma  Neck: Supple  Heart: Regular rate and rhythm  Extremities: No pedal edema      Neurological Exam:   Mental Status: Awake, alert, oriented to person, place..  Conversant without evidence of an affective disorder, thought disorder, delusions or hallucinations.  Attention span and concentration are normal.  HCF: No aphasia, apraxia or dysarthria.  Memory is abnormal.  Knowledge   of recent events abnormal  CN: I:   II: Visual fields full without left inattention   III, IV, VI: Eye movements intact without nystagmus or ptosis.  Pupils equal round and reactive to light.   V,VII: Light touch and pinprick intact all 3 divisions of V.  Facial muscles symmetrical.   VIII: Hearing intact to finger rub   IX,X: Soft palate elevates symmetrically   XI: Sternomastoid and trapezius are strong.   XII: Tongue midline without atrophy or fasciculations  Motor: Normal tone in the upper and lower extremities.  Bulk is diffusely diminished   Power testing: Finger and thumb extension is mildly weak bilaterally similar to before  Reflexes: Upper extremities:        Lower extremities:        Toe signs:  Sensory: Light touch:        Pinprick:        Vibration:        Position:    Cerebellar: Finger-to-nose:           Rapid movement:           Heel-to-shin:  Gait and Station:    Results:      Lab Results   Component Value Date    GLUCOSE 92 10/07/2019    BUN 6 (L) 10/07/2019    CREATININE 0.51 (L) 10/07/2019    EGFRIFNONA 114 10/07/2019    BCR 11.8 10/07/2019    CO2 26.7 10/07/2019    CALCIUM 8.3 (L) 10/07/2019    ALBUMIN 3.40 (L) 10/04/2019    AST 17 10/04/2019    ALT 7 10/04/2019 "       Lab Results   Component Value Date    WBC 3.73 10/07/2019    HGB 11.6 (L) 10/07/2019    HCT 35.4 10/07/2019    MCV 92.4 10/07/2019     10/07/2019         .No results found for: RPR      Lab Results   Component Value Date    TSH 0.548 10/03/2019         Lab Results   Component Value Date    [id number] 374 10/04/2019         Lab Results   Component Value Date    FOLATE 7.86 10/04/2019         Lab Results   Component Value Date    HGBA1C 5.42 02/14/2019         Lab Results   Component Value Date    GLUCOSE 92 10/07/2019    BUN 6 (L) 10/07/2019    CREATININE 0.51 (L) 10/07/2019    EGFRIFNONA 114 10/07/2019    BCR 11.8 10/07/2019    K 3.5 10/07/2019    CO2 26.7 10/07/2019    CALCIUM 8.3 (L) 10/07/2019    ALBUMIN 3.40 (L) 10/04/2019    AST 17 10/04/2019    ALT 7 10/04/2019         Lab Results   Component Value Date    WBC 3.73 10/07/2019    HGB 11.6 (L) 10/07/2019    HCT 35.4 10/07/2019    MCV 92.4 10/07/2019     10/07/2019             Assessment:   1.  Myasthenia gravis-relatively stable.  The goal is 3 doses of Mestinon daily but she is only getting 1 for sure and sometimes to.  Her daughter can tell when her Mestinon is wearing off because her speech gets more slurred.  Her daughter indicates that she calls her twice daily to get updated.  2.  Peripheral neuropathy  3.  Alzheimer's disease          Plan:  1.  To continue Mestinon 60 mg up to 3 doses daily.  2.  Encouraged her to try to take in more calories  3.  Follow-up with me in about 6 months        Time: 30 minutes                  Dictated utilizing Dragon dictation.

## 2023-12-04 RX ORDER — PYRIDOSTIGMINE BROMIDE 60 MG/1
60 TABLET ORAL 3 TIMES DAILY
Qty: 270 TABLET | Refills: 3 | Status: SHIPPED | OUTPATIENT
Start: 2023-12-04

## 2023-12-04 NOTE — TELEPHONE ENCOUNTER
Chart reviewed. Plan: ENDO today. Possible DC later today to home IPTA. Referral made to [facility] for PCP follow up and to [facility] for DC scripts to be filled prior to DC. + Pending [facility]. Received refill request for pyridostigmine via email

## 2023-12-28 ENCOUNTER — APPOINTMENT (OUTPATIENT)
Dept: GENERAL RADIOLOGY | Facility: HOSPITAL | Age: 88
End: 2023-12-28
Payer: MEDICARE

## 2023-12-28 ENCOUNTER — HOSPITAL ENCOUNTER (EMERGENCY)
Facility: HOSPITAL | Age: 88
Discharge: HOME | End: 2023-12-28
Attending: EMERGENCY MEDICINE
Payer: MEDICARE

## 2023-12-28 VITALS
SYSTOLIC BLOOD PRESSURE: 153 MMHG | DIASTOLIC BLOOD PRESSURE: 94 MMHG | TEMPERATURE: 97.4 F | OXYGEN SATURATION: 92 % | RESPIRATION RATE: 16 BRPM | HEART RATE: 71 BPM

## 2023-12-28 DIAGNOSIS — U07.1 COVID-19 VIRUS INFECTION: Primary | ICD-10-CM

## 2023-12-28 LAB
ALBUMIN SERPL-MCNC: 4.1 G/DL (ref 3.5–5.2)
ALBUMIN/GLOB SERPL: 1.2 G/DL
ALP SERPL-CCNC: 67 U/L (ref 39–117)
ALT SERPL W P-5'-P-CCNC: 14 U/L (ref 1–33)
AMORPH URATE CRY URNS QL MICRO: ABNORMAL /HPF
ANION GAP SERPL CALCULATED.3IONS-SCNC: 11 MMOL/L (ref 5–15)
AST SERPL-CCNC: 22 U/L (ref 1–32)
BACTERIA UR QL AUTO: ABNORMAL /HPF
BASOPHILS # BLD AUTO: 0.02 10*3/MM3 (ref 0–0.2)
BASOPHILS NFR BLD AUTO: 0.4 % (ref 0–1.5)
BILIRUB SERPL-MCNC: 0.6 MG/DL (ref 0–1.2)
BILIRUB UR QL STRIP: NEGATIVE
BUN SERPL-MCNC: 23 MG/DL (ref 8–23)
BUN/CREAT SERPL: 30.7 (ref 7–25)
CALCIUM SPEC-SCNC: 9.1 MG/DL (ref 8.2–9.6)
CHLORIDE SERPL-SCNC: 100 MMOL/L (ref 98–107)
CLARITY UR: ABNORMAL
CO2 SERPL-SCNC: 26 MMOL/L (ref 22–29)
COLOR UR: ABNORMAL
CREAT SERPL-MCNC: 0.75 MG/DL (ref 0.57–1)
D-LACTATE SERPL-SCNC: 1.4 MMOL/L (ref 0.5–2)
DEPRECATED RDW RBC AUTO: 44.6 FL (ref 37–54)
EGFRCR SERPLBLD CKD-EPI 2021: 75.7 ML/MIN/1.73
EOSINOPHIL # BLD AUTO: 0 10*3/MM3 (ref 0–0.4)
EOSINOPHIL NFR BLD AUTO: 0 % (ref 0.3–6.2)
ERYTHROCYTE [DISTWIDTH] IN BLOOD BY AUTOMATED COUNT: 12.9 % (ref 12.3–15.4)
GLOBULIN UR ELPH-MCNC: 3.4 GM/DL
GLUCOSE SERPL-MCNC: 122 MG/DL (ref 65–99)
GLUCOSE UR STRIP-MCNC: NEGATIVE MG/DL
HCT VFR BLD AUTO: 41.4 % (ref 34–46.6)
HGB BLD-MCNC: 14 G/DL (ref 12–15.9)
HGB UR QL STRIP.AUTO: NEGATIVE
HOLD SPECIMEN: NORMAL
HOLD SPECIMEN: NORMAL
HYALINE CASTS UR QL AUTO: ABNORMAL /LPF
IMM GRANULOCYTES # BLD AUTO: 0.01 10*3/MM3 (ref 0–0.05)
IMM GRANULOCYTES NFR BLD AUTO: 0.2 % (ref 0–0.5)
KETONES UR QL STRIP: ABNORMAL
LEUKOCYTE ESTERASE UR QL STRIP.AUTO: NEGATIVE
LIPASE SERPL-CCNC: 36 U/L (ref 13–60)
LYMPHOCYTES # BLD AUTO: 0.78 10*3/MM3 (ref 0.7–3.1)
LYMPHOCYTES NFR BLD AUTO: 14.2 % (ref 19.6–45.3)
MCH RBC QN AUTO: 31.8 PG (ref 26.6–33)
MCHC RBC AUTO-ENTMCNC: 33.8 G/DL (ref 31.5–35.7)
MCV RBC AUTO: 94.1 FL (ref 79–97)
MONOCYTES # BLD AUTO: 0.36 10*3/MM3 (ref 0.1–0.9)
MONOCYTES NFR BLD AUTO: 6.6 % (ref 5–12)
NEUTROPHILS NFR BLD AUTO: 4.31 10*3/MM3 (ref 1.7–7)
NEUTROPHILS NFR BLD AUTO: 78.6 % (ref 42.7–76)
NITRITE UR QL STRIP: NEGATIVE
NRBC BLD AUTO-RTO: 0 /100 WBC (ref 0–0.2)
PH UR STRIP.AUTO: <=5 [PH] (ref 5–8)
PLATELET # BLD AUTO: 168 10*3/MM3 (ref 140–450)
PMV BLD AUTO: 9.6 FL (ref 6–12)
POTASSIUM SERPL-SCNC: 3.9 MMOL/L (ref 3.5–5.2)
PROT SERPL-MCNC: 7.5 G/DL (ref 6–8.5)
PROT UR QL STRIP: ABNORMAL
RBC # BLD AUTO: 4.4 10*6/MM3 (ref 3.77–5.28)
RBC # UR STRIP: ABNORMAL /HPF
REF LAB TEST METHOD: ABNORMAL
SODIUM SERPL-SCNC: 137 MMOL/L (ref 136–145)
SP GR UR STRIP: 1.03 (ref 1–1.03)
SQUAMOUS #/AREA URNS HPF: ABNORMAL /HPF
UROBILINOGEN UR QL STRIP: ABNORMAL
WBC # UR STRIP: ABNORMAL /HPF
WBC NRBC COR # BLD AUTO: 5.48 10*3/MM3 (ref 3.4–10.8)
WHOLE BLOOD HOLD COAG: NORMAL
WHOLE BLOOD HOLD SPECIMEN: NORMAL

## 2023-12-28 PROCEDURE — 83605 ASSAY OF LACTIC ACID: CPT

## 2023-12-28 PROCEDURE — 71045 X-RAY EXAM CHEST 1 VIEW: CPT

## 2023-12-28 PROCEDURE — 85025 COMPLETE CBC W/AUTO DIFF WBC: CPT

## 2023-12-28 PROCEDURE — 25810000003 SODIUM CHLORIDE 0.9 % SOLUTION: Performed by: EMERGENCY MEDICINE

## 2023-12-28 PROCEDURE — 80053 COMPREHEN METABOLIC PANEL: CPT | Performed by: EMERGENCY MEDICINE

## 2023-12-28 PROCEDURE — 83690 ASSAY OF LIPASE: CPT | Performed by: EMERGENCY MEDICINE

## 2023-12-28 PROCEDURE — 96374 THER/PROPH/DIAG INJ IV PUSH: CPT

## 2023-12-28 PROCEDURE — 63710000001 DEXAMETHASONE PER 0.25 MG: Performed by: EMERGENCY MEDICINE

## 2023-12-28 PROCEDURE — 25010000002 KETOROLAC TROMETHAMINE PER 15 MG: Performed by: EMERGENCY MEDICINE

## 2023-12-28 PROCEDURE — 99283 EMERGENCY DEPT VISIT LOW MDM: CPT

## 2023-12-28 PROCEDURE — 81001 URINALYSIS AUTO W/SCOPE: CPT | Performed by: EMERGENCY MEDICINE

## 2023-12-28 RX ORDER — DEXAMETHASONE 4 MG/1
6 TABLET ORAL DAILY
Qty: 20 TABLET | Refills: 0 | Status: DISCONTINUED | OUTPATIENT
Start: 2023-12-28 | End: 2023-12-28 | Stop reason: HOSPADM

## 2023-12-28 RX ORDER — SODIUM CHLORIDE 0.9 % (FLUSH) 0.9 %
10 SYRINGE (ML) INJECTION AS NEEDED
Status: DISCONTINUED | OUTPATIENT
Start: 2023-12-28 | End: 2023-12-28 | Stop reason: HOSPADM

## 2023-12-28 RX ORDER — KETOROLAC TROMETHAMINE 15 MG/ML
15 INJECTION, SOLUTION INTRAMUSCULAR; INTRAVENOUS ONCE
Status: COMPLETED | OUTPATIENT
Start: 2023-12-28 | End: 2023-12-28

## 2023-12-28 RX ORDER — DEXAMETHASONE SODIUM PHOSPHATE 10 MG/ML
6 INJECTION INTRAMUSCULAR; INTRAVENOUS DAILY
Qty: 10 ML | Refills: 0 | Status: DISCONTINUED | OUTPATIENT
Start: 2023-12-28 | End: 2023-12-28 | Stop reason: HOSPADM

## 2023-12-28 RX ADMIN — DEXAMETHASONE 6 MG: 4 TABLET ORAL at 15:21

## 2023-12-28 RX ADMIN — KETOROLAC TROMETHAMINE 15 MG: 15 INJECTION, SOLUTION INTRAMUSCULAR; INTRAVENOUS at 14:34

## 2023-12-28 RX ADMIN — SODIUM CHLORIDE 500 ML: 9 INJECTION, SOLUTION INTRAVENOUS at 14:33

## 2023-12-28 NOTE — ED NOTES
Pt arrives via EMS from [facility]. Staff called for a positive COVID test. EMS reports that whenever they moved the pt she complained of pain but could not say where. Pt has dementia at baseline and is only A/O to self and place.

## 2023-12-28 NOTE — CASE MANAGEMENT/SOCIAL WORK
Patient's daughter at bedside informs that patient is wheelchair bound and they cannot transport her back. I contacted [facility] and [facility], both of which have no availability. Contacted [facility] dispatch to arrange ambulance transport back to [facility]. Awaiting return call with ELBA Cooper RN

## 2023-12-28 NOTE — ED PROVIDER NOTES
EMERGENCY DEPARTMENT ENCOUNTER    Room Number:  31/31  Date of encounter:  12/28/2023  PCP: Raj Payne MD  Historian: Patient    Patient was placed in face mask during triage process. Patient was wearing facemask when I entered the room and throughout our encounter. I wore full protective equipment throughout this patient encounter including a face mask, eye protection, and gloves. Hand hygiene was performed before donning protective equipment and again following doffing of PPE after leaving the room.    HPI:  Chief Complaint: COVID-positive; sent from nursing home  A complete HPI/ROS/PMH/PSH/SH/FH are unobtainable due to: Patient with history of dementia is rather limited historian  Context: Wendi Fofana is a 89 y.o. female who presents to the ED via EMS from the nursing facility where she resides after a COVID-positive test.  Patient denies any focal pain complaints but notes she is uncomfortable.  She is unable to give me very many details.  She denies dyspnea at this time as well as nausea.      MEDICAL HISTORY REVIEW  Additional sources:  - Discussed/ obtained information from independent historians: EMS report to triage nurse    - External (non-ED) record review: 8/17/2023-neurology note.  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   • Idiopathic peripheral neuropathy 10/03/2019   • Dementia of the Alzheimer's type with late onset without behavioral disturbance 10/03/2019   • Vitamin D deficiency 10/04/2019   • B12 deficiency 10/04/2019   • Anemia of chronic disease 10/04/2019   • Partial rectal prolapse 10/04/2019     Resolved  Ambulatory Problems     Diagnosis Date Noted   • Headache 10/02/2019   • Hypokalemia 10/03/2019     Past Medical History:   Diagnosis Date   • Arthritis    • Dementia    • Memory loss    • Vision loss          PAST SURGICAL HISTORY  Past Surgical History:   Procedure Laterality Date   • CYST REMOVAL     • TONSILLECTOMY     • WISDOM TOOTH EXTRACTION           FAMILY HISTORY  Family History   Problem Relation Age of Onset   • Arthritis Mother    • Arthritis Father    • Arthritis Sister          SOCIAL HISTORY  Social History     Socioeconomic History   • Marital status:    • Number of children: 4   • Years of education: 12   Tobacco Use   • Smoking status: Never   • Smokeless tobacco: Never   Substance and Sexual Activity   • Alcohol use: Yes     Alcohol/week: 1.0 standard drink of alcohol     Types: 1 Glasses of wine per week   • Drug use: No         ALLERGIES  Penicillins        REVIEW OF SYSTEMS  Review of Systems     All systems reviewed and negative except for those discussed in HPI.       PHYSICAL EXAM    I have reviewed the triage vital signs and nursing notes.    ED Triage Vitals   Temp Heart Rate Resp BP SpO2   12/28/23 1104 12/28/23 1103 12/28/23 1103 12/28/23 1103 12/28/23 1103   97.4 °F (36.3 °C) 80 18 146/87 95 %      Temp src Heart Rate Source Patient Position BP Location FiO2 (%)   12/28/23 1104 12/28/23 1103 12/28/23 1103 -- --   Oral Monitor Lying         Physical Exam    Physical Exam   Constitutional: No distress.  Elderly but very pleasant and in no acute distress.  Occasional dry cough.  HENT:  Head: Normocephalic and atraumatic.   Oropharynx: Mucous membranes are mild dry.  Eyes: No scleral icterus.   Neck: Neck supple.  No meningismus  Cardiovascular: Pink, warm and well-perfused throughout.  Regular  Pulmonary/Chest: No respiratory distress.  No tachypnea or increased work of breathing.  Clear to auscultation  Abdominal: Soft.  No rebound or rigidity.  Benign exam  Musculoskeletal:  Moves all extremities equally.   Neurological: Alert.  Speech intelligible.  Moving all extremities equally.  Skin: Skin is pink, warm, and dry. No pallor.   Psychiatric: Limited historian but very pleasant.  Nursing note and vitals reviewed.    LAB RESULTS  Recent Results (from the past 24 hour(s))   Urinalysis With Culture If Indicated - Straight Cath    Collection Time: 12/28/23 12:11 PM    Specimen: Straight Cath; Urine   Result Value Ref Range    Color, UA Dark Yellow (A) Yellow, Straw    Appearance, UA Cloudy (A) Clear    pH, UA <=5.0 5.0 - 8.0    Specific Gravity, UA 1.026 1.005 - 1.030    Glucose, UA Negative Negative    Ketones, UA 15 mg/dL (1+) (A) Negative    Bilirubin, UA Negative Negative    Blood, UA Negative Negative    Protein,  mg/dL (2+) (A) Negative    Leuk Esterase, UA Negative Negative    Nitrite, UA Negative Negative    Urobilinogen, UA 1.0 E.U./dL 0.2 - 1.0 E.U./dL   Urinalysis, Microscopic Only - Straight Cath    Collection Time: 12/28/23 12:11 PM    Specimen: Straight Cath; Urine   Result Value Ref Range    RBC, UA None Seen None Seen, 0-2 /HPF    WBC, UA 0-2 None Seen, 0-2 /HPF    Bacteria, UA None Seen None Seen /HPF    Squamous Epithelial Cells, UA 3-6 (A) None Seen, 0-2 /HPF    Hyaline Casts, UA 7-12 None Seen /LPF    Amorphous Crystals, UA Moderate/2+ None Seen /HPF    Methodology Manual Light Microscopy    Comprehensive Metabolic Panel    Collection Time: 12/28/23 12:15 PM    Specimen: Blood   Result Value Ref Range    Glucose 122 (H) 65 - 99 mg/dL    BUN 23 8 - 23 mg/dL    Creatinine 0.75 0.57 - 1.00 mg/dL    Sodium 137 136 - 145 mmol/L    Potassium 3.9 3.5 - 5.2 mmol/L    Chloride 100 98 - 107 mmol/L    CO2 26.0 22.0 - 29.0 mmol/L    Calcium 9.1 8.2 - 9.6 mg/dL    Total Protein 7.5 6.0 - 8.5 g/dL    Albumin 4.1 3.5 - 5.2 g/dL    ALT (SGPT) 14 1 - 33 U/L    AST (SGOT) 22 1 - 32 U/L    Alkaline Phosphatase 67 39 - 117 U/L    Total Bilirubin 0.6 0.0 - 1.2 mg/dL    Globulin 3.4  gm/dL    A/G Ratio 1.2 g/dL    BUN/Creatinine Ratio 30.7 (H) 7.0 - 25.0    Anion Gap 11.0 5.0 - 15.0 mmol/L    eGFR 75.7 >60.0 mL/min/1.73   Lipase    Collection Time: 12/28/23 12:15 PM    Specimen: Blood   Result Value Ref Range    Lipase 36 13 - 60 U/L   Lactic Acid, Plasma    Collection Time: 12/28/23 12:15 PM    Specimen: Blood   Result Value Ref Range    Lactate 1.4 0.5 - 2.0 mmol/L   Green Top (Gel)    Collection Time: 12/28/23 12:15 PM   Result Value Ref Range    Extra Tube Hold for add-ons.    Lavender Top    Collection Time: 12/28/23 12:15 PM   Result Value Ref Range    Extra Tube hold for add-on    Gold Top - SST    Collection Time: 12/28/23 12:15 PM   Result Value Ref Range    Extra Tube Hold for add-ons.    Light Blue Top    Collection Time: 12/28/23 12:15 PM   Result Value Ref Range    Extra Tube Hold for add-ons.    CBC Auto Differential    Collection Time: 12/28/23 12:15 PM    Specimen: Blood   Result Value Ref Range    WBC 5.48 3.40 - 10.80 10*3/mm3    RBC 4.40 3.77 - 5.28 10*6/mm3    Hemoglobin 14.0 12.0 - 15.9 g/dL    Hematocrit 41.4 34.0 - 46.6 %    MCV 94.1 79.0 - 97.0 fL    MCH 31.8 26.6 - 33.0 pg    MCHC 33.8 31.5 - 35.7 g/dL    RDW 12.9 12.3 - 15.4 %    RDW-SD 44.6 37.0 - 54.0 fl    MPV 9.6 6.0 - 12.0 fL    Platelets 168 140 - 450 10*3/mm3    Neutrophil % 78.6 (H) 42.7 - 76.0 %    Lymphocyte % 14.2 (L) 19.6 - 45.3 %    Monocyte % 6.6 5.0 - 12.0 %    Eosinophil % 0.0 (L) 0.3 - 6.2 %    Basophil % 0.4 0.0 - 1.5 %    Immature Grans % 0.2 0.0 - 0.5 %    Neutrophils, Absolute 4.31 1.70 - 7.00 10*3/mm3    Lymphocytes, Absolute 0.78 0.70 - 3.10 10*3/mm3    Monocytes, Absolute 0.36 0.10 - 0.90 10*3/mm3    Eosinophils, Absolute 0.00 0.00 - 0.40 10*3/mm3    Basophils, Absolute 0.02 0.00 - 0.20 10*3/mm3    Immature Grans, Absolute 0.01 0.00 - 0.05 10*3/mm3    nRBC 0.0 0.0 - 0.2 /100 WBC       Ordered the above labs and independently reviewed the results.        RADIOLOGY  XR Chest 1 View    Result Date:  12/28/2023  XR CHEST 1 VW-  HISTORY: Female who is 89 years-old, cough  TECHNIQUE: Frontal view of the chest  COMPARISON: 2/13/2019  FINDINGS:. Heart size normal. Aorta is calcified. Pulmonary vasculature is unremarkable. Minimal right basilar atelectasis or infiltrate. No pleural effusion, or pneumothorax. No acute osseous process.      Minimal right basilar atelectasis or infiltrate.    This report was finalized on 12/28/2023 1:21 PM by Dr. Jamshid Atkinson M.D on Workstation: [facility]       I ordered the above noted radiological studies. Reviewed by me and discussed with radiologist.  See dictation for official radiology interpretation.      PROCEDURES    Procedures        MEDICATIONS GIVEN IN ER    Medications   sodium chloride 0.9 % flush 10 mL (has no administration in time range)   sodium chloride 0.9 % bolus 500 mL (has no administration in time range)   ketorolac (TORADOL) injection 15 mg (has no administration in time range)         PROGRESS, DATA ANALYSIS, CONSULTS, AND MEDICAL DECISION MAKING    My differential diagnosis for cough includes but is not limited to:  Upper respiratory infection, bronchitis, pneumonia, COPD exacerbation, cough variant asthma, cardiac asthma, coronary artery disease, congestive heart failure, bacterial, viral or lung infections, lung cancer, aspiration pneumonitis, aspiration of foreign body and Covid -19    All labs have been independently reviewed by me.  All radiology studies have been reviewed by me and discussed with radiologist dictating the report.   EKG's independently viewed and interpreted by me.  Discussion below represents my analysis of pertinent findings related to patient's condition, differential diagnosis, treatment plan and final disposition.      ED Course as of 12/28/23 1431   Thu Dec 28, 2023   1248 Lactate: 1.4 [RS]   1248 WBC: 5.48 [RS]   1248 Hemoglobin: 14.0 [RS]   1248 Immature Grans, Absolute: 0.01 [RS]   1248 Leukocytes, UA: Negative [RS]   1248  Nitrite, UA: Negative [RS]   1248 Ketones, UA(!): 15 mg/dL (1+) [RS]   1248 Specific Gravity, UA: 1.026 [RS]   1416 Lipase: 36 [RS]   1416 Glucose(!): 122 [RS]   1416 BUN: 23 [RS]   1416 Creatinine: 0.75 [RS]   1416 Sodium: 137 [RS]   1416 Potassium: 3.9 [RS]   1416 RADIOLOGY      Study: Single view chest  Findings: No pneumothorax or large focal infiltrate  I independently viewed and interpreted these images contemporaneously with treatment.    [RS]   1416 Patient has been volume resuscitated and laboratory evaluation is unrevealing for acute pathology.  Vital signs reassuring and stable.  Patient may be a candidate for Paxlovid but no clear indication at this time that the patient requires admission. [RS]   1431 Case reviewed with clinical pharmacist.  Patient okay to have Paxlovid. [RS]      ED Course User Index  [RS] Pb Payne MD       AS OF 14:22 EST VITALS:    BP - 146/87  HR - 80  TEMP - 97.4 °F (36.3 °C) (Oral)  O2 SATS - 95%        DIAGNOSIS  Final diagnoses:   COVID-19 virus infection         DISPOSITION  DISCHARGE    Patient discharged in stable condition.    Reviewed implications of results, diagnosis, meds, responsibility to follow up, warning signs and symptoms of possible worsening, potential complications and reasons to return to ER.    Patient/Family voiced understanding of above instructions.    Discussed plan for discharge, as there is no emergent indication for admission. Patient referred to primary care provider for regular health maintenance. Pt/family is agreeable and understands need for follow up and possible repeat testing.  Pt is aware that discharge does not mean that nothing is wrong but it indicates no emergency is present that requires admission and they must continue care with follow-up as given below or physician of their choice.     FOLLOW-UP  Raj Payne MD  [address] [id number] [phone]    Schedule an appointment as soon as possible for a visit    As needed         Medication List      No changes were made to your prescriptions during this visit.           Please note that portions of this were completed with a voice recognition program.       Note Disclaimer: At [facility], we believe that sharing information builds trust and better relationships. You are receiving this note because you are receiving care at [facility] or recently visited. It is possible you will see health information before a provider has talked with you about it. This kind of information can be easy to misunderstand. To help you fully understand what it means for your health, we urge you to discuss this note with your provider.     Pb Payne MD  12/28/23 1422       Pb Payne MD  12/28/23 1435

## 2023-12-28 NOTE — CASE MANAGEMENT/SOCIAL WORK
Received return call from Amie with [facility], who informs of ETA of 0100. LILIAN FarooqN RN

## 2024-03-22 ENCOUNTER — TELEPHONE (OUTPATIENT)
Dept: NEUROLOGY | Facility: CLINIC | Age: 89
End: 2024-03-22
Payer: MEDICARE

## 2024-03-22 NOTE — TELEPHONE ENCOUNTER
Pt daughter Anahi called to ask if pt could be prescribed a short course of prednisone. She states pt has had an increase in weakness and difficulty swallowing. I asked if pt was taking Mestinon TID as prescribed and daughter stated the nursing home does not dose pt properly and she rarely gets it more than once or twice per day.

## 2024-03-25 ENCOUNTER — TELEPHONE (OUTPATIENT)
Dept: NEUROLOGY | Facility: CLINIC | Age: 89
End: 2024-03-25

## 2024-03-25 NOTE — TELEPHONE ENCOUNTER
Returned call and advised daughter to check [facility]  Daughter stated she would shop around and call back to let me know what pharmacy to send rx to

## 2024-03-25 NOTE — TELEPHONE ENCOUNTER
Caller: JOS SMITH    Relationship: Emergency Contact; DAUGHTER    Best call back number: [phone]    What was the call regarding: PT'S DAUGHTER STATES THAT PT HAS BEEN TAKING THE REGULAR RELEASE MESTINON THREE TIMES DAILY BECAUSE THE EXTENDED RELEASE FORM HAS ALWAYS BEEN TOO EXPENSIVE. SHE STATES THAT THE COST OF THE EXTENDED RELEASE HAS SUPPOSEDLY DECREASED AND SHE WOULD LIKE FOR PT TO SWITCH IF POSSIBLE.    HER CONCERN IS THAT SHE WOULD HAVE TO SHOP AROUND PHARMACIES TO DETERMINE WHOSE PRICING WOULD BE MOST AFFORDABLE, HOWEVER, PHARMACIES WILL NOT PROVIDE HER WITH A PRICE ESTIMATE WITHOUT A PRESCRIPTION ON FILE. PT'S DAUGHTER ASKS WHAT OFFICE WOULD RECOMMEND IN THESE REGARDS.    Do you require a callback: YES, PLEASE.    PLEASE REVIEW AND ADVISE.

## 2024-03-26 ENCOUNTER — TELEPHONE (OUTPATIENT)
Dept: NEUROLOGY | Facility: CLINIC | Age: 89
End: 2024-03-26
Payer: MEDICARE

## 2024-03-26 RX ORDER — PYRIDOSTIGMINE BROMIDE 180 MG/1
180 TABLET, EXTENDED RELEASE ORAL 2 TIMES DAILY
Qty: 60 TABLET | Refills: 5 | Status: SHIPPED | OUTPATIENT
Start: 2024-03-26 | End: 2024-03-29 | Stop reason: SDUPTHER

## 2024-03-26 NOTE — PROGRESS NOTES
Patient family requesting pyridostigmine extended release because patient's facility has a hard time getting all 3 doses to patient.  I will send in a prescription for rivastigmine bromide send release 180 mg twice a day.  Must be at least 6 hours apart.  Because these are extended release they cannot be crushed or chewed which could be difficult if patient has a hard time swallowing.

## 2024-03-26 NOTE — TELEPHONE ENCOUNTER
Pt daughter is requesting rx for extended release mestinon be sent to the [facility] pharmacy in her chart. 30 day supply is being requested. She is making this request because the facility where pt lives only gives her the Mestinon once a day due to being short staffed.

## 2024-03-28 ENCOUNTER — TELEPHONE (OUTPATIENT)
Dept: NEUROLOGY | Facility: CLINIC | Age: 89
End: 2024-03-28
Payer: MEDICARE

## 2024-03-28 NOTE — TELEPHONE ENCOUNTER
kenyatta francois (Key: [id number])  PA Case ID #: [id number]  Need Help? Call us at [phone]  Status  sent iconSent to Plan today  Drug  Mestinon 180MG er tablets  ePA cloud logo  Form  [employer] Electronic PA Form

## 2024-03-29 DIAGNOSIS — G70.00 MYASTHENIA GRAVIS WITHOUT EXACERBATION: Primary | ICD-10-CM

## 2024-03-29 RX ORDER — PYRIDOSTIGMINE BROMIDE 180 MG/1
180 TABLET, EXTENDED RELEASE ORAL 2 TIMES DAILY
Qty: 60 TABLET | Refills: 5 | Status: SHIPPED | OUTPATIENT
Start: 2024-03-29

## 2024-04-29 ENCOUNTER — TELEPHONE (OUTPATIENT)
Dept: NEUROLOGY | Facility: CLINIC | Age: 89
End: 2024-04-29

## 2024-04-29 NOTE — TELEPHONE ENCOUNTER
Caller: LIZETH LUIS    Relationship: DAUGHTER    Best call back number: [phone]    What was the call regarding: LIZETH IS CALLING REGARDING HER MOTHER AND THAT DR BARBOSA WAS HER PCP DUE TO HER MYASTHENIA GRAVIS.     A DEATH CERTIFICATE NEEDS TO BE SIGNED AND LIZETH HAS BEEN TOLD TWO DIFFERENT THINGS AND NEEDS TO KNOW IF DR BARBOSA CAN SIGN THE DEATH CERTIFICATION OR NOT.     PLEASE REVIEW  THANK YOU

## 2024-04-30 NOTE — TELEPHONE ENCOUNTER
Called and spoke to jose  Advised her that Dr Payne was not pts PCP, he was her neurologist, so he will not be signing the death certificate

## 2024-05-07 ENCOUNTER — TELEPHONE (OUTPATIENT)
Dept: NEUROLOGY | Facility: CLINIC | Age: 89
End: 2024-05-07
Payer: MEDICARE

## 2024-05-13 ENCOUNTER — TELEPHONE (OUTPATIENT)
Dept: NEUROLOGY | Facility: CLINIC | Age: 89
End: 2024-05-13
Payer: MEDICARE

## 2024-05-14 NOTE — TELEPHONE ENCOUNTER
I spoke with the patient's daughter Elina Silva who indicated that her mother's assisted living facility had no medical personnel on staff.  Apparently they did have an RN but the facility was sold a few months ago and no qualified replacement was hired to fill the position.  She goes on to say that on  around 11 AM she tried calling her mother Wendi Fofana as usual at that time and she did not answer and so she called the  and Sandy went back to check on her and found her apparently .  Subsequently the  home was called.  Several calls back-and-forth have been made to the office however I was out of town for some of this time.  Subsequently was learned the patient did not have an active primary care physician or other primary care midlevel and again no medical personnel were present at the facility.    I spoke with Fermín Welsh ([phone]) who is the .  She indicates that since it was not a 's case she cannot sign the death certificate.  Apparently the online service [employer] is where death certificate are signed online through the office of vital statistics.  I am not familiar with this online service and I am not signed up apparently.  Fermín Welsh indicated she would check out tomorrow details and let me know such that I can take care of this problem.    According the patient's daughter she had had COVID in December and was seen in the ER and treated with steroids and Paxlovid and did improve a bit.  She was still having myasthenic symptoms and she was going to try her on long-acting Mestinon but she passed away most likely due to complications of myasthenia gravis complicated by a recent COVID infection.  In addition the patient had Alzheimer's disease which was not likely to be proximally related to her death.    Raj Payne MD

## 2024-05-23 ENCOUNTER — TELEPHONE (OUTPATIENT)
Dept: NEUROLOGY | Facility: CLINIC | Age: 89
End: 2024-05-23
Payer: MEDICARE

## 2024-05-23 NOTE — TELEPHONE ENCOUNTER
Jakob with [facility]  home called asking if  has contacted vital statics department to receive authorization for him to sign death certificate.      Jakob [phone]

## 2024-05-23 NOTE — TELEPHONE ENCOUNTER
Called Jakob and advised him that we have not heard from [employer]. I contacted Lorraine Welsh and she stated she would call [employer] and have them call Dr Payne today.

## 2024-05-28 NOTE — TELEPHONE ENCOUNTER
Provider: RENE BARBOSA    Caller: DEONNA EVANS    Relationship to Patient: [facility]    Phone Number: [phone]    Reason for Call: DEONNA FROM [facility] IS CALLING REGARDING DR. BARBOSA SIGNING OFF ON THE PT'S DEATH CERTIFICATE.  PLEASE CALL TO ADVISE     THANK YOU